# Patient Record
Sex: FEMALE | Race: WHITE | NOT HISPANIC OR LATINO | Employment: OTHER | URBAN - METROPOLITAN AREA
[De-identification: names, ages, dates, MRNs, and addresses within clinical notes are randomized per-mention and may not be internally consistent; named-entity substitution may affect disease eponyms.]

---

## 2017-04-25 ENCOUNTER — RECORDS - HEALTHEAST (OUTPATIENT)
Dept: LAB | Facility: CLINIC | Age: 62
End: 2017-04-25

## 2017-04-25 LAB
CREAT SERPL-MCNC: 0.63 MG/DL (ref 0.6–1.1)
GFR SERPL CREATININE-BSD FRML MDRD: >60 ML/MIN/1.73M2

## 2017-08-31 ENCOUNTER — PRE VISIT (OUTPATIENT)
Dept: NEUROLOGY | Facility: CLINIC | Age: 62
End: 2017-08-31

## 2017-08-31 NOTE — TELEPHONE ENCOUNTER
1.  Date/reason for appt: 11/7/17 1PM MS   2.  Referring provider:  Shaneka LIRA   3.  Call to patient (Yes / No - short description): no, pt was has recs at [facility] & [facility].   4.  Previous care at / records requested from:  Recs received from [facility] (Dr. Harden) - will forward to Clinic ( Images in PACS) Also scanned in Epic   Referral received from [facility] - will forward to clinic.   [facility] - Faxed cover shee to rechapito. recs

## 2017-08-31 NOTE — TELEPHONE ENCOUNTER
Received 1 disc from [facility] - will send to film room   Included: MR Head, MR T-Spine & MR C-Spine  10/3/14

## 2017-11-03 NOTE — TELEPHONE ENCOUNTER
Records Received From: [facility]     Date/Exam/Location  (specify location if different)   Office Notes: 8/2/17, 11/2/16, 12/4/15, 8/7/15, 4/21/14   Missing: Recs from [facility]   Recs at [facility]  Recs at [facility]

## 2017-11-03 NOTE — TELEPHONE ENCOUNTER
ACTION    What did you do? Faxed cover sheets to MATEUSZ, [facility] and [facility] to latasha recs

## 2018-01-18 NOTE — TELEPHONE ENCOUNTER
APPT INFO    Date /Time: 2/6/18 1:30PM   Reason for Appt: MS   Ref Provider/Clinic: Dr. Vera   Are there internal records? Yes/No?  IF YES, list clinic names: NO   Are there outside records? Yes/No? YES - see below   Patient Contact (Y/N) & Call Details: NO - records received   Action: Reviewed records     OUTSIDE RECORDS CHECKLIST     CLINIC NAME COMMENTS REC (x) IMG (x)   [facility]  Images in PACS:  MR head 10/3/14, 11/7/13  MR thoracic 10/3/14  MR cervical 10/3/14 x x   [facility]  - -   [facility] OFFICE NOTES: 5/2015- 11/2017  RADIOLOGY: CT head 2/24/16, 1/31/16, 1/1/16, 3/10/14, 3/4/14, 3/5/14  CT cervical 2/24/16  MR thoracic 3/12/14  ME cervical 3/12/14  MR angio neck 3/10/14, 3/6/14  MR angio head 3/10/14  MR brain 3/10/14, 3/5/14  MR angio head 3/6/14  ER/HOSP: 11/13/17, 9/27/17, 1/31/16  LABS: 2013, 2014  SLP notes: 10/30/17 x x     Records Received From: [facility]     Date/Exam/Location  (specify location if different)   Office Notes: 8/2/17, 11/2/16, 12/4/15, 8/7/15, 4/21/14

## 2018-01-19 NOTE — TELEPHONE ENCOUNTER
ACTION    What did you do? Per fax from [facility], patient hasn't been seen with them since 2013

## 2018-01-19 NOTE — TELEPHONE ENCOUNTER
Received Imaging From: [facility]    Image Type (x): Disc:___  Pacs:__x_      Exam Date/Name: MR thoracic 3/12/14  MR cervical 3/12/14  MRA head 3/10/14, 3/6/14 Comments: images are in PACS

## 2018-02-06 ENCOUNTER — PRE VISIT (OUTPATIENT)
Dept: NEUROLOGY | Facility: CLINIC | Age: 63
End: 2018-02-06

## 2018-03-15 ENCOUNTER — RECORDS - HEALTHEAST (OUTPATIENT)
Dept: LAB | Facility: CLINIC | Age: 63
End: 2018-03-15

## 2018-03-15 LAB
ALBUMIN UR-MCNC: NEGATIVE MG/DL
APPEARANCE UR: ABNORMAL
BACTERIA #/AREA URNS HPF: ABNORMAL HPF
BILIRUB UR QL STRIP: NEGATIVE
COLOR UR AUTO: ABNORMAL
GLUCOSE UR STRIP-MCNC: NEGATIVE MG/DL
HGB UR QL STRIP: NEGATIVE
KETONES UR STRIP-MCNC: NEGATIVE MG/DL
LEUKOCYTE ESTERASE UR QL STRIP: ABNORMAL
MUCOUS THREADS #/AREA URNS LPF: ABNORMAL LPF
NITRATE UR QL: NEGATIVE
PH UR STRIP: 6 [PH] (ref 4.5–8)
RBC #/AREA URNS AUTO: ABNORMAL HPF
SP GR UR STRIP: 1.01 (ref 1–1.03)
SQUAMOUS #/AREA URNS AUTO: ABNORMAL LPF
UROBILINOGEN UR STRIP-ACNC: ABNORMAL
WBC #/AREA URNS AUTO: ABNORMAL HPF

## 2018-03-16 ENCOUNTER — RECORDS - HEALTHEAST (OUTPATIENT)
Dept: LAB | Facility: CLINIC | Age: 63
End: 2018-03-16

## 2018-03-16 LAB
ALBUMIN SERPL-MCNC: 3.3 G/DL (ref 3.5–5)
ALP SERPL-CCNC: 104 U/L (ref 45–120)
ALT SERPL W P-5'-P-CCNC: 22 U/L (ref 0–45)
ANION GAP SERPL CALCULATED.3IONS-SCNC: 8 MMOL/L (ref 5–18)
AST SERPL W P-5'-P-CCNC: 16 U/L (ref 0–40)
BASOPHILS # BLD AUTO: 0.1 THOU/UL (ref 0–0.2)
BASOPHILS NFR BLD AUTO: 1 % (ref 0–2)
BILIRUB SERPL-MCNC: 0.2 MG/DL (ref 0–1)
BUN SERPL-MCNC: 14 MG/DL (ref 8–22)
C TRACH DNA SPEC QL PROBE+SIG AMP: NEGATIVE
CALCIUM SERPL-MCNC: 10.2 MG/DL (ref 8.5–10.5)
CHLORIDE BLD-SCNC: 105 MMOL/L (ref 98–107)
CO2 SERPL-SCNC: 23 MMOL/L (ref 22–31)
CREAT SERPL-MCNC: 0.68 MG/DL (ref 0.6–1.1)
EOSINOPHIL # BLD AUTO: 0.3 THOU/UL (ref 0–0.4)
EOSINOPHIL NFR BLD AUTO: 4 % (ref 0–6)
ERYTHROCYTE [DISTWIDTH] IN BLOOD BY AUTOMATED COUNT: 13.4 % (ref 11–14.5)
GFR SERPL CREATININE-BSD FRML MDRD: >60 ML/MIN/1.73M2
GLUCOSE BLD-MCNC: 109 MG/DL (ref 70–125)
HBV SURFACE AG SERPL QL IA: NEGATIVE
HCT VFR BLD AUTO: 36 % (ref 35–47)
HCV AB SERPL QL IA: NEGATIVE
HGB BLD-MCNC: 11.2 G/DL (ref 12–16)
HIV 1+2 AB+HIV1 P24 AG SERPL QL IA: NEGATIVE
LYMPHOCYTES # BLD AUTO: 3.3 THOU/UL (ref 0.8–4.4)
LYMPHOCYTES NFR BLD AUTO: 37 % (ref 20–40)
MCH RBC QN AUTO: 28.2 PG (ref 27–34)
MCHC RBC AUTO-ENTMCNC: 31.1 G/DL (ref 32–36)
MCV RBC AUTO: 91 FL (ref 80–100)
MONOCYTES # BLD AUTO: 0.8 THOU/UL (ref 0–0.9)
MONOCYTES NFR BLD AUTO: 9 % (ref 2–10)
N GONORRHOEA DNA SPEC QL NAA+PROBE: NEGATIVE
NEUTROPHILS # BLD AUTO: 4.3 THOU/UL (ref 2–7.7)
NEUTROPHILS NFR BLD AUTO: 50 % (ref 50–70)
PLATELET # BLD AUTO: 323 THOU/UL (ref 140–440)
PMV BLD AUTO: 9.9 FL (ref 8.5–12.5)
POTASSIUM BLD-SCNC: 4.1 MMOL/L (ref 3.5–5)
PROT SERPL-MCNC: 7.1 G/DL (ref 6–8)
RBC # BLD AUTO: 3.97 MILL/UL (ref 3.8–5.4)
SODIUM SERPL-SCNC: 136 MMOL/L (ref 136–145)
T PALLIDUM AB SER QL: NEGATIVE
TSH SERPL DL<=0.005 MIU/L-ACNC: 1.56 UIU/ML (ref 0.3–5)
VIT B12 SERPL-MCNC: 348 PG/ML (ref 213–816)
WBC: 8.8 THOU/UL (ref 4–11)

## 2018-03-17 LAB — BACTERIA SPEC CULT: ABNORMAL

## 2018-07-10 ENCOUNTER — RECORDS - HEALTHEAST (OUTPATIENT)
Dept: LAB | Facility: CLINIC | Age: 63
End: 2018-07-10

## 2018-07-11 LAB — HGB BLD-MCNC: 10.6 G/DL (ref 12–16)

## 2018-07-12 LAB — HBA1C MFR BLD: 5.4 % (ref 4.2–6.1)

## 2018-08-20 ENCOUNTER — OFFICE VISIT (OUTPATIENT)
Dept: OPHTHALMOLOGY | Facility: CLINIC | Age: 63
End: 2018-08-20
Payer: MEDICARE

## 2018-08-20 DIAGNOSIS — H40.003 GLAUCOMA SUSPECT OF BOTH EYES: ICD-10-CM

## 2018-08-20 DIAGNOSIS — H35.371 EPIRETINAL MEMBRANE, RIGHT EYE: ICD-10-CM

## 2018-08-20 DIAGNOSIS — H25.811 COMBINED FORMS OF AGE-RELATED CATARACT OF RIGHT EYE: ICD-10-CM

## 2018-08-20 DIAGNOSIS — H25.813 COMBINED FORM OF AGE-RELATED CATARACT, BOTH EYES: Primary | ICD-10-CM

## 2018-08-20 DIAGNOSIS — H52.4 PRESBYOPIA: ICD-10-CM

## 2018-08-20 DIAGNOSIS — G35 MULTIPLE SCLEROSIS (H): ICD-10-CM

## 2018-08-20 PROBLEM — H40.001 GLAUCOMA SUSPECT OF RIGHT EYE: Status: RESOLVED | Noted: 2018-08-20 | Resolved: 2018-08-20

## 2018-08-20 PROBLEM — H40.001 GLAUCOMA SUSPECT OF RIGHT EYE: Status: ACTIVE | Noted: 2018-08-20

## 2018-08-20 PROCEDURE — 92004 COMPRE OPH EXAM NEW PT 1/>: CPT | Performed by: STUDENT IN AN ORGANIZED HEALTH CARE EDUCATION/TRAINING PROGRAM

## 2018-08-20 PROCEDURE — 92015 DETERMINE REFRACTIVE STATE: CPT | Mod: GY | Performed by: STUDENT IN AN ORGANIZED HEALTH CARE EDUCATION/TRAINING PROGRAM

## 2018-08-20 RX ORDER — TRAMADOL HYDROCHLORIDE 50 MG/1
50 TABLET ORAL 3 TIMES DAILY PRN
Status: ON HOLD | COMMUNITY
End: 2021-03-11

## 2018-08-20 RX ORDER — IBUPROFEN 200 MG
200 TABLET ORAL 2 TIMES DAILY
COMMUNITY

## 2018-08-20 RX ORDER — CLOTRIMAZOLE 1 %
1 CREAM WITH APPLICATOR VAGINAL AT BEDTIME
COMMUNITY
End: 2021-03-10

## 2018-08-20 RX ORDER — POTASSIUM CHLORIDE 1.5 G/1.58G
20 POWDER, FOR SOLUTION ORAL DAILY
COMMUNITY

## 2018-08-20 RX ORDER — QUETIAPINE FUMARATE 50 MG/1
50 TABLET, FILM COATED ORAL 2 TIMES DAILY
COMMUNITY
End: 2018-08-20 | Stop reason: DRUGHIGH

## 2018-08-20 RX ORDER — LACTULOSE 10 G/15ML
20 SOLUTION ORAL 2 TIMES DAILY
COMMUNITY
End: 2021-03-10

## 2018-08-20 RX ORDER — BACLOFEN 20 MG/1
20 TABLET ORAL 3 TIMES DAILY
COMMUNITY

## 2018-08-20 ASSESSMENT — REFRACTION_WEARINGRX
OD_SPHERE: -4.50
OD_AXIS: 060
OD_CYLINDER: +1.25
OS_CYLINDER: +0.25
OS_SPHERE: -2.50
OS_AXIS: 076
OD_ADD: +2.50
SPECS_TYPE: BIFOCAL
OS_ADD: +2.50

## 2018-08-20 ASSESSMENT — REFRACTION_MANIFEST
OD_AXIS: 038
OD_ADD: +2.50
OS_ADD: +2.50
OS_CYLINDER: +1.25
OS_AXIS: 082
OD_CYLINDER: +1.50
OD_SPHERE: -6.75
OS_SPHERE: -2.50

## 2018-08-20 ASSESSMENT — VISUAL ACUITY
CORRECTION_TYPE: GLASSES
OD_CC: 20/200
METHOD: SNELLEN - LINEAR
OS_CC+: -1
OS_CC: 20/60
OD_PH_CC: 20/80
OS_PH_CC: 20/50

## 2018-08-20 ASSESSMENT — TONOMETRY
OS_IOP_MMHG: 13
IOP_METHOD: APPLANATION
OD_IOP_MMHG: 15

## 2018-08-20 ASSESSMENT — SLIT LAMP EXAM - LIDS
COMMENTS: NORMAL
COMMENTS: NORMAL

## 2018-08-20 ASSESSMENT — CUP TO DISC RATIO
OS_RATIO: 0.3
OD_RATIO: 0.65

## 2018-08-20 ASSESSMENT — EXTERNAL EXAM - RIGHT EYE: OD_EXAM: NORMAL

## 2018-08-20 ASSESSMENT — CONF VISUAL FIELD: OD_INFERIOR_TEMPORAL_RESTRICTION: 3

## 2018-08-20 ASSESSMENT — EXTERNAL EXAM - LEFT EYE: OS_EXAM: NORMAL

## 2018-08-20 NOTE — PROGRESS NOTES
" Current Eye Medications:  none     Subjective:  Cataract eval with decreased vision both eyes for last 2 months. No eye pain or discomfort in either eye.     No previous eye injuries or surgeries. Has multiple sclerosis and says she has had \"eye problems\" related to that. Last eye exam was a c[place] of years ago where she was told she had some cataracts.     Assessment:  Shelley Greenwood is a 63 year old female who presents with:     Combined form of age-related cataract, both eyes Visually significant right eye.  Significant anxiety and multiple sclerosis. Unable to be still during our exam, excessive head movement. Would recommend general anesthetic for eye surgery.   She will have her living facility call [facility] to discuss scheduling.     Epiretinal membrane, right eye      Glaucoma suspect of both eyes Needs baseline testing after surgery.     Multiple sclerosis (H)      Visually significant cataract that is interfering with daily activities of living. Plan for cataract extraction and intraocular lens implant right eye.  Risks, benefits, complications, and alternatives discussed with patient including possibility of limitations from coexistent eye disease and loss of vision. Target refraction and lens options discussed.  Patient understands and wishes to proceed with surgery.    Plan:  Recommend cataract surgery right eye (needs BAT for left eye)  Call [facility] to discuss dates for cataract surgery right eye.    Cintia Orosco MD  [phone]             "

## 2018-08-20 NOTE — MR AVS SNAPSHOT
After Visit Summary   8/20/2018    Shelley Greenwood    MRN: [id number]           Patient Information     Date Of Birth          1955        Visit Information        Provider Department      8/20/2018 1:45 PM Cintia Orosco MD [facility]        Today's Diagnoses     Combined form of age-related cataract, both eyes    -  1    Epiretinal membrane, right eye        Glaucoma suspect of both eyes        Multiple sclerosis (H)        Presbyopia          Care Instructions    Recommend cataract surgery right eye (needs BAT for left eye)  [facility] will call you with some dates for cataract surgery.    Cintia Orosco MD  [phone]            Follow-ups after your visit        Who to contact     If you have questions or need follow up information about today's clinic visit or your schedule please contact [facility] directly at [phone].  Normal or non-critical lab and imaging results will be communicated to you by MyChart, letter or phone within 4 business days after the clinic has received the results. If you do not hear from us within 7 days, please contact the clinic through MyChart or phone. If you have a critical or abnormal lab result, we will notify you by phone as soon as possible.  Submit refill requests through [employer] or call your pharmacy and they will forward the refill request to us. Please allow 3 business days for your refill to be completed.          Additional Information About Your Visit        Care EveryWhere ID     This is your Care EveryWhere ID. This could be used by other organizations to access your [employer] medical records  [id number]         Blood Pressure from Last 3 Encounters:   03/03/15 146/77   01/04/15 127/78   04/05/13 132/63    Weight from Last 3 Encounters:   03/02/15 81.6 kg (180 lb)   01/04/15 83 kg (183 lb)   04/05/13 95.3 kg (210 lb)              We Performed the Following     EYE EXAM (SIMPLE-NONBILLABLE)     REFRACTIVE  STATUS          Today's Medication Changes          These changes are accurate as of 8/20/18  3:05 PM.  If you have any questions, ask your nurse or doctor.               These medicines have changed or have updated prescriptions.        Dose/Directions    propranolol 20 MG tablet   Commonly known as:  INDERAL   This may have changed:  Another medication with the same name was removed. Continue taking this medication, and follow the directions you see here.   Changed by:  Cintia Orosco MD        Dose:  20 mg   Take 1 tablet (20 mg) by mouth 2 times daily   Quantity:  90 tablet   Refills:  0       SEROQUEL PO   This may have changed:  Another medication with the same name was removed. Continue taking this medication, and follow the directions you see here.   Changed by:  Cintia Orosco MD        Dose:  100 mg   Take 100 mg by mouth At Bedtime   Refills:  0               Information about OPIOIDS     PRESCRIPTION OPIOIDS: WHAT YOU NEED TO KNOW   We gave you an opioid (narcotic) pain medicine. It is important to manage your pain, but opioids are not always the best choice. You should first try all the other options your care team gave you. Take this medicine for as short a time (and as few doses) as possible.    Some activities can increase your pain, such as bandage changes or therapy sessions. It may help to take your pain medicine 30 to 60 minutes before these activities. Reduce your stress by getting enough sleep, working on hobbies you enjoy and practicing relaxation or meditation. Talk to your care team about ways to manage your pain beyond prescription opioids.    These medicines have risks:    DO NOT drive when on new or higher doses of pain medicine. These medicines can affect your alertness and reaction times, and you could be arrested for driving under the influence (DUI). If you need to use opioids long-term, talk to your care team about driving.    DO NOT operate heavy machinery    DO NOT do any  other dangerous activities while taking these medicines.    DO NOT drink any alcohol while taking these medicines.     If the opioid prescribed includes acetaminophen, DO NOT take with any other medicines that contain acetaminophen. Read all labels carefully. Look for the word  acetaminophen  or  Tylenol.  Ask your pharmacist if you have questions or are unsure.    You can get addicted to pain medicines, especially if you have a history of addiction (chemical, alcohol or substance dependence). Talk to your care team about ways to reduce this risk.    All opioids tend to cause constipation. Drink plenty of water and eat foods that have a lot of fiber, such as fruits, vegetables, prune juice, apple juice and high-fiber cereal. Take a laxative (Miralax, milk of magnesia, Colace, Senna) if you don t move your bowels at least every other day. Other side effects include upset stomach, sleepiness, dizziness, throwing up, tolerance (needing more of the medicine to have the same effect), physical dependence and slowed breathing.    Store your pills in a secure place, locked if possible. We will not replace any lost or stolen medicine. If you don t finish your medicine, please throw away (dispose) as directed by your pharmacist. The [place] Pollution Control Agency has more information about safe disposal: [email or web address]://[email or web address].[email or web address]-medications         Primary Care Provider Office Phone # Fax #    Genaro Pedroza [phone]       [address] [address]        Equal Access to Services     ARCADIO WAYNE : Hadii aad ku hadasho Soomaali, waaxda luqadaha, qaybta kaalmada adespencer bedoya. So [facility] [phone].    ATENCIÓN: Si habla español, tiene a carr disposición servicios gratuitos de asistencia lingüística. Llame al [phone].    We comply with applicable federal civil rights laws and [place] laws. We  do not discriminate on the basis of race, color, national origin, age, disability, sex, sexual orientation, or gender identity.            Thank you!     Thank you for choosing [facility]  for your care. Our goal is always to provide you with excellent care. Hearing back from our patients is one way we can continue to improve our services. Please take a few minutes to complete the written survey that you may receive in the mail after your visit with us. Thank you!             Your Updated Medication List - Protect others around you: Learn how to safely use, store and throw away your medicines at www.disposemymeds.org.          This list is accurate as of 8/20/18  3:05 PM.  Always use your most recent med list.                   Brand Name Dispense Instructions for use Diagnosis    acetaminophen 500 MG tablet    TYLENOL    160 tablet    Take 1-2 tablets (500-1,000 mg) by mouth every 6 hours as needed for mild pain    Muscle pain       alendronate 70 MG tablet    FOSAMAX    10 tablet    Take 1 tablet (70 mg) by mouth every 7 days    Osteoporosis, unspecified       AMANTADINE HCL PO      Take 50 mg by mouth 2 times daily        aspirin 325 MG tablet     30 tablet    Take 1 tablet (325 mg) by mouth daily    CAD (coronary artery disease)       * BACLOFEN PO      Take 20 mg by mouth        * baclofen 20 MG tablet    LIORESAL    120 tablet    Take 1 tablet (20 mg) by mouth 4 times daily    Muscle spasticity       cholecalciferol 2000 units Caps      Take 1 tablet by mouth daily        ciprofloxacin 500 MG tablet    CIPRO    12 tablet    Take 1 tablet (500 mg) by mouth every 12 hours    UTI (urinary tract infection)       CLONAZEPAM PO      Take 1 mg by mouth 3 times daily        clotrimazole 1 % cream    LOTRIMIN     Place 1 Applicatorful vaginally At Bedtime        divalproex sodium delayed-release 250 MG DR tablet    DEPAKOTE     Take 1,750 mg by mouth At Bedtime        docusate sodium 100 MG capsule     COLACE    60 capsule    Take 1 capsule (100 mg) by mouth 2 times daily    Constipation       EPINEPHrine 0.3 MG/0.3ML injection 2-pack    EPIPEN/ADRENACLICK/or ANY BX GENERIC EQUIV     Inject 0.3 mg into the muscle once as needed for anaphylaxis        * HYDROXYZINE HCL PO      Take 50 mg by mouth At Bedtime        * hydrOXYzine 25 MG tablet    ATARAX    120 tablet    Take 1 tablet (25 mg) by mouth 2 times daily as needed for itching or other (anxiety)        ibuprofen 600 MG tablet    ADVIL/MOTRIN     Take 600 mg by mouth        lactulose 10 GM/15ML solution    CHRONULAC     Take 20 g by mouth 2 times daily        lisinopril 10 MG tablet    PRINIVIL/ZESTRIL    30 tablet    Take 1 tablet (10 mg) by mouth daily    HTN (hypertension)       LOXITANE PO      Take 40 mg by mouth At Bedtime        MELATONIN PO      Take 6 mg by mouth At Bedtime        methyl salicylate-menthol Oint ointment     1 Tube    Apply 50 g topically every 6 hours as needed    Muscle pain       omeprazole 20 MG CR capsule    priLOSEC     Take 20 mg by mouth daily.        polyethylene glycol Packet    MIRALAX/GLYCOLAX    60 packet    Take 17 g by mouth 2 times daily    Constipation       potassium chloride 20 MEQ Packet    KLOR-CON     Take 20 mEq by mouth 2 times daily        propranolol 20 MG tablet    INDERAL    90 tablet    Take 1 tablet (20 mg) by mouth 2 times daily        SEROQUEL PO      Take 100 mg by mouth At Bedtime        topiramate 100 MG tablet    TOPAMAX    90 tablet    Take 1 tablet (100 mg) by mouth 3 times daily    Bipolar I disorder, most recent episode (or current) manic, unspecified       traMADol 50 MG tablet    ULTRAM     Take 50 mg by mouth every 6 hours as needed Not to exceed 100 mg in 24 hours        traZODone HCl 150 MG 24 hr tablet    OLEPTRO     Take 150 mg by mouth nightly as needed        * Notice:  This list has 4 medication(s) that are the same as other medications prescribed for you. Read the directions carefully,  and ask your doctor or other care provider to review them with you.

## 2018-08-20 NOTE — PATIENT INSTRUCTIONS
Recommend cataract surgery right eye (needs BAT for left eye)  [facility] will call you with some dates for cataract surgery.    Cintia Orosco MD  [phone]

## 2018-08-20 NOTE — LETTER
8/20/2018         RE: Shelley Greenwood  C/o Vani Greenwood  [address]  [place] [address]        Dear Colleague,    Thank you for referring your patient, Shelley Greenwood, to the [facility]. Please see a copy of my visit note below.     Current Eye Medications:  none     Subjective:  Cataract eval with decreased vision both eyes for last 2 months. No eye pain or discomfort in either eye.     Assessment:  Shelley Greenwood is a 63 year old female who presents with:     Combined form of age-related cataract, both eyes Visually significant      Epiretinal membrane, right eye      Glaucoma suspect of both eyes      Multiple sclerosis (H)      Visually significant cataract that is interfering with daily activities of living. Plan for cataract extraction and intraocular lens implant right eye.  Risks, benefits, complications, and alternatives discussed with patient including possibility of limitations from coexistent eye disease and loss of vision. Target refraction and lens options discussed.  Patient understands and wishes to proceed with surgery.    Plan:  Recommend cataract surgery right eye (needs BAT for left eye)  [facility] will call you with some dates for cataract surgery.    Cintia Orosco MD  [phone]               Again, thank you for allowing me to participate in the care of your patient.        Sincerely,        Cintia Orosco MD

## 2018-08-21 ENCOUNTER — HOSPITAL ENCOUNTER (OUTPATIENT)
Facility: CLINIC | Age: 63
End: 2018-08-21
Attending: STUDENT IN AN ORGANIZED HEALTH CARE EDUCATION/TRAINING PROGRAM | Admitting: STUDENT IN AN ORGANIZED HEALTH CARE EDUCATION/TRAINING PROGRAM
Payer: MEDICARE

## 2018-08-21 ENCOUNTER — TELEPHONE (OUTPATIENT)
Dept: OPHTHALMOLOGY | Facility: CLINIC | Age: 63
End: 2018-08-21

## 2018-08-21 NOTE — TELEPHONE ENCOUNTER
Type of surgery: Cataract Right Eye CPT 78518  Combined forms of age-related cataract of right eye [H25.811]   Location of surgery: [facility]  Date and time of surgery: 10/08/2018 @ 9:45am  Surgeon: Dr. Orosco  Pre-Op Appt Date: 09/24/2018  Post-Op Appt Date: 10/09/2018   Packet sent out: Yes  Pre-cert/Authorization completed:  No prior auth required.   Date: 08/21/2018

## 2018-10-05 ENCOUNTER — TELEPHONE (OUTPATIENT)
Dept: OPHTHALMOLOGY | Facility: CLINIC | Age: 63
End: 2018-10-05

## 2018-10-05 NOTE — TELEPHONE ENCOUNTER
Type of surgery: Cataract Extraction with Intraocular Lens Implant Right Eye CPT code 67450  Combined forms of age-related cataract of right eye [H25.811]  Location of surgery: [facility]  Date and time of surgery: 10/22/2018 @ 9:45am  Surgeon: Dr. Orocso  Pre-Op Appt Date: 10/10/2018  Post-Op Appt Date: 10/23/2018   Packet sent out: Yes  Pre-cert/Authorization completed:  No prior auth required.   Date: 10/05/2018

## 2018-10-10 ENCOUNTER — OFFICE VISIT (OUTPATIENT)
Dept: OPHTHALMOLOGY | Facility: CLINIC | Age: 63
End: 2018-10-10
Payer: MEDICARE

## 2018-10-10 DIAGNOSIS — H25.813 COMBINED FORM OF AGE-RELATED CATARACT, BOTH EYES: Primary | ICD-10-CM

## 2018-10-10 PROCEDURE — 92136 OPHTHALMIC BIOMETRY: CPT | Mod: TC | Performed by: STUDENT IN AN ORGANIZED HEALTH CARE EDUCATION/TRAINING PROGRAM

## 2018-10-10 PROCEDURE — 92012 INTRM OPH EXAM EST PATIENT: CPT | Performed by: STUDENT IN AN ORGANIZED HEALTH CARE EDUCATION/TRAINING PROGRAM

## 2018-10-10 RX ORDER — KETOROLAC TROMETHAMINE 5 MG/ML
1 SOLUTION OPHTHALMIC 4 TIMES DAILY
Qty: 1 BOTTLE | Refills: 0 | Status: SHIPPED | OUTPATIENT
Start: 2018-10-21 | End: 2018-11-30

## 2018-10-10 RX ORDER — MOXIFLOXACIN 5 MG/ML
1 SOLUTION/ DROPS OPHTHALMIC 4 TIMES DAILY
Qty: 1 BOTTLE | Refills: 0 | Status: SHIPPED | OUTPATIENT
Start: 2018-10-21 | End: 2018-11-30

## 2018-10-10 ASSESSMENT — REFRACTION_WEARINGRX
OD_ADD: +2.50
OD_SPHERE: -4.50
OS_AXIS: 076
OS_SPHERE: -2.50
OD_AXIS: 060
OS_ADD: +2.50
SPECS_TYPE: BIFOCAL
OD_CYLINDER: +1.25
OS_CYLINDER: +0.25

## 2018-10-10 ASSESSMENT — VISUAL ACUITY
OS_BAT_LOW: 20/80-1
OS_CC: 20/70
OS_BAT_HIGH: 20/125-1
OS_CC+: -2
OD_CC: 20/200
OS_BAT_MED: 20/100-1
METHOD: SNELLEN - LINEAR
CORRECTION_TYPE: GLASSES

## 2018-10-10 ASSESSMENT — EXTERNAL EXAM - LEFT EYE: OS_EXAM: NORMAL

## 2018-10-10 ASSESSMENT — SLIT LAMP EXAM - LIDS
COMMENTS: NORMAL
COMMENTS: NORMAL

## 2018-10-10 ASSESSMENT — EXTERNAL EXAM - RIGHT EYE: OD_EXAM: NORMAL

## 2018-10-10 NOTE — LETTER
10/10/2018         RE: Shelley Greenwood  [address]  [place] [address]        Dear Colleague,    Thank you for referring your patient, Shelley Greenwood, to the [facility]. Please see a copy of my visit note below.     Current Eye Medications: none     Subjective:  Here for preop right eye.  Scheduled for 10-22-18 at 9:45 am, patient is allergic to Prednisone per chart (shortness of breath).  Patient is claustrophobic and also cannot lay on her back. She has high anxiety and multiple sclerosis. No vision changes since last visit.     Objective:  See Ophthalmology Exam.       Assessment:  Shelley Greenwood is a 63 year old female who presents with:   Encounter Diagnosis   Name Primary?     Combined form of age-related cataract, both eyes Preop cataract right eye. Anticipate general anesthesia because of her high anxiety, inability to hold still, and phobia of laying flat. Lives in nursing home.       Plan:  PRE-OP CATARACT INSTRUCTIONS    **Prescriptions for 2 eyedrops given today. Please have them filled within the next 10 days.  *Use the following drops in the right eye 4 times on the day before surgery and once the morning of surgery:                                  Vigamox or Ofloxacin (tan cap)  Ketorolac (gray cap)    *Please bring all your eyedrops to the surgery center.  *If taking more than one drop, wait five minutes between drops.  *No solid food or drink after midnight. Please take the starred medications with a small sip of water the morning of surgery.    Cintia Orosco MD  [phone]      Again, thank you for allowing me to participate in the care of your patient.        Sincerely,        Cintia Orosco MD

## 2018-10-10 NOTE — PROGRESS NOTES
Current Eye Medications: none     Subjective:  Here for preop right eye.  Scheduled for 10-22-18 at 9:45 am, patient is allergic to Prednisone per chart (shortness of breath).  Patient is claustrophobic and also cannot lay on her back. She has high anxiety and multiple sclerosis. No vision changes since last visit.     Objective:  See Ophthalmology Exam.       Assessment:  Shelley Greenwood is a 63 year old female who presents with:   Encounter Diagnosis   Name Primary?     Combined form of age-related cataract, both eyes Preop cataract right eye. Anticipate general anesthesia because of her high anxiety, inability to hold still, and phobia of laying flat. Lives in nursing home.       Plan:  PRE-OP CATARACT INSTRUCTIONS    **Prescriptions for 2 eyedrops given today. Please have them filled within the next 10 days.  *Use the following drops in the right eye 4 times on the day before surgery and once the morning of surgery:                                  Vigamox or Ofloxacin (tan cap)  Ketorolac (gray cap)    *Please bring all your eyedrops to the surgery center.  *If taking more than one drop, wait five minutes between drops.  *No solid food or drink after midnight. Please take the starred medications with a small sip of water the morning of surgery.    Cintia Orosco MD  [phone]

## 2018-10-10 NOTE — PATIENT INSTRUCTIONS
PRE-OP CATARACT INSTRUCTIONS    **Prescriptions for 2 eyedrops given today. Please have them filled within the next 10 days.    *Use the following drops in the right eye 4 times on the day before surgery and once the morning of surgery:                                  Vigamox or Ofloxacin (tan cap)  Ketorolac (gray cap)    *Please bring all your eyedrops to the surgery center.    *If taking more than one drop, wait five minutes between drops.    *No solid food or drink after midnight. Please take the starred medications with a small sip of water the morning of surgery.    Cintia Orosco MD  [phone]

## 2018-10-10 NOTE — MR AVS SNAPSHOT
After Visit Summary   10/10/2018    Shelley Greenwood    MRN: [id number]           Patient Information     Date Of Birth          1955        Visit Information        Provider Department      10/10/2018 2:45 PM Cintia Orosco MD [facility] [place]        Today's Diagnoses     Combined form of age-related cataract, both eyes    -  1      Care Instructions    PRE-OP CATARACT INSTRUCTIONS    **Prescriptions for 2 eyedrops given today. Please have them filled within the next 10 days.    *Use the following drops in the right eye 4 times on the day before surgery and once the morning of surgery:                                  Vigamox or Ofloxacin (tan cap)  Ketorolac (gray cap)    *Please bring all your eyedrops to the surgery center.    *If taking more than one drop, wait five minutes between drops.    *No solid food or drink after midnight. Please take the starred medications with a small sip of water the morning of surgery.    Cintia Orosco MD  [phone]          Follow-ups after your visit        Follow-up notes from your care team     Return for PO1, as scheduled.      Your next 10 appointments already scheduled     Oct 22, 2018   Procedure with Cintia Orosco MD   [facility] (--)    [address]  [facility] [address]   [phone]            Oct 23, 2018 10:45 AM CDT   [facility] [place] [facility]    [address]  [place] [address]   [phone]            Oct 31, 2018  1:45 PM CDT   Return Visit with MD [facility] ([facility])    [address]  [place] [address]   [phone]            Nov 28, 2018 12:45 PM CST   [facility] [place] [facility]    [address]  [place] [address]   [phone]              Who to contact      If you have questions or need follow up information about today's clinic visit or your schedule please contact [facility] directly at [phone].  Normal or non-critical lab and imaging results will be communicated to you by MyChart, letter or phone within 4 business days after the clinic has received the results. If you do not hear from us within 7 days, please contact the clinic through MyChart or phone. If you have a critical or abnormal lab result, we will notify you by phone as soon as possible.  Submit refill requests through [employer] or call your pharmacy and they will forward the refill request to us. Please allow 3 business days for your refill to be completed.          Additional Information About Your Visit        Care EveryWhere ID     This is your Care EveryWhere ID. This could be used by other organizations to access your [place] medical records  [id number]         Blood Pressure from Last 3 Encounters:   03/03/15 146/77   01/04/15 127/78   04/05/13 132/63    Weight from Last 3 Encounters:   03/02/15 81.6 kg (180 lb)   01/04/15 83 kg (183 lb)   04/05/13 95.3 kg (210 lb)              Today, you had the following     No orders found for display       Primary Care Provider Office Phone # Fax #    Genaro Pedroza [phone]       [id number] [place] [address]        Equal Access to Services     [facility]: Hadii aad ku hadasho Soomaali, waaxda luqadaha, qaybta kaalmada adeegyada, spencer polo haytierney perez . So [facility] [phone].    ATENCIÓN: Si habla español, tiene a carr disposición servicios gratuitos de asistencia lingüística. Llame al [phone].    We comply with applicable federal civil rights laws and [place] laws. We do not discriminate on the basis of race, color, national origin, age, disability, sex, sexual orientation, or gender identity.            Thank you!     Thank you for choosing [facility]  [facility]  for your care. Our goal is always to provide you with excellent care. Hearing back from our patients is one way we can continue to improve our services. Please take a few minutes to complete the written survey that you may receive in the mail after your visit with us. Thank you!             Your Updated Medication List - Protect others around you: Learn how to safely use, store and throw away your medicines at www.disposemymeds.org.          This list is accurate as of 10/10/18  3:13 PM.  Always use your most recent med list.                   Brand Name Dispense Instructions for use Diagnosis    acetaminophen 500 MG tablet    TYLENOL    160 tablet    Take 1-2 tablets (500-1,000 mg) by mouth every 6 hours as needed for mild pain    Muscle pain       alendronate 70 MG tablet    FOSAMAX    10 tablet    Take 1 tablet (70 mg) by mouth every 7 days    Osteoporosis, unspecified       AMANTADINE HCL PO      Take 50 mg by mouth 2 times daily        aspirin 325 MG tablet     30 tablet    Take 1 tablet (325 mg) by mouth daily    CAD (coronary artery disease)       * BACLOFEN PO      Take 20 mg by mouth        * baclofen 20 MG tablet    LIORESAL    120 tablet    Take 1 tablet (20 mg) by mouth 4 times daily    Muscle spasticity       cholecalciferol 2000 units Caps      Take 1 tablet by mouth daily        ciprofloxacin 500 MG tablet    CIPRO    12 tablet    Take 1 tablet (500 mg) by mouth every 12 hours    UTI (urinary tract infection)       CLONAZEPAM PO      Take 1 mg by mouth 3 times daily        clotrimazole 1 % cream    LOTRIMIN     Place 1 Applicatorful vaginally At Bedtime        divalproex sodium delayed-release 250 MG DR tablet    DEPAKOTE     Take 1,750 mg by mouth At Bedtime        docusate sodium 100 MG capsule    COLACE    60 capsule    Take 1 capsule (100 mg) by mouth 2 times daily    Constipation       EPINEPHrine 0.3 MG/0.3ML injection 2-pack    EPIPEN/ADRENACLICK/or ANY BX GENERIC EQUIV     Inject 0.3  mg into the muscle once as needed for anaphylaxis        * HYDROXYZINE HCL PO      Take 50 mg by mouth At Bedtime        * hydrOXYzine 25 MG tablet    ATARAX    120 tablet    Take 1 tablet (25 mg) by mouth 2 times daily as needed for itching or other (anxiety)        ibuprofen 600 MG tablet    ADVIL/MOTRIN     Take 600 mg by mouth        lactulose 10 GM/15ML solution    CHRONULAC     Take 20 g by mouth 2 times daily        lisinopril 10 MG tablet    PRINIVIL/ZESTRIL    30 tablet    Take 1 tablet (10 mg) by mouth daily    HTN (hypertension)       LOXITANE PO      Take 40 mg by mouth At Bedtime        MELATONIN PO      Take 6 mg by mouth At Bedtime        methyl salicylate-menthol Oint ointment     1 Tube    Apply 50 g topically every 6 hours as needed    Muscle pain       omeprazole 20 MG CR capsule    priLOSEC     Take 20 mg by mouth daily.        polyethylene glycol Packet    MIRALAX/GLYCOLAX    60 packet    Take 17 g by mouth 2 times daily    Constipation       potassium chloride 20 MEQ Packet    KLOR-CON     Take 20 mEq by mouth 2 times daily        propranolol 20 MG tablet    INDERAL    90 tablet    Take 1 tablet (20 mg) by mouth 2 times daily        SEROQUEL PO      Take 100 mg by mouth At Bedtime        topiramate 100 MG tablet    TOPAMAX    90 tablet    Take 1 tablet (100 mg) by mouth 3 times daily    Bipolar I disorder, most recent episode (or current) manic, unspecified       traMADol 50 MG tablet    ULTRAM     Take 50 mg by mouth every 6 hours as needed Not to exceed 100 mg in 24 hours        traZODone HCl 150 MG 24 hr tablet    OLEPTRO     Take 150 mg by mouth nightly as needed        * Notice:  This list has 4 medication(s) that are the same as other medications prescribed for you. Read the directions carefully, and ask your doctor or other care provider to review them with you.

## 2018-10-12 ENCOUNTER — RECORDS - HEALTHEAST (OUTPATIENT)
Dept: LAB | Facility: CLINIC | Age: 63
End: 2018-10-12

## 2018-10-12 LAB
ANION GAP SERPL CALCULATED.3IONS-SCNC: 9 MMOL/L (ref 5–18)
BUN SERPL-MCNC: 15 MG/DL (ref 8–22)
CALCIUM SERPL-MCNC: 10.5 MG/DL (ref 8.5–10.5)
CHLORIDE BLD-SCNC: 106 MMOL/L (ref 98–107)
CO2 SERPL-SCNC: 22 MMOL/L (ref 22–31)
CREAT SERPL-MCNC: 0.65 MG/DL (ref 0.6–1.1)
GFR SERPL CREATININE-BSD FRML MDRD: >60 ML/MIN/1.73M2
GLUCOSE BLD-MCNC: 94 MG/DL (ref 70–125)
HGB BLD-MCNC: 11.1 G/DL (ref 12–16)
POTASSIUM BLD-SCNC: 4.1 MMOL/L (ref 3.5–5)
SODIUM SERPL-SCNC: 137 MMOL/L (ref 136–145)

## 2018-10-18 ENCOUNTER — TRANSFERRED RECORDS (OUTPATIENT)
Dept: HEALTH INFORMATION MANAGEMENT | Facility: CLINIC | Age: 63
End: 2018-10-18

## 2018-10-22 ENCOUNTER — SURGERY (OUTPATIENT)
Age: 63
End: 2018-10-22

## 2018-10-22 ENCOUNTER — ANESTHESIA (OUTPATIENT)
Dept: SURGERY | Facility: CLINIC | Age: 63
End: 2018-10-22
Payer: MEDICARE

## 2018-10-22 ENCOUNTER — ANESTHESIA EVENT (OUTPATIENT)
Dept: SURGERY | Facility: CLINIC | Age: 63
End: 2018-10-22
Payer: MEDICARE

## 2018-10-22 ENCOUNTER — HOSPITAL ENCOUNTER (OUTPATIENT)
Facility: CLINIC | Age: 63
Discharge: HOME | End: 2018-10-22
Attending: STUDENT IN AN ORGANIZED HEALTH CARE EDUCATION/TRAINING PROGRAM | Admitting: STUDENT IN AN ORGANIZED HEALTH CARE EDUCATION/TRAINING PROGRAM
Payer: MEDICARE

## 2018-10-22 VITALS
HEIGHT: 55 IN | TEMPERATURE: 97.8 F | DIASTOLIC BLOOD PRESSURE: 90 MMHG | SYSTOLIC BLOOD PRESSURE: 152 MMHG | RESPIRATION RATE: 11 BRPM | OXYGEN SATURATION: 96 % | BODY MASS INDEX: 55.54 KG/M2 | WEIGHT: 240 LBS

## 2018-10-22 PROCEDURE — 37000009 ZZH ANESTHESIA TECHNICAL FEE, EACH ADDTL 15 MIN: Performed by: STUDENT IN AN ORGANIZED HEALTH CARE EDUCATION/TRAINING PROGRAM

## 2018-10-22 PROCEDURE — 27210794 ZZH OR GENERAL SUPPLY STERILE: Performed by: STUDENT IN AN ORGANIZED HEALTH CARE EDUCATION/TRAINING PROGRAM

## 2018-10-22 PROCEDURE — 25000128 H RX IP 250 OP 636: Performed by: ANESTHESIOLOGY

## 2018-10-22 PROCEDURE — 71000006 ZZH RECOVERY EYE PHASE 1 LEVEL 2 FIRST HR: Performed by: STUDENT IN AN ORGANIZED HEALTH CARE EDUCATION/TRAINING PROGRAM

## 2018-10-22 PROCEDURE — 25000128 H RX IP 250 OP 636: Performed by: STUDENT IN AN ORGANIZED HEALTH CARE EDUCATION/TRAINING PROGRAM

## 2018-10-22 PROCEDURE — 25000125 ZZHC RX 250: Performed by: STUDENT IN AN ORGANIZED HEALTH CARE EDUCATION/TRAINING PROGRAM

## 2018-10-22 PROCEDURE — V2632 POST CHMBR INTRAOCULAR LENS: HCPCS | Performed by: STUDENT IN AN ORGANIZED HEALTH CARE EDUCATION/TRAINING PROGRAM

## 2018-10-22 PROCEDURE — 66984 XCAPSL CTRC RMVL W/O ECP: CPT | Mod: RT | Performed by: STUDENT IN AN ORGANIZED HEALTH CARE EDUCATION/TRAINING PROGRAM

## 2018-10-22 PROCEDURE — 37000008 ZZH ANESTHESIA TECHNICAL FEE, 1ST 30 MIN: Performed by: STUDENT IN AN ORGANIZED HEALTH CARE EDUCATION/TRAINING PROGRAM

## 2018-10-22 PROCEDURE — 71000028 ZZH EYE RECOVERY PHASE 2 EACH 15 MINS: Performed by: STUDENT IN AN ORGANIZED HEALTH CARE EDUCATION/TRAINING PROGRAM

## 2018-10-22 PROCEDURE — 25000128 H RX IP 250 OP 636: Performed by: NURSE ANESTHETIST, CERTIFIED REGISTERED

## 2018-10-22 PROCEDURE — 40000170 ZZH STATISTIC PRE-PROCEDURE ASSESSMENT II: Performed by: STUDENT IN AN ORGANIZED HEALTH CARE EDUCATION/TRAINING PROGRAM

## 2018-10-22 PROCEDURE — 25000125 ZZHC RX 250: Performed by: NURSE ANESTHETIST, CERTIFIED REGISTERED

## 2018-10-22 PROCEDURE — 36000101 ZZH EYE SURGERY LEVEL 3 1ST 30 MIN: Performed by: STUDENT IN AN ORGANIZED HEALTH CARE EDUCATION/TRAINING PROGRAM

## 2018-10-22 PROCEDURE — 36000102 ZZH EYE SURGERY LEVEL 3 EA 15 ADDTL MIN: Performed by: STUDENT IN AN ORGANIZED HEALTH CARE EDUCATION/TRAINING PROGRAM

## 2018-10-22 PROCEDURE — A9270 NON-COVERED ITEM OR SERVICE: HCPCS | Mod: GY | Performed by: ANESTHESIOLOGY

## 2018-10-22 PROCEDURE — 25000132 ZZH RX MED GY IP 250 OP 250 PS 637: Mod: GY | Performed by: ANESTHESIOLOGY

## 2018-10-22 PROCEDURE — 25000566 ZZH SEVOFLURANE, EA 15 MIN: Performed by: STUDENT IN AN ORGANIZED HEALTH CARE EDUCATION/TRAINING PROGRAM

## 2018-10-22 DEVICE — EYE IMP IOL AMO PCL TECNIS ZCB00 18.0: Type: IMPLANTABLE DEVICE | Site: EYE | Status: FUNCTIONAL

## 2018-10-22 RX ORDER — HYDROMORPHONE HYDROCHLORIDE 1 MG/ML
.3-.5 INJECTION, SOLUTION INTRAMUSCULAR; INTRAVENOUS; SUBCUTANEOUS EVERY 10 MIN PRN
Status: DISCONTINUED | OUTPATIENT
Start: 2018-10-22 | End: 2018-10-22 | Stop reason: HOSPADM

## 2018-10-22 RX ORDER — TETRACAINE HYDROCHLORIDE 5 MG/ML
SOLUTION OPHTHALMIC PRN
Status: DISCONTINUED | OUTPATIENT
Start: 2018-10-22 | End: 2018-10-22 | Stop reason: HOSPADM

## 2018-10-22 RX ORDER — ONDANSETRON 2 MG/ML
INJECTION INTRAMUSCULAR; INTRAVENOUS PRN
Status: DISCONTINUED | OUTPATIENT
Start: 2018-10-22 | End: 2018-10-22

## 2018-10-22 RX ORDER — OFLOXACIN 3 MG/ML
SOLUTION/ DROPS OPHTHALMIC PRN
Status: DISCONTINUED | OUTPATIENT
Start: 2018-10-22 | End: 2018-10-22 | Stop reason: HOSPADM

## 2018-10-22 RX ORDER — FENTANYL CITRATE 50 UG/ML
25-50 INJECTION, SOLUTION INTRAMUSCULAR; INTRAVENOUS
Status: DISCONTINUED | OUTPATIENT
Start: 2018-10-22 | End: 2018-10-22 | Stop reason: HOSPADM

## 2018-10-22 RX ORDER — CYCLOPENTOLATE HYDROCHLORIDE 10 MG/ML
1 SOLUTION/ DROPS OPHTHALMIC
Status: COMPLETED | OUTPATIENT
Start: 2018-10-22 | End: 2018-10-22

## 2018-10-22 RX ORDER — ONDANSETRON 4 MG/1
4 TABLET, ORALLY DISINTEGRATING ORAL EVERY 30 MIN PRN
Status: DISCONTINUED | OUTPATIENT
Start: 2018-10-22 | End: 2018-10-22 | Stop reason: HOSPADM

## 2018-10-22 RX ORDER — SODIUM CHLORIDE, SODIUM LACTATE, POTASSIUM CHLORIDE, CALCIUM CHLORIDE 600; 310; 30; 20 MG/100ML; MG/100ML; MG/100ML; MG/100ML
INJECTION, SOLUTION INTRAVENOUS CONTINUOUS
Status: DISCONTINUED | OUTPATIENT
Start: 2018-10-22 | End: 2018-10-22 | Stop reason: HOSPADM

## 2018-10-22 RX ORDER — LIDOCAINE HYDROCHLORIDE 10 MG/ML
INJECTION, SOLUTION EPIDURAL; INFILTRATION; INTRACAUDAL; PERINEURAL PRN
Status: DISCONTINUED | OUTPATIENT
Start: 2018-10-22 | End: 2018-10-22 | Stop reason: HOSPADM

## 2018-10-22 RX ORDER — PROPARACAINE HYDROCHLORIDE 5 MG/ML
1 SOLUTION/ DROPS OPHTHALMIC ONCE
Status: DISCONTINUED | OUTPATIENT
Start: 2018-10-22 | End: 2018-10-22 | Stop reason: HOSPADM

## 2018-10-22 RX ORDER — TROPICAMIDE 10 MG/ML
1 SOLUTION/ DROPS OPHTHALMIC
Status: COMPLETED | OUTPATIENT
Start: 2018-10-22 | End: 2018-10-22

## 2018-10-22 RX ORDER — EPHEDRINE SULFATE 50 MG/ML
INJECTION, SOLUTION INTRAMUSCULAR; INTRAVENOUS; SUBCUTANEOUS PRN
Status: DISCONTINUED | OUTPATIENT
Start: 2018-10-22 | End: 2018-10-22

## 2018-10-22 RX ORDER — NALOXONE HYDROCHLORIDE 0.4 MG/ML
.1-.4 INJECTION, SOLUTION INTRAMUSCULAR; INTRAVENOUS; SUBCUTANEOUS
Status: DISCONTINUED | OUTPATIENT
Start: 2018-10-22 | End: 2018-10-22 | Stop reason: HOSPADM

## 2018-10-22 RX ORDER — LIDOCAINE 40 MG/G
CREAM TOPICAL
Status: DISCONTINUED | OUTPATIENT
Start: 2018-10-22 | End: 2018-10-22 | Stop reason: HOSPADM

## 2018-10-22 RX ORDER — FENTANYL CITRATE 50 UG/ML
INJECTION, SOLUTION INTRAMUSCULAR; INTRAVENOUS PRN
Status: DISCONTINUED | OUTPATIENT
Start: 2018-10-22 | End: 2018-10-22

## 2018-10-22 RX ORDER — PHENYLEPHRINE HYDROCHLORIDE 25 MG/ML
1 SOLUTION/ DROPS OPHTHALMIC
Status: COMPLETED | OUTPATIENT
Start: 2018-10-22 | End: 2018-10-22

## 2018-10-22 RX ORDER — LIDOCAINE HYDROCHLORIDE 20 MG/ML
INJECTION, SOLUTION INFILTRATION; PERINEURAL PRN
Status: DISCONTINUED | OUTPATIENT
Start: 2018-10-22 | End: 2018-10-22

## 2018-10-22 RX ORDER — PROPOFOL 10 MG/ML
INJECTION, EMULSION INTRAVENOUS PRN
Status: DISCONTINUED | OUTPATIENT
Start: 2018-10-22 | End: 2018-10-22

## 2018-10-22 RX ORDER — PROPARACAINE HYDROCHLORIDE 5 MG/ML
1 SOLUTION/ DROPS OPHTHALMIC ONCE
Status: COMPLETED | OUTPATIENT
Start: 2018-10-22 | End: 2018-10-22

## 2018-10-22 RX ORDER — BALANCED SALT SOLUTION 6.4; .75; .48; .3; 3.9; 1.7 MG/ML; MG/ML; MG/ML; MG/ML; MG/ML; MG/ML
SOLUTION OPHTHALMIC PRN
Status: DISCONTINUED | OUTPATIENT
Start: 2018-10-22 | End: 2018-10-22 | Stop reason: HOSPADM

## 2018-10-22 RX ORDER — CLONAZEPAM 0.5 MG/1
0.5 TABLET ORAL ONCE
Status: COMPLETED | OUTPATIENT
Start: 2018-10-22 | End: 2018-10-22

## 2018-10-22 RX ORDER — ONDANSETRON 2 MG/ML
4 INJECTION INTRAMUSCULAR; INTRAVENOUS EVERY 30 MIN PRN
Status: DISCONTINUED | OUTPATIENT
Start: 2018-10-22 | End: 2018-10-22 | Stop reason: HOSPADM

## 2018-10-22 RX ORDER — MEPERIDINE HYDROCHLORIDE 25 MG/ML
12.5 INJECTION INTRAMUSCULAR; INTRAVENOUS; SUBCUTANEOUS
Status: DISCONTINUED | OUTPATIENT
Start: 2018-10-22 | End: 2018-10-22 | Stop reason: HOSPADM

## 2018-10-22 RX ADMIN — EPINEPHRINE 1 ML: 1 INJECTION INTRAMUSCULAR; INTRAVENOUS; SUBCUTANEOUS at 10:26

## 2018-10-22 RX ADMIN — CYCLOPENTOLATE HYDROCHLORIDE 1 DROP: 10 SOLUTION/ DROPS OPHTHALMIC at 09:04

## 2018-10-22 RX ADMIN — Medication 10 MG: at 10:26

## 2018-10-22 RX ADMIN — OFLOXACIN 1 DROP: 3 SOLUTION/ DROPS OPHTHALMIC at 10:33

## 2018-10-22 RX ADMIN — TROPICAMIDE 1 DROP: 10 SOLUTION/ DROPS OPHTHALMIC at 08:51

## 2018-10-22 RX ADMIN — MIDAZOLAM 1 MG: 1 INJECTION INTRAMUSCULAR; INTRAVENOUS at 10:05

## 2018-10-22 RX ADMIN — CYCLOPENTOLATE HYDROCHLORIDE 1 DROP: 10 SOLUTION/ DROPS OPHTHALMIC at 08:51

## 2018-10-22 RX ADMIN — TROPICAMIDE 1 DROP: 10 SOLUTION/ DROPS OPHTHALMIC at 09:04

## 2018-10-22 RX ADMIN — ONDANSETRON 4 MG: 2 INJECTION INTRAMUSCULAR; INTRAVENOUS at 10:15

## 2018-10-22 RX ADMIN — Medication 5 MG: at 10:23

## 2018-10-22 RX ADMIN — SODIUM CHONDROITIN SULFATE / SODIUM HYALURONATE 1 ML: 0.55-0.5 INJECTION INTRAOCULAR at 10:33

## 2018-10-22 RX ADMIN — CLONAZEPAM 0.5 MG: 0.5 TABLET ORAL at 11:32

## 2018-10-22 RX ADMIN — PROPARACAINE HYDROCHLORIDE 1 DROP: 5 SOLUTION/ DROPS OPHTHALMIC at 08:51

## 2018-10-22 RX ADMIN — TROPICAMIDE 1 DROP: 10 SOLUTION/ DROPS OPHTHALMIC at 09:00

## 2018-10-22 RX ADMIN — PHENYLEPHRINE HYDROCHLORIDE 1 DROP: 2.5 SOLUTION/ DROPS OPHTHALMIC at 08:51

## 2018-10-22 RX ADMIN — PHENYLEPHRINE HYDROCHLORIDE 1 DROP: 2.5 SOLUTION/ DROPS OPHTHALMIC at 09:04

## 2018-10-22 RX ADMIN — CYCLOPENTOLATE HYDROCHLORIDE 1 DROP: 10 SOLUTION/ DROPS OPHTHALMIC at 09:00

## 2018-10-22 RX ADMIN — Medication 10 MG: at 10:32

## 2018-10-22 RX ADMIN — PROPOFOL 200 MG: 10 INJECTION, EMULSION INTRAVENOUS at 10:09

## 2018-10-22 RX ADMIN — MIDAZOLAM 1 MG: 1 INJECTION INTRAMUSCULAR; INTRAVENOUS at 10:07

## 2018-10-22 RX ADMIN — TETRACAINE HYDROCHLORIDE 2 DROP: 5 SOLUTION OPHTHALMIC at 10:27

## 2018-10-22 RX ADMIN — LIDOCAINE HYDROCHLORIDE 1 ML: 10 INJECTION, SOLUTION EPIDURAL; INFILTRATION; INTRACAUDAL; PERINEURAL at 10:26

## 2018-10-22 RX ADMIN — PHENYLEPHRINE HYDROCHLORIDE 1 DROP: 2.5 SOLUTION/ DROPS OPHTHALMIC at 09:00

## 2018-10-22 RX ADMIN — BALANCED SALT SOLUTION 15 ML: 6.4; .75; .48; .3; 3.9; 1.7 SOLUTION OPHTHALMIC at 10:26

## 2018-10-22 RX ADMIN — SODIUM CHLORIDE, POTASSIUM CHLORIDE, SODIUM LACTATE AND CALCIUM CHLORIDE: 600; 310; 30; 20 INJECTION, SOLUTION INTRAVENOUS at 09:01

## 2018-10-22 RX ADMIN — LIDOCAINE HYDROCHLORIDE 100 MG: 20 INJECTION, SOLUTION INFILTRATION; PERINEURAL at 10:09

## 2018-10-22 RX ADMIN — DEXMEDETOMIDINE HYDROCHLORIDE 8 MCG: 100 INJECTION, SOLUTION INTRAVENOUS at 10:07

## 2018-10-22 RX ADMIN — FENTANYL CITRATE 25 MCG: 50 INJECTION, SOLUTION INTRAMUSCULAR; INTRAVENOUS at 10:07

## 2018-10-22 RX ADMIN — EPINEPHRINE 500 ML: 1 INJECTION, SOLUTION, CONCENTRATE INTRAVENOUS at 10:26

## 2018-10-22 NOTE — DISCHARGE INSTRUCTIONS
CATARACT SURGERY POST-OP INSTRUCTIONS  Dr. Cintia Orosco  [phone]        Pt had Clonazepam 0.5 mg today (10/22) at 1132 AM        Start using all three eye drops today, including   - Vigamox (tan top)   - Ketolorac (grey top)   - Prednisolone (white or pink top)    You should get 3 doses in today and 4 doses daily starting tomorrow.  Wait a few minutes in between putting each drop in.      Keep the eye shield taped in place unless putting drops in. We will remove it for you in the office tomorrow.      Light sensitivity may be noticed. Sunglasses may be worn for comfort.      Do not rub the operated eye.      Keep the operated eye dry. You may wash your hair, bathe or shower, but keep the operated eye closed while doing so.       No swimming, hot tub, or sauna for 2 weeks.      No make up around eye for 5 days.      No bending at the waist or lifting more than 10 pounds for one week.      May take Tylenol (per directions on bottle) for mild pain.      Call the office at [phone] and ask to speak to the on-call ophthalmologist  if any of the following should occur:  o Any sudden vision changes  o Nausea or severe headache  o Increase in pain not controlled  o Or signs of infection (pus, increasing redness or tenderness)      Same Day Surgery Discharge Instructions for  Sedation and General Anesthesia       It's not unusual to feel dizzy, light-headed or faint for up to 24 hours after surgery or while taking pain medication.  If you have these symptoms: sit for a few minutes before standing and have someone assist you when you get up to walk or use the bathroom.      You should rest and relax for the next 24 hours. We recommend you make arrangements to have an adult stay with you for at least 24 hours after your discharge.  Avoid hazardous and strenuous activity.      DO NOT DRIVE any vehicle or operate mechanical equipment for 24 hours following the end of your surgery.  Even though you may feel normal,  your reactions may be affected by the medication you have received.      Do not drink alcoholic beverages for 24 hours following surgery.       Slowly progress to your regular diet as you feel able. It's not unusual to feel nauseated and/or vomit after receiving anesthesia.  If you develop these symptoms, drink clear liquids (apple juice, ginger ale, broth, 7-up, etc. ) until you feel better.  If your nausea and vomiting persists for 24 hours, please notify your surgeon.        All narcotic pain medications, along with inactivity and anesthesia, can cause constipation. Drinking plenty of liquids and increasing fiber intake will help.      For any questions of a medical nature, call your surgeon.      Do not make important decisions for 24 hours.      If you had general anesthesia, you may have a sore throat for a couple of days related to the breathing tube used during surgery.  You may use Cepacol lozenges to help with this discomfort.  If it worsens or if you develop a fever, contact your surgeon.       If you feel your pain is not well managed with the pain medications prescribed by your surgeon, please contact your surgeon's office to let them know so they can address your concerns.

## 2018-10-22 NOTE — OR NURSING
Patient report given to Josefina ALVAREZ in [facility]. Patient much calmer now. Took an oral anti anxiety med before transfer over the [facility]. Eye glasses with patient. Transferred via cart, room air, in stable condition.

## 2018-10-22 NOTE — ANESTHESIA CARE TRANSFER NOTE
Patient: Shelley Greenwood    Procedure(s):  RIGHT EYE PHACOEMULSIFICATION CLEAR CORNEA WITH STANDARD INTRAOCULAR LENS IMPLANT  (MACTOP)    Diagnosis: CATARACT RIGHT EYE  Diagnosis Additional Information: No value filed.    Anesthesia Type:   General, LMA     Note:  Airway :Room Air  Patient transferred to:PACU  Handoff Report: Identifed the Patient, Identified the Reponsible Provider, Reviewed the pertinent medical history, Discussed the surgical course, Reviewed Intra-OP anesthesia mangement and issues during anesthesia, Set expectations for post-procedure period and Allowed opportunity for questions and acknowledgement of understanding      Vitals: (Last set prior to Anesthesia Care Transfer)    CRNA VITALS  10/22/2018 1015 - 10/22/2018 1053      10/22/2018             Pulse: 75    SpO2: 97 %    Resp Rate (set): 10                Electronically Signed By: MAK Billy CRNA  October 22, 2018  10:53 AM

## 2018-10-22 NOTE — ANESTHESIA POSTPROCEDURE EVALUATION
Patient: Shelley Greenwood    Procedure(s):  RIGHT EYE PHACOEMULSIFICATION CLEAR CORNEA WITH STANDARD INTRAOCULAR LENS IMPLANT  (MACTOP)    Diagnosis:CATARACT RIGHT EYE  Diagnosis Additional Information: No value filed.    Anesthesia Type:  General, LMA    Note:  Anesthesia Post Evaluation    Patient location during evaluation: PACU  Patient participation: Able to fully participate in evaluation  Level of consciousness: awake and alert  Pain management: satisfactory to patient  Airway patency: patent  Cardiovascular status: acceptable and hemodynamically stable  Respiratory status: acceptable and unassisted  Hydration status: acceptable  PONV: none       Comments: Patient given benzo in recovery for agitation/anxiety        Last vitals:  Vitals:    10/22/18 1054 10/22/18 1100 10/22/18 1115   BP: 114/80 126/77 152/90   Resp: 16 14 11   Temp: 36.6  C (97.8  F)     SpO2: 96% 96% 96%         Electronically Signed By: Raphael Escobedo DO  October 22, 2018  1:56 PM

## 2018-10-22 NOTE — OR NURSING
Per [employer] they have medical transport to take pt back to her nursing home.  Ride arranged for pt to return to nursing home via [employer] [facility] medical transport.     discharge pt to [facility] and [facility].  discharge instructions sent with pt and report called to Amanda RN at nursing home.  No questions or concerned.  Pt denies pain or discomfort.  Iv discharge, paperwork sent with .

## 2018-10-22 NOTE — OP NOTE
PreOp Diagnosis: Visually significant nuclear sclerotic cataract right eye  PostOp Diagnosis: Same  Surgeon: Cintia Orosco MD  Implant: Technis [id number]   Procedures:   1. Review of intraocular lens calculations, both eyes   2. Phacoemulsification and extraction of lens  right eye   3. Intraocular lens implantation right eye  Anesthesia: MAC/topical  Complications: None  EBL: <1cc    Shelley Greenwood suffers from a visually significant cataract of the right eye. This has caused problems with distance and reading vision, including glare. After discussing the risks, benefits, and alternatives, the patient wishes to proceed with cataract surgery.    The patient was identified in the pre-op area where the right eye was marked. The patient was then brought to the operating room where a time out was called, identifying the patient, the procedure, and the correct site. Tetracaine drops were applied to both eyes. The operative eye was then prepped and draped in the usual sterile ophthalmic fashion. An eyelid speculum was placed into the operative eye. Additional tetracaine drops were applied. A paracentesis was made superior with a side port blade. 1% preservative-free lidocaine and epinephrine was injected into the anterior chamber.  Viscoat was injected to deepen the anterior chamber. A 2.5mm clear corneal wound was created with a keratome blade temporally.  A continuous curvilinear capsulorrhexis was started with a bent cystitome and completed with Utrada forceps. Hydrodissection of the lens nucleus was performed with BSS on a cannula. The lens nucleus was rotated. Phacoemulsification of the lens nucleus was accomplished in a phacoemulsification stop and chop technique. Remaining cortex was removed with irrigation and aspiration. The lens capsule was noted to be intact. Provisc was used to inflate the capsular bag.  The lens was injected into the capsular bag. Remaining viscoelastic was removed with irrigation and  aspiration. The wounds were checked and found to be watertight after hydration. The eyelid speculum was removed and ofloxacin  drops were placed into the operative eye. The patient tolerated the procedure well and was in stable condition on the way to the recovery area.     Cintia Orosco MD

## 2018-10-22 NOTE — ANESTHESIA PREPROCEDURE EVALUATION
Anesthesia Evaluation     . Pt has had prior anesthetic.     No history of anesthetic complications          ROS/MED HX    ENT/Pulmonary:       Neurologic:     (+)Multiple Sclerosis     Cardiovascular:         METS/Exercise Tolerance:     Hematologic:         Musculoskeletal:         GI/Hepatic:         Renal/Genitourinary:         Endo:     (+) Obesity, .      Psychiatric:     (+) psychiatric history bipolar, anxiety and other (comment) (anxiety, psychosis/delusions per psych note, organic brain syndrome)      Infectious Disease:         Malignancy:         Other:                     Physical Exam  Normal systems: cardiovascular and pulmonary    Airway   Mallampati: II  TM distance: >3 FB  Neck ROM: full    Dental   (+) missing    Cardiovascular       Pulmonary                     Anesthesia Plan      History & Physical Review  History and physical reviewed and following examination; no interval change.    ASA Status:  3 .    NPO Status:  > 8 hours    Plan for General and LMA with Intravenous and Propofol induction. Maintenance will be Balanced.    PONV prophylaxis:  Ondansetron (or other 5HT-3)       Postoperative Care  Postoperative pain management:  IV analgesics.      Consents  Anesthetic plan, risks, benefits and alternatives discussed with:  Patient..                          .

## 2018-10-22 NOTE — IP AVS SNAPSHOT
[facility]    [address]    [place] [address]    Phone:  [phone]    Fax:  [phone]                                       After Visit Summary   10/22/2018    Shelley Greenwood    MRN: [id number]           After Visit Summary Signature Page     I have received my discharge instructions, and my questions have been answered. I have discussed any challenges I see with this plan with the nurse or doctor.    ..........................................................................................................................................  Patient/Patient Representative Signature      ..........................................................................................................................................  Patient Representative Print Name and Relationship to Patient    ..................................................               ................................................  Date                                   Time    ..........................................................................................................................................  Reviewed by Signature/Title    ...................................................              ..............................................  Date                                               Time          22EPIC Rev 08/18

## 2018-10-22 NOTE — IP AVS SNAPSHOT
MRN:[id number]                      After Visit Summary   10/22/2018    Shelley Greenwood    MRN: [id number]           Thank you!     Thank you for choosing [facility] for your care. Our goal is always to provide you with excellent care. Hearing back from our patients is one way we can continue to improve our services. Please take a few minutes to complete the written survey that you may receive in the mail after you visit with us. Thank you!        Patient Information     Date Of Birth          1955        Designated Caregiver       Most Recent Value    Caregiver    Will someone help with your care after discharge? yes      About your hospital stay     You were admitted on:  October 22, 2018 You last received care in the:  [facility]    You were discharged on:  October 22, 2018       Who to Call     For medical emergencies, please call 911.  For non-urgent questions about your medical care, please call your primary care provider or clinic, [phone]  For questions related to your surgery, please call your surgery clinic        Attending Provider     Provider Specialty    Cintia Orosco MD Ophthalmology       Primary Care Provider Office Phone # Fax #    Genaro Pedroza [phone]      Your next 10 appointments already scheduled     Oct 23, 2018 10:45 AM CDT   Return Visit with Cintia Orosco MD   [facility] ([facility])    [address] [place] [place] [address] [phone]            Oct 31, 2018  1:45 PM CDT   Return Visit with Cintia Orosco MD   [facility] [place] ([facility])    [address] [place] [address] [phone]            Nov 28, 2018 12:45 PM CST   Return Visit with Cintia Orosco MD   [facility] [place] ([facility])    [address]  [place] [address] [phone]              Further instructions from your care team        CATARACT SURGERY POST-OP INSTRUCTIONS  Dr. Cintia Orosco  [phone]        Pt had Clonazepam 0.5 mg today (10/22) at 1132 AM        Start using all three eye drops today, including   - Vigamox (tan top)   - Ketolorac (grey top)   - Prednisolone (white or pink top)    You should get 3 doses in today and 4 doses daily starting tomorrow.  Wait a few minutes in between putting each drop in.      Keep the eye shield taped in place unless putting drops in. We will remove it for you in the office tomorrow.      Light sensitivity may be noticed. Sunglasses may be worn for comfort.      Do not rub the operated eye.      Keep the operated eye dry. You may wash your hair, bathe or shower, but keep the operated eye closed while doing so.       No swimming, hot tub, or sauna for 2 weeks.      No make up around eye for 5 days.      No bending at the waist or lifting more than 10 pounds for one week.      May take Tylenol (per directions on bottle) for mild pain.      Call the office at [phone] and ask to speak to the on-call ophthalmologist  if any of the following should occur:  o Any sudden vision changes  o Nausea or severe headache  o Increase in pain not controlled  o Or signs of infection (pus, increasing redness or tenderness)      Same Day Surgery Discharge Instructions for  Sedation and General Anesthesia       It's not unusual to feel dizzy, light-headed or faint for up to 24 hours after surgery or while taking pain medication.  If you have these symptoms: sit for a few minutes before standing and have someone assist you when you get up to walk or use the bathroom.      You should rest and relax for the next 24 hours. We recommend you make arrangements to have an adult stay with you for at least 24 hours after your discharge.  Avoid hazardous and strenuous activity.      DO NOT DRIVE any vehicle or operate mechanical equipment for 24 hours following the end of your surgery.  Even though you may feel  "normal, your reactions may be affected by the medication you have received.      Do not drink alcoholic beverages for 24 hours following surgery.       Slowly progress to your regular diet as you feel able. It's not unusual to feel nauseated and/or vomit after receiving anesthesia.  If you develop these symptoms, drink clear liquids (apple juice, ginger ale, broth, 7-up, etc. ) until you feel better.  If your nausea and vomiting persists for 24 hours, please notify your surgeon.        All narcotic pain medications, along with inactivity and anesthesia, can cause constipation. Drinking plenty of liquids and increasing fiber intake will help.      For any questions of a medical nature, call your surgeon.      Do not make important decisions for 24 hours.      If you had general anesthesia, you may have a sore throat for a couple of days related to the breathing tube used during surgery.  You may use Cepacol lozenges to help with this discomfort.  If it worsens or if you develop a fever, contact your surgeon.       If you feel your pain is not well managed with the pain medications prescribed by your surgeon, please contact your surgeon's office to let them know so they can address your concerns.           Pending Results     No orders found from 10/20/2018 to 10/23/2018.            Admission Information     Date & Time Provider Department Dept. Phone    10/22/2018 Cintia Orosco MD [facility] [phone]      Your Vitals Were     Blood Pressure Temperature Respirations Height Weight Pulse Oximetry    152/90 97.8  F (36.6  C) (Temporal) 11 0.56 m (1' 10.05\") 108.9 kg (240 lb) 96%    BMI (Body Mass Index)                   347.15 kg/m2           Care EveryWhere ID     This is your Care EveryWhere ID. This could be used by other organizations to access your [facility] medical records  [id number]        Equal Access to Services     ARCADIO WAYNE AH: david Rodriguez, " pepe shwetaalexus rajeshelke spencer figueroaowen alexanderaan ah. So [facility] [phone].    ATENCIÓN: Si radha saldana, tiene a carr disposición servicios gratuitos de asistencia lingüística. Enrique al [phone].    We comply with applicable federal civil rights laws and [place] laws. We do not discriminate on the basis of race, color, national origin, age, disability, sex, sexual orientation, or gender identity.               Review of your medicines      CONTINUE these medicines which have NOT CHANGED        Dose / Directions    acetaminophen 500 MG tablet   Commonly known as:  TYLENOL   Used for:  Muscle pain        Dose:  500-1000 mg   Take 1-2 tablets (500-1,000 mg) by mouth every 6 hours as needed for mild pain   Quantity:  160 tablet   Refills:  0       alendronate 70 MG tablet   Commonly known as:  FOSAMAX   Used for:  Osteoporosis, unspecified        Dose:  70 mg   Take 1 tablet (70 mg) by mouth every 7 days   Quantity:  10 tablet   Refills:  0       AMANTADINE HCL PO        Dose:  50 mg   Take 50 mg by mouth 2 times daily   Refills:  0       aspirin 325 MG tablet   Used for:  CAD (coronary artery disease)        Dose:  325 mg   Take 1 tablet (325 mg) by mouth daily   Quantity:  30 tablet   Refills:  0       * BACLOFEN PO        Dose:  20 mg   Take 20 mg by mouth   Refills:  0       * baclofen 20 MG tablet   Commonly known as:  LIORESAL   Used for:  Muscle spasticity        Dose:  20 mg   Take 1 tablet (20 mg) by mouth 4 times daily   Quantity:  120 tablet   Refills:  0       cholecalciferol 2000 units Caps        Dose:  1 tablet   Take 1 tablet by mouth daily   Refills:  0       ciprofloxacin 500 MG tablet   Commonly known as:  CIPRO   Indication:  Urinary Tract Infection   Used for:  UTI (urinary tract infection)        Dose:  500 mg   Take 1 tablet (500 mg) by mouth every 12 hours   Quantity:  12 tablet   Refills:  0       CLONAZEPAM PO        Dose:  1 mg   Take 1 mg by mouth 3 times daily    Refills:  0       clotrimazole 1 % cream   Commonly known as:  LOTRIMIN        Dose:  1 Applicatorful   Place 1 Applicatorful vaginally At Bedtime   Refills:  0       divalproex sodium delayed-release 250 MG DR tablet   Commonly known as:  DEPAKOTE        Dose:  1750 mg   Take 1,750 mg by mouth At Bedtime   Refills:  0       docusate sodium 100 MG capsule   Commonly known as:  COLACE   Used for:  Constipation        Dose:  100 mg   Take 1 capsule (100 mg) by mouth 2 times daily   Quantity:  60 capsule   Refills:  0       EPINEPHrine 0.3 MG/0.3ML injection 2-pack   Commonly known as:  EPIPEN/ADRENACLICK/or ANY BX GENERIC EQUIV        Dose:  0.3 mg   Inject 0.3 mg into the muscle once as needed for anaphylaxis   Refills:  0       * HYDROXYZINE HCL PO        Dose:  50 mg   Take 50 mg by mouth At Bedtime   Refills:  0       * hydrOXYzine 25 MG tablet   Commonly known as:  ATARAX        Dose:  25 mg   Take 1 tablet (25 mg) by mouth 2 times daily as needed for itching or other (anxiety)   Quantity:  120 tablet   Refills:  0       ibuprofen 600 MG tablet   Commonly known as:  ADVIL/MOTRIN        Dose:  600 mg   Take 600 mg by mouth   Refills:  0       ketorolac 0.5 % ophthalmic solution   Commonly known as:  ACULAR   Used for:  Combined form of age-related cataract, both eyes        Dose:  1 drop   Place 1 drop into the right eye 4 times daily   Quantity:  1 Bottle   Refills:  0       lactulose 10 GM/15ML solution   Commonly known as:  CHRONULAC        Dose:  20 g   Take 20 g by mouth 2 times daily   Refills:  0       lisinopril 10 MG tablet   Commonly known as:  PRINIVIL/ZESTRIL   Used for:  HTN (hypertension)        Dose:  10 mg   Take 1 tablet (10 mg) by mouth daily   Quantity:  30 tablet   Refills:  0       LOXITANE PO        Dose:  40 mg   Take 40 mg by mouth At Bedtime   Refills:  0       MELATONIN PO        Dose:  6 mg   Take 6 mg by mouth At Bedtime   Refills:  0       methyl salicylate-menthol Oint ointment    Used for:  Muscle pain        Dose:  50 g   Apply 50 g topically every 6 hours as needed   Quantity:  1 Tube   Refills:  0       moxifloxacin 0.5 % ophthalmic solution   Commonly known as:  VIGAMOX   Used for:  Combined form of age-related cataract, both eyes        Dose:  1 drop   Place 1 drop into the right eye 4 times daily   Quantity:  1 Bottle   Refills:  0       omeprazole 20 MG CR capsule   Commonly known as:  priLOSEC        Dose:  20 mg   Take 20 mg by mouth daily.   Refills:  0       polyethylene glycol Packet   Commonly known as:  MIRALAX/GLYCOLAX   Used for:  Constipation        Dose:  1 packet   Take 17 g by mouth 2 times daily   Quantity:  60 packet   Refills:  0       potassium chloride 20 MEQ Packet   Commonly known as:  KLOR-CON        Dose:  20 mEq   Take 20 mEq by mouth 2 times daily   Refills:  0       propranolol 20 MG tablet   Commonly known as:  INDERAL        Dose:  20 mg   Take 1 tablet (20 mg) by mouth 2 times daily   Quantity:  90 tablet   Refills:  0       SEROQUEL PO        Dose:  100 mg   Take 100 mg by mouth At Bedtime   Refills:  0       topiramate 100 MG tablet   Commonly known as:  TOPAMAX   Used for:  Bipolar I disorder, most recent episode (or current) manic, unspecified        Dose:  100 mg   Take 1 tablet (100 mg) by mouth 3 times daily   Quantity:  90 tablet   Refills:  0       traMADol 50 MG tablet   Commonly known as:  ULTRAM        Dose:  50 mg   Take 50 mg by mouth every 6 hours as needed Not to exceed 100 mg in 24 hours   Refills:  0       traZODone HCl 150 MG 24 hr tablet   Commonly known as:  OLEPTRO        Dose:  150 mg   Take 150 mg by mouth nightly as needed   Refills:  0       * Notice:  This list has 4 medication(s) that are the same as other medications prescribed for you. Read the directions carefully, and ask your doctor or other care provider to review them with you.             Protect others around you: Learn how to safely use, store and throw away your  medicines at www.disposemymeds.org.             Medication List: This is a list of all your medications and when to take them. Check marks below indicate your daily home schedule. Keep this list as a reference.      Medications           Morning Afternoon Evening Bedtime As Needed    acetaminophen 500 MG tablet   Commonly known as:  TYLENOL   Take 1-2 tablets (500-1,000 mg) by mouth every 6 hours as needed for mild pain                                alendronate 70 MG tablet   Commonly known as:  FOSAMAX   Take 1 tablet (70 mg) by mouth every 7 days                                AMANTADINE HCL PO   Take 50 mg by mouth 2 times daily                                aspirin 325 MG tablet   Take 1 tablet (325 mg) by mouth daily                                * BACLOFEN PO   Take 20 mg by mouth                                * baclofen 20 MG tablet   Commonly known as:  LIORESAL   Take 1 tablet (20 mg) by mouth 4 times daily                                cholecalciferol 2000 units Caps   Take 1 tablet by mouth daily                                ciprofloxacin 500 MG tablet   Commonly known as:  CIPRO   Take 1 tablet (500 mg) by mouth every 12 hours                                CLONAZEPAM PO   Take 1 mg by mouth 3 times daily   Last time this was given:  0.5 mg on 10/22/2018 11:32 AM                                clotrimazole 1 % cream   Commonly known as:  LOTRIMIN   Place 1 Applicatorful vaginally At Bedtime                                divalproex sodium delayed-release 250 MG DR tablet   Commonly known as:  DEPAKOTE   Take 1,750 mg by mouth At Bedtime                                docusate sodium 100 MG capsule   Commonly known as:  COLACE   Take 1 capsule (100 mg) by mouth 2 times daily                                EPINEPHrine 0.3 MG/0.3ML injection 2-pack   Commonly known as:  EPIPEN/ADRENACLICK/or ANY BX GENERIC EQUIV   Inject 0.3 mg into the muscle once as needed for anaphylaxis                                 * HYDROXYZINE HCL PO   Take 50 mg by mouth At Bedtime                                * hydrOXYzine 25 MG tablet   Commonly known as:  ATARAX   Take 1 tablet (25 mg) by mouth 2 times daily as needed for itching or other (anxiety)                                ibuprofen 600 MG tablet   Commonly known as:  ADVIL/MOTRIN   Take 600 mg by mouth                                ketorolac 0.5 % ophthalmic solution   Commonly known as:  ACULAR   Place 1 drop into the right eye 4 times daily                                lactulose 10 GM/15ML solution   Commonly known as:  CHRONULAC   Take 20 g by mouth 2 times daily                                lisinopril 10 MG tablet   Commonly known as:  PRINIVIL/ZESTRIL   Take 1 tablet (10 mg) by mouth daily                                LOXITANE PO   Take 40 mg by mouth At Bedtime                                MELATONIN PO   Take 6 mg by mouth At Bedtime                                methyl salicylate-menthol Oint ointment   Apply 50 g topically every 6 hours as needed                                moxifloxacin 0.5 % ophthalmic solution   Commonly known as:  VIGAMOX   Place 1 drop into the right eye 4 times daily                                omeprazole 20 MG CR capsule   Commonly known as:  priLOSEC   Take 20 mg by mouth daily.                                polyethylene glycol Packet   Commonly known as:  MIRALAX/GLYCOLAX   Take 17 g by mouth 2 times daily                                potassium chloride 20 MEQ Packet   Commonly known as:  KLOR-CON   Take 20 mEq by mouth 2 times daily                                propranolol 20 MG tablet   Commonly known as:  INDERAL   Take 1 tablet (20 mg) by mouth 2 times daily                                SEROQUEL PO   Take 100 mg by mouth At Bedtime                                topiramate 100 MG tablet   Commonly known as:  TOPAMAX   Take 1 tablet (100 mg) by mouth 3 times daily                                traMADol 50 MG  tablet   Commonly known as:  ULTRAM   Take 50 mg by mouth every 6 hours as needed Not to exceed 100 mg in 24 hours                                traZODone HCl 150 MG 24 hr tablet   Commonly known as:  OLEPTRO   Take 150 mg by mouth nightly as needed                                * Notice:  This list has 4 medication(s) that are the same as other medications prescribed for you. Read the directions carefully, and ask your doctor or other care provider to review them with you.

## 2018-10-23 ENCOUNTER — OFFICE VISIT (OUTPATIENT)
Dept: OPHTHALMOLOGY | Facility: CLINIC | Age: 63
End: 2018-10-23
Payer: MEDICARE

## 2018-10-23 DIAGNOSIS — Z96.1 PSEUDOPHAKIA OF RIGHT EYE: Primary | ICD-10-CM

## 2018-10-23 PROCEDURE — 99024 POSTOP FOLLOW-UP VISIT: CPT | Performed by: STUDENT IN AN ORGANIZED HEALTH CARE EDUCATION/TRAINING PROGRAM

## 2018-10-23 ASSESSMENT — EXTERNAL EXAM - LEFT EYE: OS_EXAM: NORMAL

## 2018-10-23 ASSESSMENT — VISUAL ACUITY
METHOD: SNELLEN - LINEAR
OD_SC: 20/100-1
OD_PH_SC: 20/50

## 2018-10-23 ASSESSMENT — SLIT LAMP EXAM - LIDS
COMMENTS: NORMAL
COMMENTS: NORMAL

## 2018-10-23 ASSESSMENT — EXTERNAL EXAM - RIGHT EYE: OD_EXAM: NORMAL

## 2018-10-23 ASSESSMENT — TONOMETRY
OD_IOP_MMHG: 14
IOP_METHOD: ICARE

## 2018-10-23 NOTE — PATIENT INSTRUCTIONS
POST-OP CATARACT INSTRUCTIONS    *   Use the following drop(s) in the RIGHT EYE four times a day:        continue Vigamox or ofloxacin (tan top), and ketorolac (grey top) four times a day until the bottles run out.        (WAIT AT LEAST 5 MINUTES BETWEEN DROPS)    *   Patient is not using Prednisolone drops due to allergy.     *   If you are taking glaucoma drops, continue as usual.    *   Wear eye shield when sleeping for one week.    *   No eye rubbing or lifting more than 10 pounds for one week.    *   Keep water out of eye for two weeks.    *   OK to resume aspirin and/or other blood thinners.    *   Return as scheduled in about one week.    *   If your vision worsens, eye becomes increasingly red, or becomes painful, call [phone].     Cintia Orosco M.D.

## 2018-10-23 NOTE — PROGRESS NOTES
Current Eye Medications:  Vigamox and ketorolac right eye four times a day.     Subjective:  po1 right eye   Pt reports her vision seem blurred in this eye. Right eye is comfortable.      Objective:  See Ophthalmology Exam.       Assessment:  Shelley Greenwood is a 63 year old female who presents with:   Encounter Diagnosis   Name Primary?     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 No prednisolone due to allergy listing.        Plan:  POST-OP CATARACT INSTRUCTIONS    *   Use the following drop(s) in the RIGHT EYE four times a day:        continue Vigamox or ofloxacin (tan top), and ketorolac (grey top) four times a day until the bottles run out.        (WAIT AT LEAST 5 MINUTES BETWEEN DROPS)    *   Patient is not using Prednisolone drops due to allergy.   *   If you are taking glaucoma drops, continue as usual.  *   Wear eye shield when sleeping for one week.  *   No eye rubbing or lifting more than 10 pounds for one week.  *   Keep water out of eye for two weeks.  *   OK to resume aspirin and/or other blood thinners.  *   Return as scheduled in about one week.  *   If your vision worsens, eye becomes increasingly red, or becomes painful, call [phone].     Cintia Orosco M.D.

## 2018-10-23 NOTE — LETTER
10/23/2018      RE: Shelley Greenwood  [address]  [place] [address]    Dear Colleague,    Thank you for referring your patient, Shelley Greenwood, to the [facility].     She is doing well after cataract surgery in the right eye. Please see a copy of my visit note below.     Current Eye Medications:  Vigamox and ketorolac right eye four times a day.     Subjective:  po1 right eye   Pt reports her vision seem blurred in this eye. Right eye is comfortable.      Objective:  See Ophthalmology Exam.       Assessment:  Shelley Greenwood is a 63 year old female who presents with:   Encounter Diagnosis   Name Primary?     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 No prednisolone due to allergy listing.        Plan:  POST-OP CATARACT INSTRUCTIONS    *   Use the following drop(s) in the RIGHT EYE four times a day:        continue Vigamox or ofloxacin (tan top), and ketorolac (grey top) four times a day until the bottles run out.        (WAIT AT LEAST 5 MINUTES BETWEEN DROPS)    *   Patient is not using Prednisolone drops due to allergy.   *   If you are taking glaucoma drops, continue as usual.  *   Wear eye shield when sleeping for one week.  *   No eye rubbing or lifting more than 10 pounds for one week.  *   Keep water out of eye for two weeks.  *   OK to resume aspirin and/or other blood thinners.  *   Return as scheduled in about one week.  *   If your vision worsens, eye becomes increasingly red, or becomes painful, call [phone].     Cintia Orosco M.D.      Again, thank you for allowing me to participate in the care of your patient.        Sincerely,        Cintia Orosco MD

## 2018-10-23 NOTE — MR AVS SNAPSHOT
After Visit Summary   10/23/2018    Shelley Greenwood    MRN: [id number]           Patient Information     Date Of Birth          1955        Visit Information        Provider Department      10/23/2018 10:45 AM Cintia Orosco MD [facility]        Today's Diagnoses     Pseudophakia of right eye    -  1      Care Instructions    POST-OP CATARACT INSTRUCTIONS    *   Use the following drop(s) in the RIGHT EYE four times a day:        continue Vigamox or ofloxacin (tan top), and ketorolac (grey top) four times a day until the bottles run out.        (WAIT AT LEAST 5 MINUTES BETWEEN DROPS)    *   Patient is not using Prednisolone drops due to allergy.     *   If you are taking glaucoma drops, continue as usual.    *   Wear eye shield when sleeping for one week.    *   No eye rubbing or lifting more than 10 pounds for one week.    *   Keep water out of eye for two weeks.    *   OK to resume aspirin and/or other blood thinners.    *   Return as scheduled in about one week.    *   If your vision worsens, eye becomes increasingly red, or becomes painful, call [phone].     Cintia Orosco M.D.          Follow-ups after your visit        Follow-up notes from your care team     Return for PO2, as scheduled.      Your next 10 appointments already scheduled     Oct 31, 2018  1:45 PM CDT   [facility] ([address] [id number] [phone]            Nov 28, 2018 12:45 PM CST   Return Visit with MD [facility] ([address] [id number] [phone]              Who to contact     If you have questions or need follow up information about today's clinic visit or your schedule please contact [facility] directly at [phone].  Normal or non-critical lab and imaging results will be  communicated to you by MyChart, letter or phone within 4 business days after the clinic has received the results. If you do not hear from us within 7 days, please contact the clinic through MyChart or phone. If you have a critical or abnormal lab result, we will notify you by phone as soon as possible.  Submit refill requests through [employer] or call your pharmacy and they will forward the refill request to us. Please allow 3 business days for your refill to be completed.          Additional Information About Your Visit        Care EveryWhere ID     This is your Care EveryWhere ID. This could be used by other organizations to access your [place] medical records  [id number]         Blood Pressure from Last 3 Encounters:   10/22/18 152/90   03/03/15 146/77   01/04/15 127/78    Weight from Last 3 Encounters:   10/22/18 108.9 kg (240 lb)   03/02/15 81.6 kg (180 lb)   01/04/15 83 kg (183 lb)              Today, you had the following     No orders found for display       Primary Care Provider Office Phone # Fax #    Genaro Pedroza [phone]       [id number]  [facility] [address]        Equal Access to Services     ARCADIO WAYNE : Hadii bessie ku hadasho Soomaali, waaxda luqadaha, qaybta kaalmada adeegyada, spencer hein. So [facility] [place] [phone].    ATENCIÓN: Si habla español, tiene a carr disposición servicios gratuitos de asistencia lingüística. Enrique al [phone].    We comply with applicable federal civil rights laws and [place] laws. We do not discriminate on the basis of race, color, national origin, age, disability, sex, sexual orientation, or gender identity.            Thank you!     Thank you for choosing [facility]  for your care. Our goal is always to provide you with excellent care. Hearing back from our patients is one way we can continue to improve our services. Please take a few minutes to complete the written survey  that you may receive in the mail after your visit with us. Thank you!             Your Updated Medication List - Protect others around you: Learn how to safely use, store and throw away your medicines at www.disposemymeds.org.          This list is accurate as of 10/23/18 11:22 AM.  Always use your most recent med list.                   Brand Name Dispense Instructions for use Diagnosis    acetaminophen 500 MG tablet    TYLENOL    160 tablet    Take 1-2 tablets (500-1,000 mg) by mouth every 6 hours as needed for mild pain    Muscle pain       alendronate 70 MG tablet    FOSAMAX    10 tablet    Take 1 tablet (70 mg) by mouth every 7 days    Osteoporosis, unspecified       AMANTADINE HCL PO      Take 50 mg by mouth 2 times daily        aspirin 325 MG tablet     30 tablet    Take 1 tablet (325 mg) by mouth daily    CAD (coronary artery disease)       * BACLOFEN PO      Take 20 mg by mouth        * baclofen 20 MG tablet    LIORESAL    120 tablet    Take 1 tablet (20 mg) by mouth 4 times daily    Muscle spasticity       cholecalciferol 2000 units Caps      Take 1 tablet by mouth daily        ciprofloxacin 500 MG tablet    CIPRO    12 tablet    Take 1 tablet (500 mg) by mouth every 12 hours    UTI (urinary tract infection)       CLONAZEPAM PO      Take 1 mg by mouth 3 times daily        clotrimazole 1 % cream    LOTRIMIN     Place 1 Applicatorful vaginally At Bedtime        divalproex sodium delayed-release 250 MG DR tablet    DEPAKOTE     Take 1,750 mg by mouth At Bedtime        docusate sodium 100 MG capsule    COLACE    60 capsule    Take 1 capsule (100 mg) by mouth 2 times daily    Constipation       EPINEPHrine 0.3 MG/0.3ML injection 2-pack    EPIPEN/ADRENACLICK/or ANY BX GENERIC EQUIV     Inject 0.3 mg into the muscle once as needed for anaphylaxis        * HYDROXYZINE HCL PO      Take 50 mg by mouth At Bedtime        * hydrOXYzine 25 MG tablet    ATARAX    120 tablet    Take 1 tablet (25 mg) by mouth 2 times daily  as needed for itching or other (anxiety)        ibuprofen 600 MG tablet    ADVIL/MOTRIN     Take 600 mg by mouth        ketorolac 0.5 % ophthalmic solution    ACULAR    1 Bottle    Place 1 drop into the right eye 4 times daily    Combined form of age-related cataract, both eyes       lactulose 10 GM/15ML solution    CHRONULAC     Take 20 g by mouth 2 times daily        lisinopril 10 MG tablet    PRINIVIL/ZESTRIL    30 tablet    Take 1 tablet (10 mg) by mouth daily    HTN (hypertension)       LOXITANE PO      Take 40 mg by mouth At Bedtime        MELATONIN PO      Take 6 mg by mouth At Bedtime        methyl salicylate-menthol Oint ointment     1 Tube    Apply 50 g topically every 6 hours as needed    Muscle pain       moxifloxacin 0.5 % ophthalmic solution    VIGAMOX    1 Bottle    Place 1 drop into the right eye 4 times daily    Combined form of age-related cataract, both eyes       omeprazole 20 MG CR capsule    priLOSEC     Take 20 mg by mouth daily.        polyethylene glycol Packet    MIRALAX/GLYCOLAX    60 packet    Take 17 g by mouth 2 times daily    Constipation       potassium chloride 20 MEQ Packet    KLOR-CON     Take 20 mEq by mouth 2 times daily        propranolol 20 MG tablet    INDERAL    90 tablet    Take 1 tablet (20 mg) by mouth 2 times daily        SEROQUEL PO      Take 100 mg by mouth At Bedtime        topiramate 100 MG tablet    TOPAMAX    90 tablet    Take 1 tablet (100 mg) by mouth 3 times daily    Bipolar I disorder, most recent episode (or current) manic, unspecified       traMADol 50 MG tablet    ULTRAM     Take 50 mg by mouth every 6 hours as needed Not to exceed 100 mg in 24 hours        traZODone HCl 150 MG 24 hr tablet    OLEPTRO     Take 150 mg by mouth nightly as needed        * Notice:  This list has 4 medication(s) that are the same as other medications prescribed for you. Read the directions carefully, and ask your doctor or other care provider to review them with you.

## 2018-10-31 ENCOUNTER — OFFICE VISIT (OUTPATIENT)
Dept: OPHTHALMOLOGY | Facility: CLINIC | Age: 63
End: 2018-10-31
Payer: MEDICARE

## 2018-10-31 DIAGNOSIS — H35.371 EPIRETINAL MEMBRANE, RIGHT EYE: ICD-10-CM

## 2018-10-31 DIAGNOSIS — H40.003 GLAUCOMA SUSPECT OF BOTH EYES: ICD-10-CM

## 2018-10-31 DIAGNOSIS — Z96.1 PSEUDOPHAKIA OF RIGHT EYE: Primary | ICD-10-CM

## 2018-10-31 DIAGNOSIS — G35 MULTIPLE SCLEROSIS (H): ICD-10-CM

## 2018-10-31 PROCEDURE — 99024 POSTOP FOLLOW-UP VISIT: CPT | Performed by: STUDENT IN AN ORGANIZED HEALTH CARE EDUCATION/TRAINING PROGRAM

## 2018-10-31 ASSESSMENT — EXTERNAL EXAM - LEFT EYE: OS_EXAM: NORMAL

## 2018-10-31 ASSESSMENT — VISUAL ACUITY
METHOD: SNELLEN - LINEAR
OD_SC: 20/200
OD_PH_SC: 20/60

## 2018-10-31 ASSESSMENT — REFRACTION_MANIFEST
OD_SPHERE: -2.25
OD_AXIS: 048
OD_CYLINDER: +1.25

## 2018-10-31 ASSESSMENT — SLIT LAMP EXAM - LIDS
COMMENTS: NORMAL
COMMENTS: NORMAL

## 2018-10-31 ASSESSMENT — TONOMETRY
IOP_METHOD: APPLANATION
OD_IOP_MMHG: 11

## 2018-10-31 ASSESSMENT — EXTERNAL EXAM - RIGHT EYE: OD_EXAM: NORMAL

## 2018-10-31 NOTE — PROGRESS NOTES
Current Eye Medications:  Vigamox and ketoloac right eye four times a day.       Subjective:  Status/Post Kelman Phacoemulsification with Posterior Chamber Lens right eye:  10-22-18.  Right eye is comfortable, but her vision is blurry (she is concerned).       Objective:  See Ophthalmology Exam.       Assessment:  Shelley Greenwood is a 63 year old female who presents with:   Encounter Diagnoses   Name Primary?     Pseudophakia of right eye PO2, doing well. Wishes to have cataract surgery for the left eye. (Needs to be FSH last case due to general anesthesia. BAT is visually significant left eye).      Epiretinal membrane, right eye      Glaucoma suspect of both eyes      Multiple sclerosis (H)        Plan:  *   For the right eye:   Discontinue Vigamox or ofloxacin (tan top)   Continue ketorolac (grey top) four times a day until the bottles run out. No additional refills will likely be needed.   *   Stop wearing eye shield.  *   No eye rubbing. May resume heavy lifting.  *   If you are taking glaucoma drops, continue as usual.  *   Return one month for final exam with glasses check.  *   If your vision worsens, eye becomes increasingly red, or becomes painful, call [phone].    Cintia Orosco M.D.

## 2018-10-31 NOTE — LETTER
10/31/2018         RE: Shelley Greenwood  [address] [place] [address]        Dear Colleague,    Thank you for referring your patient, Shelley Greenwood, to the [facility]. Please see a copy of my visit note below.     Current Eye Medications:  Vigamox and ketoloac right eye four times a day.       Subjective:  Status/Post Kelman Phacoemulsification with Posterior Chamber Lens right eye:  10-22-18.  Right eye is comfortable, but her vision is blurry (she is concerned).       Objective:  See Ophthalmology Exam.       Assessment:  Shelley Greenwood is a 63 year old female who presents with:   Encounter Diagnoses   Name Primary?     Pseudophakia of right eye PO2, doing well. Wishes to have cataract surgery for the left eye. (Needs to be FSH last case due to general anesthesia. BAT is visually significant left eye).      Epiretinal membrane, right eye      Glaucoma suspect of both eyes      Multiple sclerosis (H)        Plan:  *   For the right eye:   Discontinue Vigamox or ofloxacin (tan top)   Continue ketorolac (grey top) four times a day until the bottles run out. No additional refills will likely be needed.   *   Stop wearing eye shield.  *   No eye rubbing. May resume heavy lifting.  *   If you are taking glaucoma drops, continue as usual.  *   Return one month for final exam with glasses check.  *   If your vision worsens, eye becomes increasingly red, or becomes painful, call [phone].    Cintia Orosco M.D.      Again, thank you for allowing me to participate in the care of your patient.        Sincerely,        Cintia Orosco MD

## 2018-10-31 NOTE — PATIENT INSTRUCTIONS
*   For the right eye:   Discontinue Vigamox or ofloxacin (tan top)   Continue ketorolac (grey top) four times a day until the bottles run out. No additional refills will likely be needed.     *   Stop wearing eye shield.    *   No eye rubbing. May resume heavy lifting.    *   If you are taking glaucoma drops, continue as usual.    *   Return one month for final exam with glasses check.    *   If your vision worsens, eye becomes increasingly red, or becomes painful, call [phone].    Cintia Orosco M.D.

## 2018-10-31 NOTE — MR AVS SNAPSHOT
After Visit Summary   10/31/2018    Shelley Greenwood    MRN: [id number]           Patient Information     Date Of Birth          1955        Visit Information        Provider Department      10/31/2018 1:45 PM Cintia [facility]rosc[facility] MD [facility]        Today's Diagnoses     Pseudophakia of right eye    -  1      Care Instructions    *   For the right eye:   Discontinue Vigamox or ofloxacin (tan top)   Continue ketorolac (grey top) four times a day until the bottles run out.  No additional refills will likely be needed.     *   Stop wearing eye shield.    *   No eye rubbing. May resume heavy lifting.    *   If you are taking glaucoma drops, continue as usual.    *   Return one month for final exam with glasses check.    *   If your vision worsens, eye becomes increasingly red, or becomes painful, call [phone].    Cintia Orosco M.D.          Follow-ups after your visit        Follow-up notes from your care team     Return for Final MR, as scheduled.      Your next 10 appointments already scheduled     Nov 28, 2018 12:45 PM CST   Return Visit with Cintia Orosco MD   [facility] ([facility])    [address] [place] [id number] [phone]              Who to contact     If you have questions or need follow up information about today's clinic visit or your schedule please contact [facility] directly at [phone].  Normal or non-critical lab and imaging results will be communicated to you by MyChart, letter or phone within 4 business days after the clinic has received the results. If you do not hear from us within 7 days, please contact the clinic through MyChart or phone. If you have a critical or abnormal lab result, we will notify you by phone as soon as possible.  Submit refill requests through [employer] or call your pharmacy and they will forward the refill request to us. Please allow 3 business days for your refill to  be completed.          Additional Information About Your Visit        Care EveryWhere ID     This is your Care EveryWhere ID. This could be used by other organizations to access your [facility] medical records  [id number]         Blood Pressure from Last 3 Encounters:   10/22/18 152/90   03/03/15 146/77   01/04/15 127/78    Weight from Last 3 Encounters:   10/22/18 108.9 kg (240 lb)   03/02/15 81.6 kg (180 lb)   01/04/15 83 kg (183 lb)              Today, you had the following     No orders found for display       Primary Care Provider Office Phone # Fax #    Genaro Pedroza [phone]       [id number]  [facility] [address]        Equal Access to Services     ARCADIO WAYNE : Hadii aad rico hadasho Soomaali, waaxda luqadaha, qaybta kaalmada adeegyada, waxay idiin hayaan iron khjohn perez . So [facility] [phone].    ATENCIÓN: Si habla español, tiene a carr disposición servicios gratuitos de asistencia lingüística. Llame al [phone].    We comply with applicable federal civil rights laws and [place] laws. We do not discriminate on the basis of race, color, national origin, age, disability, sex, sexual orientation, or gender identity.            Thank you!     Thank you for choosing [facility]  for your care. Our goal is always to provide you with excellent care. Hearing back from our patients is one way we can continue to improve our services. Please take a few minutes to complete the written survey that you may receive in the mail after your visit with us. Thank you!             Your Updated Medication List - Protect others around you: Learn how to safely use, store and throw away your medicines at www.disposemymeds.org.          This list is accurate as of 10/31/18  2:25 PM.  Always use your most recent med list.                   Brand Name Dispense Instructions for use Diagnosis    acetaminophen 500 MG tablet    TYLENOL    160 tablet    Take 1-2  tablets (500-1,000 mg) by mouth every 6 hours as needed for mild pain    Muscle pain       alendronate 70 MG tablet    FOSAMAX    10 tablet    Take 1 tablet (70 mg) by mouth every 7 days    Osteoporosis, unspecified       AMANTADINE HCL PO      Take 50 mg by mouth 2 times daily        aspirin 325 MG tablet     30 tablet    Take 1 tablet (325 mg) by mouth daily    CAD (coronary artery disease)       * BACLOFEN PO      Take 20 mg by mouth        * baclofen 20 MG tablet    LIORESAL    120 tablet    Take 1 tablet (20 mg) by mouth 4 times daily    Muscle spasticity       cholecalciferol 2000 units Caps      Take 1 tablet by mouth daily        ciprofloxacin 500 MG tablet    CIPRO    12 tablet    Take 1 tablet (500 mg) by mouth every 12 hours    UTI (urinary tract infection)       CLONAZEPAM PO      Take 1 mg by mouth 3 times daily        clotrimazole 1 % cream    LOTRIMIN     Place 1 Applicatorful vaginally At Bedtime        divalproex sodium delayed-release 250 MG DR tablet    DEPAKOTE     Take 1,750 mg by mouth At Bedtime        docusate sodium 100 MG capsule    COLACE    60 capsule    Take 1 capsule (100 mg) by mouth 2 times daily    Constipation       EPINEPHrine 0.3 MG/0.3ML injection 2-pack    EPIPEN/ADRENACLICK/or ANY BX GENERIC EQUIV     Inject 0.3 mg into the muscle once as needed for anaphylaxis        * HYDROXYZINE HCL PO      Take 50 mg by mouth At Bedtime        * hydrOXYzine 25 MG tablet    ATARAX    120 tablet    Take 1 tablet (25 mg) by mouth 2 times daily as needed for itching or other (anxiety)        ibuprofen 600 MG tablet    ADVIL/MOTRIN     Take 600 mg by mouth        ketorolac 0.5 % ophthalmic solution    ACULAR    1 Bottle    Place 1 drop into the right eye 4 times daily    Combined form of age-related cataract, both eyes       lactulose 10 GM/15ML solution    CHRONULAC     Take 20 g by mouth 2 times daily        lisinopril 10 MG tablet    PRINIVIL/ZESTRIL    30 tablet    Take 1 tablet (10 mg) by  mouth daily    HTN (hypertension)       LOXITANE PO      Take 40 mg by mouth At Bedtime        MELATONIN PO      Take 6 mg by mouth At Bedtime        methyl salicylate-menthol Oint ointment     1 Tube    Apply 50 g topically every 6 hours as needed    Muscle pain       moxifloxacin 0.5 % ophthalmic solution    VIGAMOX    1 Bottle    Place 1 drop into the right eye 4 times daily    Combined form of age-related cataract, both eyes       omeprazole 20 MG CR capsule    priLOSEC     Take 20 mg by mouth daily.        polyethylene glycol Packet    MIRALAX/GLYCOLAX    60 packet    Take 17 g by mouth 2 times daily    Constipation       potassium chloride 20 MEQ Packet    KLOR-CON     Take 20 mEq by mouth 2 times daily        propranolol 20 MG tablet    INDERAL    90 tablet    Take 1 tablet (20 mg) by mouth 2 times daily        SEROQUEL PO      Take 100 mg by mouth At Bedtime        topiramate 100 MG tablet    TOPAMAX    90 tablet    Take 1 tablet (100 mg) by mouth 3 times daily    Bipolar I disorder, most recent episode (or current) manic, unspecified       traMADol 50 MG tablet    ULTRAM     Take 50 mg by mouth every 6 hours as needed Not to exceed 100 mg in 24 hours        traZODone HCl 150 MG 24 hr tablet    OLEPTRO     Take 150 mg by mouth nightly as needed        * Notice:  This list has 4 medication(s) that are the same as other medications prescribed for you. Read the directions carefully, and ask your doctor or other care provider to review them with you.

## 2018-11-26 ENCOUNTER — RECORDS - HEALTHEAST (OUTPATIENT)
Dept: LAB | Facility: CLINIC | Age: 63
End: 2018-11-26

## 2018-11-26 LAB
ALBUMIN UR-MCNC: NEGATIVE MG/DL
APPEARANCE UR: CLEAR
BACTERIA #/AREA URNS HPF: ABNORMAL HPF
BILIRUB UR QL STRIP: NEGATIVE
COLOR UR AUTO: ABNORMAL
GLUCOSE UR STRIP-MCNC: NEGATIVE MG/DL
HGB UR QL STRIP: ABNORMAL
KETONES UR STRIP-MCNC: NEGATIVE MG/DL
LEUKOCYTE ESTERASE UR QL STRIP: NEGATIVE
NITRATE UR QL: POSITIVE
PH UR STRIP: 5.5 [PH] (ref 4.5–8)
RBC #/AREA URNS AUTO: ABNORMAL HPF
SP GR UR STRIP: 1.01 (ref 1–1.03)
SQUAMOUS #/AREA URNS AUTO: ABNORMAL LPF
UROBILINOGEN UR STRIP-ACNC: ABNORMAL
WBC #/AREA URNS AUTO: ABNORMAL HPF

## 2018-11-28 LAB — BACTERIA SPEC CULT: ABNORMAL

## 2018-11-30 ENCOUNTER — RECORDS - HEALTHEAST (OUTPATIENT)
Dept: LAB | Facility: CLINIC | Age: 63
End: 2018-11-30

## 2018-11-30 ENCOUNTER — OFFICE VISIT (OUTPATIENT)
Dept: OPHTHALMOLOGY | Facility: CLINIC | Age: 63
End: 2018-11-30
Payer: MEDICARE

## 2018-11-30 DIAGNOSIS — Z96.1 PSEUDOPHAKIA OF RIGHT EYE: Primary | ICD-10-CM

## 2018-11-30 DIAGNOSIS — H40.003 GLAUCOMA SUSPECT OF BOTH EYES: ICD-10-CM

## 2018-11-30 DIAGNOSIS — G35 MULTIPLE SCLEROSIS (H): ICD-10-CM

## 2018-11-30 DIAGNOSIS — H25.813 COMBINED FORM OF AGE-RELATED CATARACT, BOTH EYES: ICD-10-CM

## 2018-11-30 DIAGNOSIS — H35.371 EPIRETINAL MEMBRANE, RIGHT EYE: ICD-10-CM

## 2018-11-30 LAB
ANION GAP SERPL CALCULATED.3IONS-SCNC: 7 MMOL/L (ref 5–18)
BUN SERPL-MCNC: 13 MG/DL (ref 8–22)
CALCIUM SERPL-MCNC: 10.2 MG/DL (ref 8.5–10.5)
CHLORIDE BLD-SCNC: 104 MMOL/L (ref 98–107)
CO2 SERPL-SCNC: 26 MMOL/L (ref 22–31)
CREAT SERPL-MCNC: 0.64 MG/DL (ref 0.6–1.1)
GFR SERPL CREATININE-BSD FRML MDRD: >60 ML/MIN/1.73M2
GLUCOSE BLD-MCNC: 106 MG/DL (ref 70–125)
POTASSIUM BLD-SCNC: 3.8 MMOL/L (ref 3.5–5)
SODIUM SERPL-SCNC: 137 MMOL/L (ref 136–145)

## 2018-11-30 PROCEDURE — 99024 POSTOP FOLLOW-UP VISIT: CPT | Performed by: STUDENT IN AN ORGANIZED HEALTH CARE EDUCATION/TRAINING PROGRAM

## 2018-11-30 ASSESSMENT — VISUAL ACUITY
OD_SC: 20/125
OD_SC+: -1
METHOD: SNELLEN - LINEAR
OS_PH_SC: 20/80
OS_SC: 20/300
OD_PH_SC: 20/50-2

## 2018-11-30 ASSESSMENT — CUP TO DISC RATIO: OD_RATIO: 0.65

## 2018-11-30 ASSESSMENT — REFRACTION_MANIFEST
OD_CYLINDER: +1.25
OD_ADD: +2.75
OD_SPHERE: -2.75
OD_AXIS: 055

## 2018-11-30 ASSESSMENT — EXTERNAL EXAM - RIGHT EYE: OD_EXAM: NORMAL

## 2018-11-30 ASSESSMENT — TONOMETRY
IOP_METHOD: APPLANATION
OD_IOP_MMHG: 16

## 2018-11-30 ASSESSMENT — SLIT LAMP EXAM - LIDS
COMMENTS: NORMAL
COMMENTS: NORMAL

## 2018-11-30 ASSESSMENT — EXTERNAL EXAM - LEFT EYE: OS_EXAM: NORMAL

## 2018-11-30 NOTE — PROGRESS NOTES
Current Eye Medications: Ran out of Acular recently - no eyedrops at present.      Subjective: Here for final MR right eye. Would like to discuss the left eye today as well.      Objective:  See Ophthalmology Exam.       Assessment:  Shelley Greenwood is a 63 year old female who presents with:   Encounter Diagnoses   Name Primary?     Pseudophakia of right eye Final MR right eye, doing well.   OK to schedule [facility]. Will be general anesthetic (last case).      Multiple sclerosis (H)      Glaucoma suspect of both eyes      Epiretinal membrane, right eye        Plan:  *  Discontinue all drops, unless used prior to cataract surgery.  * May fill prescription for new glasses if not having surgery in the left eye within the next 6 months (wait on updating glasses if you are having surgery after the new year)  *  Return to clinic in 1 year for a complete eye exam or can schedule left eye surgery at [facility] if you wish.    Cintia Orosco M.D.  [phone]

## 2018-11-30 NOTE — LETTER
11/30/2018         RE: Shelley Greenwood  [address] [place] [address]        Dear Colleague,    Thank you for referring your patient, Shelley Greenwood, to the [facility].     She is doing well after cataract surgery in the right eye. Please see a copy of my visit note below.     Current Eye Medications: Ran out of Acular recently - no eyedrops at present.      Subjective: Here for final MR right eye. Would like to discuss the left eye today as well.      Objective:  See Ophthalmology Exam.       Assessment:  Shelley Greenwood is a 63 year old female who presents with:   Encounter Diagnoses   Name Primary?     Pseudophakia of right eye Final MR right eye, doing well.   OK to schedule cataract left eye at [facility]. Will be general anesthetic (last case).      Multiple sclerosis (H)      Glaucoma suspect of both eyes      Epiretinal membrane, right eye        Plan:  *  Discontinue all drops, unless used prior to cataract surgery.  * May fill prescription for new glasses if not having surgery in the left eye within the next 6 months (wait on updating glasses if you are having surgery after the new year)  *  Return to clinic in 1 year for a complete eye exam or can schedule left eye surgery at [facility] if you wish.    Cintia Orosco M.D.  [phone]      Again, thank you for allowing me to participate in the care of your patient.        Sincerely,        Cintia Orosco MD

## 2018-11-30 NOTE — PATIENT INSTRUCTIONS
*  Discontinue all drops, unless used prior to cataract surgery.    * May fill prescription for new glasses    *  Return to clinic in 1 year for a complete eye exam or can schedule left eye surgery at [facility] if you wish.    Cintia Orosco M.D.  [phone]

## 2018-11-30 NOTE — MR AVS SNAPSHOT
After Visit Summary   11/30/2018    Shelley Greenwood    MRN: [id number]           Patient Information     Date Of Birth          1955        Visit Information        Provider Department      11/30/2018 10:45 AM Cintia Orosco MD [facility]        Today's Diagnoses     Pseudophakia of right eye    -  1    Multiple sclerosis (H)        Glaucoma suspect of both eyes        Epiretinal membrane, right eye          Care Instructions    *  Discontinue all drops, unless used prior to cataract surgery.    * May fill prescription for new glasses    *  Return to clinic in 1 year for a complete eye exam or can schedule left eye surgery at [facility] if you wish.    Cintia Orosco M.D.  [phone]          Follow-ups after your visit        Follow-up notes from your care team     Return in about 1 year (around 11/30/2019) for Complete Exam or pre-op cataract for left eye sooner.      Who to contact     If you have questions or need follow up information about today's clinic visit or your schedule please contact [facility] directly at [phone].  Normal or non-critical lab and imaging results will be communicated to you by MyChart, letter or phone within 4 business days after the clinic has received the results. If you do not hear from us within 7 days, please contact the clinic through MyChart or phone. If you have a critical or abnormal lab result, we will notify you by phone as soon as possible.  Submit refill requests through [employer] or call your pharmacy and they will forward the refill request to us. Please allow 3 business days for your refill to be completed.          Additional Information About Your Visit        Care EveryWhere ID     This is your Care EveryWhere ID. This could be used by other organizations to access your [place] medical records  [id number]         Blood Pressure from Last 3 Encounters:   10/22/18 152/90   03/03/15 146/77   01/04/15 127/78     Weight from Last 3 Encounters:   10/22/18 108.9 kg (240 lb)   03/02/15 81.6 kg (180 lb)   01/04/15 83 kg (183 lb)              Today, you had the following     No orders found for display       Primary Care Provider Office Phone # Fax #    Genaro Pedroza [phone]       [address]  [place] [address]        Equal Access to Services     FAMILIA WAYNE : Hadii aad ku hadasho Soomaali, waaxda luqadaha, qaybta kaalmada adeegyada, waxay idiin hayaan adeeg kharash la'aan . So [facility] [phone].    ATENCIÓN: Si habla español, tiene a carr disposición servicios gratuitos de asistencia lingüística. [facility] [phone].    We comply with applicable federal civil rights laws and [place] laws. We do not discriminate on the basis of race, color, national origin, age, disability, sex, sexual orientation, or gender identity.            Thank you!     [facility] you [facility]  [facility] your [facility]. Our goal is always to provide you with excellent care. Hearing back from our patients is one way we can continue to improve our services. Please take a few minutes to complete the written survey that you may receive in the mail after your visit with us. Thank you!             Your Updated Medication List - Protect others around you: Learn how to safely use, store and throw away your medicines at www.disposemymeds.org.          This list is accurate as of 11/30/18 11:25 AM.  Always use your most recent med list.                   Brand Name Dispense Instructions for use Diagnosis    acetaminophen 500 MG tablet    TYLENOL    160 tablet    Take 1-2 tablets (500-1,000 mg) by mouth every 6 hours as needed for mild pain    Muscle pain       alendronate 70 MG tablet    FOSAMAX    10 tablet    Take 1 tablet (70 mg) by mouth every 7 days    Osteoporosis, unspecified       AMANTADINE HCL PO      Take 50 mg by mouth 2 times daily        aspirin 325 MG tablet    ASA    30 tablet     Take 1 tablet (325 mg) by mouth daily    CAD (coronary artery disease)       * BACLOFEN PO      Take 20 mg by mouth        * baclofen 20 MG tablet    LIORESAL    120 tablet    Take 1 tablet (20 mg) by mouth 4 times daily    Muscle spasticity       cholecalciferol 2000 units Caps      Take 1 tablet by mouth daily        ciprofloxacin 500 MG tablet    CIPRO    12 tablet    Take 1 tablet (500 mg) by mouth every 12 hours    UTI (urinary tract infection)       CLONAZEPAM PO      Take 1 mg by mouth 3 times daily        clotrimazole 1 % vaginal cream    LOTRIMIN     Place 1 Applicatorful vaginally At Bedtime        divalproex sodium delayed-release 250 MG DR tablet    DEPAKOTE     Take 1,750 mg by mouth At Bedtime        docusate sodium 100 MG capsule    COLACE    60 capsule    Take 1 capsule (100 mg) by mouth 2 times daily    Constipation       EPINEPHrine 0.3 MG/0.3ML injection 2-pack    EPIPEN/ADRENACLICK/or ANY BX GENERIC EQUIV     Inject 0.3 mg into the muscle once as needed for anaphylaxis        * HYDROXYZINE HCL PO      Take 50 mg by mouth At Bedtime        * hydrOXYzine 25 MG tablet    ATARAX    120 tablet    Take 1 tablet (25 mg) by mouth 2 times daily as needed for itching or other (anxiety)        ibuprofen 600 MG tablet    ADVIL/MOTRIN     Take 600 mg by mouth        lactulose 10 GM/15ML solution    CHRONULAC     Take 20 g by mouth 2 times daily        lisinopril 10 MG tablet    PRINIVIL/ZESTRIL    30 tablet    Take 1 tablet (10 mg) by mouth daily    HTN (hypertension)       LOXITANE PO      Take 40 mg by mouth At Bedtime        MELATONIN PO      Take 6 mg by mouth At Bedtime        methyl salicylate-menthol Oint ointment     1 Tube    Apply 50 g topically every 6 hours as needed    Muscle pain       omeprazole 20 MG DR capsule    priLOSEC     Take 20 mg by mouth daily.        polyethylene glycol packet    MIRALAX/GLYCOLAX    60 packet    Take 17 g by mouth 2 times daily    Constipation       potassium  chloride 20 MEQ packet    KLOR-CON     Take 20 mEq by mouth 2 times daily        propranolol 20 MG tablet    INDERAL    90 tablet    Take 1 tablet (20 mg) by mouth 2 times daily        SEROQUEL PO      Take 100 mg by mouth At Bedtime        topiramate 100 MG tablet    TOPAMAX    90 tablet    Take 1 tablet (100 mg) by mouth 3 times daily    Bipolar I disorder, most recent episode (or current) manic, unspecified       traMADol 50 MG tablet    ULTRAM     Take 50 mg by mouth every 6 hours as needed Not to exceed 100 mg in 24 hours        traZODone HCl 150 MG 24 hr tablet    OLEPTRO     Take 150 mg by mouth nightly as needed        * Notice:  This list has 4 medication(s) that are the same as other medications prescribed for you. Read the directions carefully, and ask your doctor or other care provider to review them with you.

## 2018-12-04 ENCOUNTER — RECORDS - HEALTHEAST (OUTPATIENT)
Dept: LAB | Facility: CLINIC | Age: 63
End: 2018-12-04

## 2018-12-04 LAB
ALBUMIN UR-MCNC: NEGATIVE MG/DL
APPEARANCE UR: CLEAR
BILIRUB UR QL STRIP: NEGATIVE
COLOR UR AUTO: NORMAL
GLUCOSE UR STRIP-MCNC: NEGATIVE MG/DL
HGB UR QL STRIP: NEGATIVE
KETONES UR STRIP-MCNC: NEGATIVE MG/DL
LEUKOCYTE ESTERASE UR QL STRIP: NEGATIVE
NITRATE UR QL: NEGATIVE
PH UR STRIP: 5.5 [PH] (ref 4.5–8)
SP GR UR STRIP: 1.01 (ref 1–1.03)
UROBILINOGEN UR STRIP-ACNC: NORMAL

## 2018-12-06 LAB — BACTERIA SPEC CULT: ABNORMAL

## 2019-01-14 ENCOUNTER — DOCUMENTATION ONLY (OUTPATIENT)
Dept: OPHTHALMOLOGY | Facility: CLINIC | Age: 64
End: 2019-01-14

## 2019-01-28 ENCOUNTER — RECORDS - HEALTHEAST (OUTPATIENT)
Dept: LAB | Facility: CLINIC | Age: 64
End: 2019-01-28

## 2019-01-28 LAB
ALBUMIN SERPL-MCNC: 3.1 G/DL (ref 3.5–5)
ALP SERPL-CCNC: 104 U/L (ref 45–120)
ALT SERPL W P-5'-P-CCNC: 20 U/L (ref 0–45)
AST SERPL W P-5'-P-CCNC: 15 U/L (ref 0–40)
BASOPHILS # BLD AUTO: 0.1 THOU/UL (ref 0–0.2)
BASOPHILS NFR BLD AUTO: 1 % (ref 0–2)
BILIRUB DIRECT SERPL-MCNC: <0.1 MG/DL
BILIRUB SERPL-MCNC: 0.2 MG/DL (ref 0–1)
EOSINOPHIL # BLD AUTO: 0.3 THOU/UL (ref 0–0.4)
EOSINOPHIL NFR BLD AUTO: 4 % (ref 0–6)
ERYTHROCYTE [DISTWIDTH] IN BLOOD BY AUTOMATED COUNT: 14 % (ref 11–14.5)
HCT VFR BLD AUTO: 35.9 % (ref 35–47)
HGB BLD-MCNC: 11 G/DL (ref 12–16)
LYMPHOCYTES # BLD AUTO: 2.7 THOU/UL (ref 0.8–4.4)
LYMPHOCYTES NFR BLD AUTO: 36 % (ref 20–40)
MCH RBC QN AUTO: 27.2 PG (ref 27–34)
MCHC RBC AUTO-ENTMCNC: 30.6 G/DL (ref 32–36)
MCV RBC AUTO: 89 FL (ref 80–100)
MONOCYTES # BLD AUTO: 0.7 THOU/UL (ref 0–0.9)
MONOCYTES NFR BLD AUTO: 10 % (ref 2–10)
NEUTROPHILS # BLD AUTO: 3.7 THOU/UL (ref 2–7.7)
NEUTROPHILS NFR BLD AUTO: 50 % (ref 50–70)
PLATELET # BLD AUTO: 320 THOU/UL (ref 140–440)
PMV BLD AUTO: 9.9 FL (ref 8.5–12.5)
PROT SERPL-MCNC: 6.8 G/DL (ref 6–8)
RBC # BLD AUTO: 4.04 MILL/UL (ref 3.8–5.4)
WBC: 7.5 THOU/UL (ref 4–11)

## 2019-02-09 ENCOUNTER — RECORDS - HEALTHEAST (OUTPATIENT)
Dept: LAB | Facility: CLINIC | Age: 64
End: 2019-02-09

## 2019-02-09 LAB
ALBUMIN UR-MCNC: ABNORMAL MG/DL
APPEARANCE UR: ABNORMAL
BACTERIA #/AREA URNS HPF: ABNORMAL HPF
BILIRUB UR QL STRIP: NEGATIVE
COLOR UR AUTO: YELLOW
GLUCOSE UR STRIP-MCNC: NEGATIVE MG/DL
HGB UR QL STRIP: NEGATIVE
KETONES UR STRIP-MCNC: NEGATIVE MG/DL
LEUKOCYTE ESTERASE UR QL STRIP: ABNORMAL
MUCOUS THREADS #/AREA URNS LPF: ABNORMAL LPF
NITRATE UR QL: POSITIVE
PH UR STRIP: 6 [PH] (ref 4.5–8)
RBC #/AREA URNS AUTO: ABNORMAL HPF
SP GR UR STRIP: 1.01 (ref 1–1.03)
SQUAMOUS #/AREA URNS AUTO: ABNORMAL LPF
UROBILINOGEN UR STRIP-ACNC: ABNORMAL
WBC #/AREA URNS AUTO: ABNORMAL HPF
WBC CLUMPS #/AREA URNS HPF: PRESENT /[HPF]

## 2019-02-11 LAB — BACTERIA SPEC CULT: ABNORMAL

## 2019-02-19 ENCOUNTER — TELEPHONE (OUTPATIENT)
Dept: OPHTHALMOLOGY | Facility: CLINIC | Age: 64
End: 2019-02-19

## 2019-02-19 NOTE — TELEPHONE ENCOUNTER
Type of surgery: Cataract Extraction with Intraocular Lens Implant Left Eye CPT code 29726  Combined form of age-related cataract, both eyes [H25.813]       Source Order Set   Location of surgery: [facility]  Date and time of surgery: 04/01/2019 @ 7:30am  Surgeon: Dr. Orosco  Pre-Op Appt Date: 03/20/2019  Post-Op Appt Date: 04/02/2019   Packet sent out: Yes  Pre-cert/Authorization completed: No prior auth required per Medicare and are.   Date: 02/19/2019    Insurance valid

## 2019-03-14 ENCOUNTER — RECORDS - HEALTHEAST (OUTPATIENT)
Dept: LAB | Facility: CLINIC | Age: 64
End: 2019-03-14

## 2019-03-14 LAB
ALBUMIN UR-MCNC: NEGATIVE MG/DL
APPEARANCE UR: CLEAR
BACTERIA #/AREA URNS HPF: ABNORMAL HPF
BILIRUB UR QL STRIP: NEGATIVE
COLOR UR AUTO: ABNORMAL
GLUCOSE UR STRIP-MCNC: NEGATIVE MG/DL
HGB UR QL STRIP: ABNORMAL
KETONES UR STRIP-MCNC: NEGATIVE MG/DL
LEUKOCYTE ESTERASE UR QL STRIP: ABNORMAL
NITRATE UR QL: NEGATIVE
PH UR STRIP: 6 [PH] (ref 4.5–8)
RBC #/AREA URNS AUTO: ABNORMAL HPF
SP GR UR STRIP: 1.01 (ref 1–1.03)
SQUAMOUS #/AREA URNS AUTO: ABNORMAL LPF
UROBILINOGEN UR STRIP-ACNC: ABNORMAL
WBC #/AREA URNS AUTO: ABNORMAL HPF

## 2019-03-15 ENCOUNTER — RECORDS - HEALTHEAST (OUTPATIENT)
Dept: LAB | Facility: CLINIC | Age: 64
End: 2019-03-15

## 2019-03-16 LAB — BACTERIA SPEC CULT: ABNORMAL

## 2019-03-18 LAB
ANION GAP SERPL CALCULATED.3IONS-SCNC: 9 MMOL/L (ref 5–18)
BASOPHILS # BLD AUTO: 0 THOU/UL (ref 0–0.2)
BASOPHILS NFR BLD AUTO: 1 % (ref 0–2)
BUN SERPL-MCNC: 17 MG/DL (ref 8–22)
CALCIUM SERPL-MCNC: 10.4 MG/DL (ref 8.5–10.5)
CHLORIDE BLD-SCNC: 103 MMOL/L (ref 98–107)
CO2 SERPL-SCNC: 26 MMOL/L (ref 22–31)
CREAT SERPL-MCNC: 0.68 MG/DL (ref 0.6–1.1)
EOSINOPHIL # BLD AUTO: 0.2 THOU/UL (ref 0–0.4)
EOSINOPHIL NFR BLD AUTO: 3 % (ref 0–6)
ERYTHROCYTE [DISTWIDTH] IN BLOOD BY AUTOMATED COUNT: 14.5 % (ref 11–14.5)
GFR SERPL CREATININE-BSD FRML MDRD: >60 ML/MIN/1.73M2
GLUCOSE BLD-MCNC: 113 MG/DL (ref 70–125)
HCT VFR BLD AUTO: 36.3 % (ref 35–47)
HGB BLD-MCNC: 11.1 G/DL (ref 12–16)
LYMPHOCYTES # BLD AUTO: 2.9 THOU/UL (ref 0.8–4.4)
LYMPHOCYTES NFR BLD AUTO: 41 % (ref 20–40)
MCH RBC QN AUTO: 27.5 PG (ref 27–34)
MCHC RBC AUTO-ENTMCNC: 30.6 G/DL (ref 32–36)
MCV RBC AUTO: 90 FL (ref 80–100)
MONOCYTES # BLD AUTO: 0.6 THOU/UL (ref 0–0.9)
MONOCYTES NFR BLD AUTO: 9 % (ref 2–10)
NEUTROPHILS # BLD AUTO: 3.3 THOU/UL (ref 2–7.7)
NEUTROPHILS NFR BLD AUTO: 47 % (ref 50–70)
PLATELET # BLD AUTO: 292 THOU/UL (ref 140–440)
PMV BLD AUTO: 9.5 FL (ref 8.5–12.5)
POTASSIUM BLD-SCNC: 4.1 MMOL/L (ref 3.5–5)
RBC # BLD AUTO: 4.03 MILL/UL (ref 3.8–5.4)
SODIUM SERPL-SCNC: 138 MMOL/L (ref 136–145)
WBC: 7.1 THOU/UL (ref 4–11)

## 2019-03-20 ENCOUNTER — DOCUMENTATION ONLY (OUTPATIENT)
Dept: OPHTHALMOLOGY | Facility: CLINIC | Age: 64
End: 2019-03-20

## 2019-03-20 ENCOUNTER — OFFICE VISIT (OUTPATIENT)
Dept: OPHTHALMOLOGY | Facility: CLINIC | Age: 64
End: 2019-03-20
Payer: MEDICARE

## 2019-03-20 ENCOUNTER — MEDICAL CORRESPONDENCE (OUTPATIENT)
Dept: OPHTHALMOLOGY | Facility: CLINIC | Age: 64
End: 2019-03-20

## 2019-03-20 DIAGNOSIS — H40.003 GLAUCOMA SUSPECT OF BOTH EYES: ICD-10-CM

## 2019-03-20 DIAGNOSIS — G35 MULTIPLE SCLEROSIS (H): ICD-10-CM

## 2019-03-20 DIAGNOSIS — H25.812 COMBINED FORM OF AGE-RELATED CATARACT, LEFT EYE: Primary | ICD-10-CM

## 2019-03-20 PROCEDURE — 92012 INTRM OPH EXAM EST PATIENT: CPT | Performed by: STUDENT IN AN ORGANIZED HEALTH CARE EDUCATION/TRAINING PROGRAM

## 2019-03-20 PROCEDURE — 92136 OPHTHALMIC BIOMETRY: CPT | Mod: 26 | Performed by: STUDENT IN AN ORGANIZED HEALTH CARE EDUCATION/TRAINING PROGRAM

## 2019-03-20 RX ORDER — KETOROLAC TROMETHAMINE 5 MG/ML
1 SOLUTION OPHTHALMIC 4 TIMES DAILY
Qty: 5 ML | Refills: 0 | Status: SHIPPED | OUTPATIENT
Start: 2019-03-31 | End: 2019-05-22

## 2019-03-20 RX ORDER — MOXIFLOXACIN 5 MG/ML
1 SOLUTION/ DROPS OPHTHALMIC 4 TIMES DAILY
Qty: 1 BOTTLE | Refills: 0 | Status: SHIPPED | OUTPATIENT
Start: 2019-03-31 | End: 2019-05-22

## 2019-03-20 ASSESSMENT — VISUAL ACUITY
CORRECTION_TYPE: GLASSES
OS_CC: 20/60
OD_CC: 20/40
METHOD: SNELLEN - LINEAR

## 2019-03-20 ASSESSMENT — SLIT LAMP EXAM - LIDS
COMMENTS: NORMAL
COMMENTS: NORMAL

## 2019-03-20 ASSESSMENT — EXTERNAL EXAM - RIGHT EYE: OD_EXAM: NORMAL

## 2019-03-20 ASSESSMENT — EXTERNAL EXAM - LEFT EYE: OS_EXAM: NORMAL

## 2019-03-20 NOTE — PROGRESS NOTES
Current Eye Medications:  None.      Subjective:  Patient here for pre-op cataract surgery, left eye, scheduled for 04-01-19.  History and Physical is done.  She is allergic to Prednisone.  She has not replaced the right lens, she will wait until after her left eye is done.       Objective:  See Ophthalmology Exam.       Assessment:  Shelley Greenwood is a 63 year old female who presents with:   Encounter Diagnoses   Name Primary?     Combined form of age-related cataract, left eye Pre-op left eye. GEA, no prednisolone due to allergy, dil 8. Target to match myopia of right eye.      Multiple sclerosis (H)      Glaucoma suspect of both eyes        Plan:  PRE-OP CATARACT INSTRUCTIONS    ** 2 eye drops from the pharmacy at least 3 days before surgery.  *Use the following drops in the left eye 4 times on the day before surgery and once the morning of surgery:                                 Vigamox or Ofloxacin (tan cap)   Ketorolac (gray cap)    *Please bring all your eyedrops to the surgery center.  *If taking more than one drop, wait five minutes between drops.  *No solid food or drink after midnight. Please take the starred medications (see attached list) with a small sip of water the morning of surgery.  *If you are diabetic, please do not take any diabetic medications the morning of surgery.    Cintia Orosco MD  [phone]

## 2019-03-20 NOTE — PATIENT INSTRUCTIONS
PRE-OP CATARACT INSTRUCTIONS    ** 2 eye drops from the pharmacy at least 3 days before surgery.    *Use the following drops in the left eye 4 times on the day before surgery and once the morning of surgery:                                   Vigamox or Ofloxacin (tan cap)   Ketorolac (gray cap)      *Please bring all your eyedrops to the surgery center.    *If taking more than one drop, wait five minutes between drops.    *No solid food or drink after midnight. Please take the starred medications (see attached list) with a small sip of water the morning of surgery.    *If you are diabetic, please do not take any diabetic medications the morning of surgery.    Cintia Orosco MD  [phone]

## 2019-03-20 NOTE — LETTER
3/20/2019         RE: Shelley Greenwood  [facility]  [place] [address]        Dear Colleague,    Thank you for referring your patient, Shelley Greenwood, to the [facility]. Please see a copy of my visit note below.     Current Eye Medications:  None.      Subjective:  Patient here for pre-op cataract surgery, left eye, scheduled for 04-01-19.  History and Physical is done.  She is allergic to Prednisone.  She has not replaced the right lens, she will wait until after her left eye is done.       Objective:  See Ophthalmology Exam.       Assessment:  Shelley Greenwood is a 63 year old female who presents with:   Encounter Diagnoses   Name Primary?     Combined form of age-related cataract, left eye Pre-op left eye. GEA, no prednisolone due to allergy, dil 8. Target to match myopia of right eye.      Multiple sclerosis (H)      Glaucoma suspect of both eyes        Plan:  PRE-OP CATARACT INSTRUCTIONS    ** 2 eye drops from the pharmacy at least 3 days before surgery.  *Use the following drops in the left eye 4 times on the day before surgery and once the morning of surgery:                                 Vigamox or Ofloxacin (tan cap)   Ketorolac (gray cap)    *Please bring all your eyedrops to the surgery center.  *If taking more than one drop, wait five minutes between drops.  *No solid food or drink after midnight. Please take the starred medications (see attached list) with a small sip of water the morning of surgery.  *If you are diabetic, please do not take any diabetic medications the morning of surgery.    Cintia Orosco MD  [phone]      Again, thank you for allowing me to participate in the care of your patient.        Sincerely,        Cintia Orosco MD

## 2019-04-01 ENCOUNTER — ANESTHESIA (OUTPATIENT)
Dept: SURGERY | Facility: CLINIC | Age: 64
End: 2019-04-01
Payer: MEDICARE

## 2019-04-01 ENCOUNTER — NURSE TRIAGE (OUTPATIENT)
Dept: NURSING | Facility: CLINIC | Age: 64
End: 2019-04-01

## 2019-04-01 ENCOUNTER — ANESTHESIA EVENT (OUTPATIENT)
Dept: SURGERY | Facility: CLINIC | Age: 64
End: 2019-04-01
Payer: MEDICARE

## 2019-04-01 ENCOUNTER — HOSPITAL ENCOUNTER (OUTPATIENT)
Facility: CLINIC | Age: 64
Discharge: HOME | End: 2019-04-01
Attending: STUDENT IN AN ORGANIZED HEALTH CARE EDUCATION/TRAINING PROGRAM | Admitting: STUDENT IN AN ORGANIZED HEALTH CARE EDUCATION/TRAINING PROGRAM
Payer: MEDICARE

## 2019-04-01 VITALS
DIASTOLIC BLOOD PRESSURE: 76 MMHG | RESPIRATION RATE: 18 BRPM | HEART RATE: 81 BPM | SYSTOLIC BLOOD PRESSURE: 116 MMHG | TEMPERATURE: 97.8 F | OXYGEN SATURATION: 98 %

## 2019-04-01 PROCEDURE — 25800030 ZZH RX IP 258 OP 636: Performed by: NURSE ANESTHETIST, CERTIFIED REGISTERED

## 2019-04-01 PROCEDURE — 25000128 H RX IP 250 OP 636: Performed by: STUDENT IN AN ORGANIZED HEALTH CARE EDUCATION/TRAINING PROGRAM

## 2019-04-01 PROCEDURE — 71000028 ZZH EYE RECOVERY PHASE 2 EACH 15 MINS: Performed by: STUDENT IN AN ORGANIZED HEALTH CARE EDUCATION/TRAINING PROGRAM

## 2019-04-01 PROCEDURE — 71000004 ZZH RECOVERY EYE PHASE 1 LEVEL 1 FIRST HR: Performed by: STUDENT IN AN ORGANIZED HEALTH CARE EDUCATION/TRAINING PROGRAM

## 2019-04-01 PROCEDURE — 36000101 ZZH EYE SURGERY LEVEL 3 1ST 30 MIN: Performed by: STUDENT IN AN ORGANIZED HEALTH CARE EDUCATION/TRAINING PROGRAM

## 2019-04-01 PROCEDURE — 36000102 ZZH EYE SURGERY LEVEL 3 EA 15 ADDTL MIN: Performed by: STUDENT IN AN ORGANIZED HEALTH CARE EDUCATION/TRAINING PROGRAM

## 2019-04-01 PROCEDURE — 27210794 ZZH OR GENERAL SUPPLY STERILE: Performed by: STUDENT IN AN ORGANIZED HEALTH CARE EDUCATION/TRAINING PROGRAM

## 2019-04-01 PROCEDURE — 25000566 ZZH SEVOFLURANE, EA 15 MIN: Performed by: STUDENT IN AN ORGANIZED HEALTH CARE EDUCATION/TRAINING PROGRAM

## 2019-04-01 PROCEDURE — 25000128 H RX IP 250 OP 636: Performed by: NURSE ANESTHETIST, CERTIFIED REGISTERED

## 2019-04-01 PROCEDURE — 66984 XCAPSL CTRC RMVL W/O ECP: CPT | Mod: LT | Performed by: STUDENT IN AN ORGANIZED HEALTH CARE EDUCATION/TRAINING PROGRAM

## 2019-04-01 PROCEDURE — 37000009 ZZH ANESTHESIA TECHNICAL FEE, EACH ADDTL 15 MIN: Performed by: STUDENT IN AN ORGANIZED HEALTH CARE EDUCATION/TRAINING PROGRAM

## 2019-04-01 PROCEDURE — 25000125 ZZHC RX 250: Performed by: STUDENT IN AN ORGANIZED HEALTH CARE EDUCATION/TRAINING PROGRAM

## 2019-04-01 PROCEDURE — 37000008 ZZH ANESTHESIA TECHNICAL FEE, 1ST 30 MIN: Performed by: STUDENT IN AN ORGANIZED HEALTH CARE EDUCATION/TRAINING PROGRAM

## 2019-04-01 PROCEDURE — 25000125 ZZHC RX 250: Performed by: NURSE ANESTHETIST, CERTIFIED REGISTERED

## 2019-04-01 PROCEDURE — 40000170 ZZH STATISTIC PRE-PROCEDURE ASSESSMENT II: Performed by: STUDENT IN AN ORGANIZED HEALTH CARE EDUCATION/TRAINING PROGRAM

## 2019-04-01 PROCEDURE — V2632 POST CHMBR INTRAOCULAR LENS: HCPCS | Performed by: STUDENT IN AN ORGANIZED HEALTH CARE EDUCATION/TRAINING PROGRAM

## 2019-04-01 PROCEDURE — 25800030 ZZH RX IP 258 OP 636: Performed by: ANESTHESIOLOGY

## 2019-04-01 DEVICE — EYE IMP IOL AMO PCL TECNIS ZCB00 17.5: Type: IMPLANTABLE DEVICE | Site: EYE | Status: FUNCTIONAL

## 2019-04-01 RX ORDER — TROPICAMIDE 10 MG/ML
1 SOLUTION/ DROPS OPHTHALMIC
Status: COMPLETED | OUTPATIENT
Start: 2019-04-01 | End: 2019-04-01

## 2019-04-01 RX ORDER — ONDANSETRON 4 MG/1
4 TABLET, ORALLY DISINTEGRATING ORAL EVERY 30 MIN PRN
Status: DISCONTINUED | OUTPATIENT
Start: 2019-04-01 | End: 2019-04-01 | Stop reason: HOSPADM

## 2019-04-01 RX ORDER — SODIUM CHLORIDE, SODIUM LACTATE, POTASSIUM CHLORIDE, CALCIUM CHLORIDE 600; 310; 30; 20 MG/100ML; MG/100ML; MG/100ML; MG/100ML
INJECTION, SOLUTION INTRAVENOUS CONTINUOUS
Status: DISCONTINUED | OUTPATIENT
Start: 2019-04-01 | End: 2019-04-01 | Stop reason: HOSPADM

## 2019-04-01 RX ORDER — FENTANYL CITRATE 50 UG/ML
25-50 INJECTION, SOLUTION INTRAMUSCULAR; INTRAVENOUS
Status: DISCONTINUED | OUTPATIENT
Start: 2019-04-01 | End: 2019-04-01 | Stop reason: HOSPADM

## 2019-04-01 RX ORDER — LIDOCAINE HYDROCHLORIDE 10 MG/ML
INJECTION, SOLUTION EPIDURAL; INFILTRATION; INTRACAUDAL; PERINEURAL PRN
Status: DISCONTINUED | OUTPATIENT
Start: 2019-04-01 | End: 2019-04-01 | Stop reason: HOSPADM

## 2019-04-01 RX ORDER — LIDOCAINE HYDROCHLORIDE 20 MG/ML
INJECTION, SOLUTION INFILTRATION; PERINEURAL PRN
Status: DISCONTINUED | OUTPATIENT
Start: 2019-04-01 | End: 2019-04-01

## 2019-04-01 RX ORDER — CYCLOPENTOLATE HYDROCHLORIDE 10 MG/ML
1 SOLUTION/ DROPS OPHTHALMIC
Status: COMPLETED | OUTPATIENT
Start: 2019-04-01 | End: 2019-04-01

## 2019-04-01 RX ORDER — ONDANSETRON 2 MG/ML
INJECTION INTRAMUSCULAR; INTRAVENOUS PRN
Status: DISCONTINUED | OUTPATIENT
Start: 2019-04-01 | End: 2019-04-01

## 2019-04-01 RX ORDER — PROPOFOL 10 MG/ML
INJECTION, EMULSION INTRAVENOUS PRN
Status: DISCONTINUED | OUTPATIENT
Start: 2019-04-01 | End: 2019-04-01

## 2019-04-01 RX ORDER — FENTANYL CITRATE 50 UG/ML
INJECTION, SOLUTION INTRAMUSCULAR; INTRAVENOUS PRN
Status: DISCONTINUED | OUTPATIENT
Start: 2019-04-01 | End: 2019-04-01

## 2019-04-01 RX ORDER — PROPARACAINE HYDROCHLORIDE 5 MG/ML
1 SOLUTION/ DROPS OPHTHALMIC ONCE
Status: DISCONTINUED | OUTPATIENT
Start: 2019-04-01 | End: 2019-04-01 | Stop reason: HOSPADM

## 2019-04-01 RX ORDER — BALANCED SALT SOLUTION 6.4; .75; .48; .3; 3.9; 1.7 MG/ML; MG/ML; MG/ML; MG/ML; MG/ML; MG/ML
SOLUTION OPHTHALMIC PRN
Status: DISCONTINUED | OUTPATIENT
Start: 2019-04-01 | End: 2019-04-01 | Stop reason: HOSPADM

## 2019-04-01 RX ORDER — ONDANSETRON 2 MG/ML
4 INJECTION INTRAMUSCULAR; INTRAVENOUS EVERY 30 MIN PRN
Status: DISCONTINUED | OUTPATIENT
Start: 2019-04-01 | End: 2019-04-01 | Stop reason: HOSPADM

## 2019-04-01 RX ORDER — PHENYLEPHRINE HYDROCHLORIDE 25 MG/ML
1 SOLUTION/ DROPS OPHTHALMIC
Status: COMPLETED | OUTPATIENT
Start: 2019-04-01 | End: 2019-04-01

## 2019-04-01 RX ORDER — PROPARACAINE HYDROCHLORIDE 5 MG/ML
1 SOLUTION/ DROPS OPHTHALMIC ONCE
Status: COMPLETED | OUTPATIENT
Start: 2019-04-01 | End: 2019-04-01

## 2019-04-01 RX ORDER — NALOXONE HYDROCHLORIDE 0.4 MG/ML
.1-.4 INJECTION, SOLUTION INTRAMUSCULAR; INTRAVENOUS; SUBCUTANEOUS
Status: DISCONTINUED | OUTPATIENT
Start: 2019-04-01 | End: 2019-04-01 | Stop reason: HOSPADM

## 2019-04-01 RX ORDER — LIDOCAINE 40 MG/G
CREAM TOPICAL
Status: DISCONTINUED | OUTPATIENT
Start: 2019-04-01 | End: 2019-04-01 | Stop reason: HOSPADM

## 2019-04-01 RX ORDER — MEPERIDINE HYDROCHLORIDE 25 MG/ML
12.5 INJECTION INTRAMUSCULAR; INTRAVENOUS; SUBCUTANEOUS
Status: DISCONTINUED | OUTPATIENT
Start: 2019-04-01 | End: 2019-04-01 | Stop reason: HOSPADM

## 2019-04-01 RX ADMIN — PHENYLEPHRINE HYDROCHLORIDE 1 DROP: 25 SOLUTION/ DROPS OPHTHALMIC at 09:41

## 2019-04-01 RX ADMIN — PROPARACAINE HYDROCHLORIDE 1 DROP: 5 SOLUTION/ DROPS OPHTHALMIC at 09:36

## 2019-04-01 RX ADMIN — LIDOCAINE HYDROCHLORIDE 80 MG: 20 INJECTION, SOLUTION INFILTRATION; PERINEURAL at 10:12

## 2019-04-01 RX ADMIN — TROPICAMIDE 1 DROP: 10 SOLUTION/ DROPS OPHTHALMIC at 09:43

## 2019-04-01 RX ADMIN — CYCLOPENTOLATE HYDROCHLORIDE 1 DROP: 10 SOLUTION/ DROPS OPHTHALMIC at 09:36

## 2019-04-01 RX ADMIN — PROPOFOL 150 MG: 10 INJECTION, EMULSION INTRAVENOUS at 10:12

## 2019-04-01 RX ADMIN — TROPICAMIDE 1 DROP: 10 SOLUTION/ DROPS OPHTHALMIC at 09:41

## 2019-04-01 RX ADMIN — CYCLOPENTOLATE HYDROCHLORIDE 1 DROP: 10 SOLUTION/ DROPS OPHTHALMIC at 09:43

## 2019-04-01 RX ADMIN — DEXMEDETOMIDINE HYDROCHLORIDE 12 MCG: 100 INJECTION, SOLUTION INTRAVENOUS at 10:34

## 2019-04-01 RX ADMIN — PHENYLEPHRINE HYDROCHLORIDE 1 DROP: 25 SOLUTION/ DROPS OPHTHALMIC at 09:36

## 2019-04-01 RX ADMIN — FENTANYL CITRATE 25 MCG: 50 INJECTION, SOLUTION INTRAMUSCULAR; INTRAVENOUS at 10:22

## 2019-04-01 RX ADMIN — DEXMEDETOMIDINE HYDROCHLORIDE 8 MCG: 100 INJECTION, SOLUTION INTRAVENOUS at 10:12

## 2019-04-01 RX ADMIN — PHENYLEPHRINE HYDROCHLORIDE 1 DROP: 25 SOLUTION/ DROPS OPHTHALMIC at 09:43

## 2019-04-01 RX ADMIN — TROPICAMIDE 1 DROP: 10 SOLUTION/ DROPS OPHTHALMIC at 09:36

## 2019-04-01 RX ADMIN — MIDAZOLAM 2 MG: 1 INJECTION INTRAMUSCULAR; INTRAVENOUS at 10:08

## 2019-04-01 RX ADMIN — FENTANYL CITRATE 25 MCG: 50 INJECTION, SOLUTION INTRAMUSCULAR; INTRAVENOUS at 10:12

## 2019-04-01 RX ADMIN — CYCLOPENTOLATE HYDROCHLORIDE 1 DROP: 10 SOLUTION/ DROPS OPHTHALMIC at 09:41

## 2019-04-01 RX ADMIN — SODIUM CHLORIDE, POTASSIUM CHLORIDE, SODIUM LACTATE AND CALCIUM CHLORIDE: 600; 310; 30; 20 INJECTION, SOLUTION INTRAVENOUS at 09:45

## 2019-04-01 RX ADMIN — ONDANSETRON 4 MG: 2 INJECTION INTRAMUSCULAR; INTRAVENOUS at 10:16

## 2019-04-01 NOTE — ANESTHESIA PREPROCEDURE EVALUATION
Anesthesia Pre-Procedure Evaluation    Patient: Shelley Greenwood   MRN: [id number] : 1955          Preoperative Diagnosis: CATARACT    Procedure(s):  LEFT EYE PHACOEMULSIFICATION CLEAR CORNEA WITH STANDARD INTRAOCULAR LENS IMPLANT(GENERAL)    Past Medical History:   Diagnosis Date     Bipolar 1 disorder (H)      Bipolar disorder (H)      Cerebral artery occlusion with cerebral infarction (H)      Cognitive dysfunction      Depressive disorder      Gastroesophageal reflux disease      Hypertension      Multiple scleroses      Multiple sclerosis (H)      Neuromuscular disorder (H)     ms 30 years     Nonsenile cataract      Obese      Past Surgical History:   Procedure Laterality Date     GALLBLADDER SURGERY       GYN SURGERY      hysterectomy     ORTHOPEDIC SURGERY      right thumb     PHACOEMULSIFICATION CLEAR CORNEA WITH STANDARD INTRAOCULAR LENS IMPLANT Right 10/22/2018    Procedure: RIGHT EYE PHACOEMULSIFICATION CLEAR CORNEA WITH STANDARD INTRAOCULAR LENS IMPLANT  ;  Surgeon: Cintia Orosco MD;  Location: [facility]       Anesthesia Evaluation     . Pt has had prior anesthetic.     No history of anesthetic complications          ROS/MED HX    ENT/Pulmonary:     (+)sleep apnea (In pass, improved with weight loss), TASHI risk factors obese, , . .    Neurologic: Comment: Cognitive dysfunction    (+)CVA Multiple Sclerosis     Cardiovascular:     (+) hypertension----. : . . . :. .       METS/Exercise Tolerance:     Hematologic:         Musculoskeletal: Comment: Chronic right shoulder pain  osteoporosis        GI/Hepatic:     (+) GERD       Renal/Genitourinary: Comment: Hx of pyelonephritis with sepsis 2018    UTI 3/19/2019 - treated with 5 day course of antibiotics - ROS Renal section negative       Endo:     (+) Obesity, .   (-) Type II DM and thyroid disease   Psychiatric: Comment: Insomnia  paranoia    (+) psychiatric history bipolar, anxiety and other (comment) (anxiety, psychosis/delusions per psych note,  "organic brain syndrome)      Infectious Disease:         Malignancy:         Other: Comment: glaucoma                         Physical Exam  Normal systems: pulmonary    Airway   Mallampati: III  TM distance: >3 FB  Neck ROM: full    Dental   (+) partials    Cardiovascular   Rhythm and rate: regular and normal      Pulmonary    breath sounds clear to auscultation            Lab Results   Component Value Date    WBC 11.5 (H) 03/02/2015    HGB 12.5 03/02/2015    HCT 37.7 03/02/2015     03/02/2015    CRP 46.4 (H) 12/25/2014     03/05/2015    POTASSIUM 3.8 03/05/2015    CHLORIDE 108 03/05/2015    CO2 25 03/05/2015    BUN 18 03/05/2015    CR 0.74 03/05/2015    GLC 92 03/05/2015    KILLIAN 9.4 03/05/2015    MAG 2.6 (H) 12/31/2014    ALBUMIN 3.6 03/02/2015    PROTTOTAL 7.3 03/02/2015    ALT 14 03/02/2015    AST 9 03/02/2015    ALKPHOS 75 03/02/2015    BILITOTAL 0.3 03/02/2015    JENNIFER 49 01/05/2015    INR 0.92 06/05/2010    TSH 1.03 03/02/2015    T4 0.83 11/08/2005       Preop Vitals  BP Readings from Last 3 Encounters:   10/22/18 152/90   03/03/15 146/77   01/04/15 127/78    Pulse Readings from Last 3 Encounters:   03/03/15 79   01/04/15 93   04/05/13 96      Resp Readings from Last 3 Encounters:   10/22/18 11   03/05/15 16   01/05/15 16    SpO2 Readings from Last 3 Encounters:   10/22/18 96%   03/02/15 99%   12/25/14 98%      Temp Readings from Last 1 Encounters:   10/22/18 36.6  C (97.8  F) (Temporal)    Ht Readings from Last 1 Encounters:   10/22/18 0.56 m (1' 10.05\")      Wt Readings from Last 1 Encounters:   10/22/18 108.9 kg (240 lb)    Estimated body mass index is 347.15 kg/m  as calculated from the following:    Height as of 10/22/18: 0.56 m (1' 10.05\").    Weight as of 10/22/18: 108.9 kg (240 lb).       Anesthesia Plan      History & Physical Review  History and physical reviewed and following examination; no interval change.    ASA Status:  3 .    NPO Status:  > 8 hours    Plan for General and LMA "   PONV prophylaxis:  Ondansetron (or other 5HT-3)  S/p cataract surgery 10/2018 with LMA    Hard time laying flat while awake      Postoperative Care  Postoperative pain management:  IV analgesics.      Consents  Anesthetic plan, risks, benefits and alternatives discussed with:  Patient..                 Gordon Garnica MD

## 2019-04-01 NOTE — ANESTHESIA CARE TRANSFER NOTE
Patient: Shelley Greenwood    Procedure(s):  LEFT EYE PHACOEMULSIFICATION CLEAR CORNEA WITH STANDARD INTRAOCULAR LENS IMPLANT    Diagnosis: CATARACT  Diagnosis Additional Information: No value filed.    Anesthesia Type:   General, LMA     Note:  Airway :Face Mask  Patient transferred to:PACU  Comments: At end of procedure, spontaneous respirations, adequate tidal volumes, LMA removed atraumatically, oropharynx suctioned, airway patent after LMA removal. Oxygen via facemask at 10 liters per minute to PACU. Oxygen tubing connected to wall O2 in PACU, SpO2, NiBP, and EKG monitors and alarms on and functioning, report on patient's clinical status given to PACU RN, RN questions answered.Handoff Report: Identifed the Patient, Identified the Reponsible Provider, Reviewed the pertinent medical history, Discussed the surgical course, Reviewed Intra-OP anesthesia mangement and issues during anesthesia, Set expectations for post-procedure period and Allowed opportunity for questions and acknowledgement of understanding      Vitals: (Last set prior to Anesthesia Care Transfer)    CRNA VITALS  4/1/2019 1019 - 4/1/2019 1059      4/1/2019             Resp Rate (set):  10                Electronically Signed By: MAK Schaefer CRNA  April 1, 2019  10:59 AM

## 2019-04-01 NOTE — TELEPHONE ENCOUNTER
Caller states she forgot she was having lasik eye surgery at 10a on 04/02/19. Caller states she ate half of a 2 inch cheese stick. Triager advised caller to inform surgical team in the am, but she will probably be ok for surgery. Triager advised caller to not eat or drink anything else. If need be only take sips of water. Caller verbalized and understands directives.    Reason for Disposition    [1] Caller requesting NON-URGENT health information AND [2] PCP's office is the best resource    Additional Information    Negative: [1] Caller is not with the adult (patient) AND [2] reporting urgent symptoms    Negative: Lab result questions    Negative: Medication questions    Negative: Caller cannot be reached by phone    Negative: Caller has already spoken to PCP or another triager    Negative: RN needs further essential information from caller in order to complete triage    Negative: Requesting regular office appointment    Protocols used: [facility]

## 2019-04-01 NOTE — OP NOTE
PreOp Diagnosis: Visually significant nuclear sclerotic cataract left eye  PostOp Diagnosis: Same  Surgeon: Cintia Orosco MD  Implant: Technis [id number]     Procedures:   1. Review of intraocular lens calculations, both eyes   2. Phacoemulsification and extraction of lens  left eye   3. Intraocular lens implantation left eye  Anesthesia: MAC/topical  Complications: None  EBL: <1cc    Shelley S Stack suffers from a visually significant cataract of the left eye. This has caused problems with distance and reading vision, including glare. After discussing the risks, benefits, and alternatives, the patient wishes to proceed with cataract surgery.    The patient was identified in the pre-op area where the left eye was marked. The patient was then brought to the operating room where a time out was called, identifying the patient, the procedure, and the correct site. Tetracaine drops were applied to both eyes. The operative eye was then prepped and draped in the usual sterile ophthalmic fashion. An eyelid speculum was placed into the operative eye. Additional tetracaine drops were applied. A paracentesis was made inferior with a side port blade. 1% preservative-free lidocaine and epinephrine was injected into the anterior chamber.  Viscoat was injected to deepen the anterior chamber. A 2.4mm clear corneal wound was created with a keratome blade temporally.  A continuous curvilinear capsulorrhexis was started with a bent cystitome and completed with Utrada forceps. Hydrodissection of the lens nucleus was performed with BSS on a cannula. The lens nucleus was rotated. Phacoemulsification of the lens nucleus was accomplished in a phacoemulsification stop and chop technique. Remaining cortex was removed with irrigation and aspiration. The lens capsule was noted to be intact. Provisc was used to inflate the capsular bag.  The lens was injected into the capsular bag. Remaining viscoelastic was removed with irrigation and  aspiration. The wounds were checked and found to be watertight after hydration. The eyelid speculum was removed and Vigamox drops were placed into the operative eye. The patient tolerated the procedure well and was in stable condition on the way to the recovery area.     Cintia Orosco MD

## 2019-04-01 NOTE — OR NURSING
Report called to Aundrea at [facility].  Patient tolerating ice chips, anxious to go home. Patient sitting in wheelchair, talkative and cooperative.

## 2019-04-01 NOTE — DISCHARGE INSTRUCTIONS
CATARACT SURGERY POST-OP INSTRUCTIONS  Dr. Cintia Orosco  [phone]        Continue using eye drops today, including   - Vigamox (tan top)   - Ketolorac (grey top)    You should get 3 doses in today and 4 doses daily starting tomorrow.  Wait a few minutes in between putting each drop in.      Keep the eye shield taped in place unless putting drops in. We will remove it for you in the office tomorrow.      Light sensitivity may be noticed. Sunglasses may be worn for comfort.      Do not rub the operated eye.      Keep the operated eye dry. You may wash your hair, bathe or shower, but keep the operated eye closed while doing so.       No swimming, hot tub, or sauna for 2 weeks.      No make up around eye for 5 days.      No bending at the waist or lifting more than 10 pounds for one week.      May take Tylenol (per directions on bottle) for mild pain.      Call the office at [phone] and ask to speak to the on-call ophthalmologist  if any of the following should occur:  o Any sudden vision changes  o Nausea or severe headache  o Increase in pain not controlled  o Or signs of infection (pus, increasing redness or tenderness)    [facility] Anesthesia [facility] Discharge  Instructions  Anesthesia (Eye Care Center)   Adult Discharge Instructions (General)    For 24 hours after surgery    1. Get plenty of rest.  Make arrangements to have a responsible adult stay with you for at least 24 hours.  2. Do not drive or use heavy equipment for 24 hours.    3. Do not drink alcohol for 24 hours.  4. Do not sign legal documents or make important decisions for 24 hours.  5. Avoid strenuous or risky activities. You may feel lightheaded.  If so, sit for a few minutes before standing.  Have someone help you get up.   6. General anesthesia patients should drink only clear liquids (apple juice, ginger ale, broth or 7-Up). Be sure to drink enough fluids.  Move to a regular diet as you feel able.  7. Any questions of  medical nature, call your physician.

## 2019-04-01 NOTE — ANESTHESIA POSTPROCEDURE EVALUATION
Patient: Shelley Greenwood    Procedure(s):  LEFT EYE PHACOEMULSIFICATION CLEAR CORNEA WITH STANDARD INTRAOCULAR LENS IMPLANT    Diagnosis:CATARACT  Diagnosis Additional Information: No value filed.    Anesthesia Type:  General, LMA    Note:  Anesthesia Post Evaluation    Patient location during evaluation: PACU  Patient participation: Able to fully participate in evaluation  Level of consciousness: awake  Pain management: adequate  Airway patency: patent  Cardiovascular status: acceptable  Respiratory status: acceptable  Hydration status: acceptable  PONV: none     Anesthetic complications: None          Last vitals:  Vitals:    04/01/19 1121 04/01/19 1129 04/01/19 1145   BP: 139/90 113/87 116/76   Pulse: 81     Resp: 18     Temp:      SpO2:  98%          Electronically Signed By: Gordon Garnica MD  April 1, 2019  5:22 PM

## 2019-04-02 ENCOUNTER — OFFICE VISIT (OUTPATIENT)
Dept: OPHTHALMOLOGY | Facility: CLINIC | Age: 64
End: 2019-04-02
Payer: MEDICARE

## 2019-04-02 DIAGNOSIS — G35 MULTIPLE SCLEROSIS (H): ICD-10-CM

## 2019-04-02 DIAGNOSIS — H40.003 GLAUCOMA SUSPECT OF BOTH EYES: ICD-10-CM

## 2019-04-02 DIAGNOSIS — Z96.1 PSEUDOPHAKIA OF BOTH EYES: Primary | ICD-10-CM

## 2019-04-02 PROCEDURE — 99024 POSTOP FOLLOW-UP VISIT: CPT | Performed by: STUDENT IN AN ORGANIZED HEALTH CARE EDUCATION/TRAINING PROGRAM

## 2019-04-02 ASSESSMENT — EXTERNAL EXAM - LEFT EYE: OS_EXAM: NORMAL

## 2019-04-02 ASSESSMENT — VISUAL ACUITY
OD_PH_SC: 20/40
OD_SC: 20/100
OS_SC+: -2
OS_SC: 20/25
METHOD: SNELLEN - LINEAR
OD_PH_SC+: -1

## 2019-04-02 ASSESSMENT — TONOMETRY
IOP_METHOD: APPLANATION
OS_IOP_MMHG: 14

## 2019-04-02 ASSESSMENT — SLIT LAMP EXAM - LIDS
COMMENTS: NORMAL
COMMENTS: NORMAL

## 2019-04-02 ASSESSMENT — EXTERNAL EXAM - RIGHT EYE: OD_EXAM: NORMAL

## 2019-04-02 NOTE — PROGRESS NOTES
Current Eye Medications:  Vigamox and Acular Four times a day left eye.     Subjective: here for PO1 left eye. No pain, seeing better than she expected, much brighter.     Objective:  See Ophthalmology Exam.      Assessment:  Shelley Greenwood is a 63 year old female who presents with:   Encounter Diagnoses   Name Primary?     Pseudophakia of both eyes PO1 left eye, doing well.      Multiple sclerosis (H)      Glaucoma suspect of both eyes        Plan:  POST-OP CATARACT INSTRUCTIONS    *   Use the following drop(s) in the LEFT EYE four times a day:        Vigamox (tan top), and Ketorolac (grey top)    *   If you are taking glaucoma drops, continue as usual.  *   Wear eye shield when sleeping for one week.  *   No eye rubbing or lifting more than 10 pounds for one week.  *   Keep water out of eye for two weeks.  *   OK to resume aspirin and/or other blood thinners.  *   Return as scheduled in about one week.  *   If your vision worsens, eye becomes increasingly red, or becomes painful, call [phone].     Cintia Orosco M.D.

## 2019-04-02 NOTE — LETTER
4/2/2019       RE: Shelley Greenwood  [address] [place] [address]      Dear Dr. Pedroza,    Thank you for referring your patient, Shelley Greenwood, to the [facility].     She is doing well after cataract surgery in the left eye yesterday. Please see a copy of my visit note below.     Current Eye Medications:  Vigamox and Acular Four times a day left eye.     Subjective: here for PO1 left eye. No pain, seeing better than she expected, much brighter.     Objective:  See Ophthalmology Exam.      Assessment:  Shelley Greenwood is a 63 year old female who presents with:   Encounter Diagnoses   Name Primary?     Pseudophakia of both eyes PO1 left eye, doing well.      Multiple sclerosis (H)      Glaucoma suspect of both eyes        Plan:  POST-OP CATARACT INSTRUCTIONS    *   Use the following drop(s) in the LEFT EYE four times a day:        Vigamox (tan top), and Ketorolac (grey top)    *   If you are taking glaucoma drops, continue as usual.  *   Wear eye shield when sleeping for one week.  *   No eye rubbing or lifting more than 10 pounds for one week.  *   Keep water out of eye for two weeks.  *   OK to resume aspirin and/or other blood thinners.  *   Return as scheduled in about one week.  *   If your vision worsens, eye becomes increasingly red, or becomes painful, call [phone].     Cintia Orosco M.D.      Again, thank you for allowing me to participate in the care of your patient.        Sincerely,        Cintia Orosco MD

## 2019-04-02 NOTE — PATIENT INSTRUCTIONS
POST-OP CATARACT INSTRUCTIONS    *   Use the following drop(s) in the LEFT EYE four times a day:        Vigamox (tan top), and Ketorolac (grey top)    *   If you are taking glaucoma drops, continue as usual.    *   Wear eye shield when sleeping for one week.    *   No eye rubbing or lifting more than 10 pounds for one week.    *   Keep water out of eye for two weeks.    *   OK to resume aspirin and/or other blood thinners.    *   Return as scheduled in about one week.    *   If your vision worsens, eye becomes increasingly red, or becomes painful, call [phone].     Cintia Orosco M.D.

## 2019-04-08 ENCOUNTER — OFFICE VISIT (OUTPATIENT)
Dept: OPHTHALMOLOGY | Facility: CLINIC | Age: 64
End: 2019-04-08
Payer: MEDICARE

## 2019-04-08 ENCOUNTER — DOCUMENTATION ONLY (OUTPATIENT)
Dept: OPHTHALMOLOGY | Facility: CLINIC | Age: 64
End: 2019-04-08

## 2019-04-08 DIAGNOSIS — G35 MULTIPLE SCLEROSIS (H): ICD-10-CM

## 2019-04-08 DIAGNOSIS — Z96.1 PSEUDOPHAKIA OF BOTH EYES: Primary | ICD-10-CM

## 2019-04-08 DIAGNOSIS — H40.003 GLAUCOMA SUSPECT OF BOTH EYES: ICD-10-CM

## 2019-04-08 PROCEDURE — 99024 POSTOP FOLLOW-UP VISIT: CPT | Performed by: STUDENT IN AN ORGANIZED HEALTH CARE EDUCATION/TRAINING PROGRAM

## 2019-04-08 ASSESSMENT — VISUAL ACUITY
OS_PH_SC: 20/30
OS_SC: 20/40
OS_SC+: -1
METHOD: SNELLEN - LINEAR

## 2019-04-08 ASSESSMENT — REFRACTION_MANIFEST
OS_CYLINDER: +0.75
OS_AXIS: 142
OS_SPHERE: -1.00

## 2019-04-08 ASSESSMENT — TONOMETRY
IOP_METHOD: ICARE
OS_IOP_MMHG: 09

## 2019-04-08 ASSESSMENT — EXTERNAL EXAM - LEFT EYE: OS_EXAM: NORMAL

## 2019-04-08 ASSESSMENT — EXTERNAL EXAM - RIGHT EYE: OD_EXAM: NORMAL

## 2019-04-08 ASSESSMENT — SLIT LAMP EXAM - LIDS
COMMENTS: NORMAL
COMMENTS: NORMAL

## 2019-04-08 NOTE — PATIENT INSTRUCTIONS
*   For the left eye, continue Vigamox or ofloxacin (tan top) and Ketorolac (grey top) four times a day until the bottles run out.    *   Stop wearing eye shield.    *   No eye rubbing. May resume heavy lifting.    *   If you are taking glaucoma drops, continue as usual.    *   Return one month for final exam with glasses check.    *   If your vision worsens, eye becomes increasingly red, or becomes painful, call [phone].    Cintia Orosco M.D.

## 2019-04-08 NOTE — PROGRESS NOTES
Current Eye Medications:  Vigamox and ketorolac left eye four times a day.       Subjective:  Status/Post Kelman Phacoemulsification with Posterior Chamber Lens left eye:  4-1-19.  Vision is improving.  Left eye is comfortable, but ketorolac stings.     Objective:  See Ophthalmology Exam.       Assessment:  Shelley Greenwood is a 63 year old female who presents with:   Encounter Diagnoses   Name Primary?     Pseudophakia of both eyes PO2 left eye, doing well.      Multiple sclerosis (H)      Glaucoma suspect of both eyes      Plan:  *   For the left eye, continue Vigamox or ofloxacin (tan top) and Ketorolac (grey top) four times a day until the bottles run out.    *   Stop wearing eye shield.  *   No eye rubbing. May resume heavy lifting.  *   If you are taking glaucoma drops, continue as usual.  *   Return one month for final exam with glasses check.  *   If your vision worsens, eye becomes increasingly red, or becomes painful, call [phone].    Cintia Orosco M.D.

## 2019-04-08 NOTE — LETTER
4/8/2019       RE: Shelley Greenwood  [address] [place] [address]      Dear Dr. Pedroza,    Thank you for referring your patient, Shelley Greenwood, to the [facility].     She is doing well after cataract surgery in the left eye last week.  Please see a copy of my visit note below.     Current Eye Medications:  Vigamox and ketorolac left eye four times a day.       Subjective:  Status/Post Kelman Phacoemulsification with Posterior Chamber Lens left eye:  4-1-19.  Vision is improving.  Left eye is comfortable, but ketorolac stings.     Objective:  See Ophthalmology Exam.       Assessment:  Shelley Greenwood is a 63 year old female who presents with:   Encounter Diagnoses   Name Primary?     Pseudophakia of both eyes PO2 left eye, doing well.      Multiple sclerosis (H)      Glaucoma suspect of both eyes      Plan:  *   For the left eye, continue Vigamox or ofloxacin (tan top) and Ketorolac (grey top) four times a day until the bottles run out.    *   Stop wearing eye shield.  *   No eye rubbing. May resume heavy lifting.  *   If you are taking glaucoma drops, continue as usual.  *   Return one month for final exam with glasses check.  *   If your vision worsens, eye becomes increasingly red, or becomes painful, call [phone].    Cintia Orosco M.D.      Again, thank you for allowing me to participate in the care of your patient.        Sincerely,        Cintia Orosco MD

## 2019-05-07 ENCOUNTER — RECORDS - HEALTHEAST (OUTPATIENT)
Dept: LAB | Facility: CLINIC | Age: 64
End: 2019-05-07

## 2019-05-07 LAB
ALBUMIN UR-MCNC: NEGATIVE MG/DL
APPEARANCE UR: CLEAR
BACTERIA #/AREA URNS HPF: ABNORMAL HPF
BILIRUB UR QL STRIP: NEGATIVE
COLOR UR AUTO: COLORLESS
GLUCOSE UR STRIP-MCNC: NEGATIVE MG/DL
HGB UR QL STRIP: NEGATIVE
KETONES UR STRIP-MCNC: NEGATIVE MG/DL
LEUKOCYTE ESTERASE UR QL STRIP: ABNORMAL
NITRATE UR QL: NEGATIVE
PH UR STRIP: 5.5 [PH] (ref 4.5–8)
RBC #/AREA URNS AUTO: ABNORMAL HPF
SP GR UR STRIP: 1 (ref 1–1.03)
SQUAMOUS #/AREA URNS AUTO: ABNORMAL LPF
UROBILINOGEN UR STRIP-ACNC: ABNORMAL
WBC #/AREA URNS AUTO: ABNORMAL HPF

## 2019-05-10 LAB — BACTERIA SPEC CULT: ABNORMAL

## 2019-05-22 ENCOUNTER — APPOINTMENT (OUTPATIENT)
Dept: OPTOMETRY | Facility: CLINIC | Age: 64
End: 2019-05-22
Payer: MEDICARE

## 2019-05-22 ENCOUNTER — OFFICE VISIT (OUTPATIENT)
Dept: OPHTHALMOLOGY | Facility: CLINIC | Age: 64
End: 2019-05-22
Payer: MEDICARE

## 2019-05-22 DIAGNOSIS — H35.371 EPIRETINAL MEMBRANE, RIGHT EYE: ICD-10-CM

## 2019-05-22 DIAGNOSIS — Z96.1 PSEUDOPHAKIA OF BOTH EYES: Primary | ICD-10-CM

## 2019-05-22 DIAGNOSIS — G35 MULTIPLE SCLEROSIS (H): ICD-10-CM

## 2019-05-22 DIAGNOSIS — H40.003 GLAUCOMA SUSPECT OF BOTH EYES: ICD-10-CM

## 2019-05-22 PROCEDURE — 92341 FIT SPECTACLES BIFOCAL: CPT | Performed by: STUDENT IN AN ORGANIZED HEALTH CARE EDUCATION/TRAINING PROGRAM

## 2019-05-22 PROCEDURE — 99024 POSTOP FOLLOW-UP VISIT: CPT | Performed by: STUDENT IN AN ORGANIZED HEALTH CARE EDUCATION/TRAINING PROGRAM

## 2019-05-22 ASSESSMENT — REFRACTION_WEARINGRX
OS_SPHERE: -2.50
OS_CYLINDER: +0.25
OD_AXIS: 060
OD_ADD: +2.50
SPECS_TYPE: BIFOCAL
OS_ADD: +2.50
OS_AXIS: 076
OD_SPHERE: -4.50
OD_CYLINDER: +1.25

## 2019-05-22 ASSESSMENT — VISUAL ACUITY
OS_CC: 20/50
CORRECTION_TYPE: GLASSES
OS_PH_CC+: -2
OS_PH_CC: 20/40
OD_CC: 20/50
OD_PH_CC: 20/40
OD_CC+: -2
METHOD: SNELLEN - LINEAR

## 2019-05-22 ASSESSMENT — REFRACTION_MANIFEST
OD_CYLINDER: +1.25
OD_ADD: +3.00
OS_SPHERE: -0.75
OS_ADD: +2.50
OD_AXIS: 059
OD_SPHERE: -3.00
OS_CYLINDER: SPHERE

## 2019-05-22 ASSESSMENT — SLIT LAMP EXAM - LIDS
COMMENTS: NORMAL
COMMENTS: NORMAL

## 2019-05-22 ASSESSMENT — EXTERNAL EXAM - RIGHT EYE: OD_EXAM: NORMAL

## 2019-05-22 ASSESSMENT — TONOMETRY
OD_IOP_MMHG: 13
OS_IOP_MMHG: 13
IOP_METHOD: ICARE

## 2019-05-22 ASSESSMENT — EXTERNAL EXAM - LEFT EYE: OS_EXAM: NORMAL

## 2019-05-22 ASSESSMENT — CUP TO DISC RATIO
OS_RATIO: 0.3
OD_RATIO: 0.65

## 2019-05-22 NOTE — PROGRESS NOTES
Current Eye Medications:  None now, finished drops.     Subjective:  Final MR, KPE/IOL left eye:  4-1-19. Vision is improved left eye. No eye pain or discomfort in either eye.      Objective:  See Ophthalmology Exam.       Assessment:  Shelley Greenwood is a 63 year old female who presents with:   Encounter Diagnoses   Name Primary?     Pseudophakia of both eyes Final MR left eye, doing well.     Multiple sclerosis (H)      Glaucoma suspect of both eyes Intraocular pressure 13/13, discs stable. Monitor.     Epiretinal membrane, right eye        Plan:  Glasses prescription given     Cintia Orosco MD  [phone]

## 2019-05-22 NOTE — LETTER
5/22/2019         RE: Shelley Greenwood  [address] [place] [address]        Dear Colleague,    Thank you for referring your patient, Shelley Greenwood, to the [facility]. Please see a copy of my visit note below.     Current Eye Medications:  None now, finished drops.     Subjective:  Final MR, KPE/IOL left eye:  4-1-19. Vision is improved left eye. No eye pain or discomfort in either eye.      Objective:  See Ophthalmology Exam.       Assessment:  Shelley Greenwood is a 63 year old female who presents with:   Encounter Diagnoses   Name Primary?     Pseudophakia of both eyes Final MR left eye, doing well.     Multiple sclerosis (H)      Glaucoma suspect of both eyes Intraocular pressure 13/13, discs stable. Monitor.     Epiretinal membrane, right eye        Plan:  Glasses prescription given     Cintia Orosco MD  [phone]        Again, thank you for allowing me to participate in the care of your patient.        Sincerely,        Cintia Orosco MD

## 2019-05-23 ENCOUNTER — RECORDS - HEALTHEAST (OUTPATIENT)
Dept: LAB | Facility: CLINIC | Age: 64
End: 2019-05-23

## 2019-05-23 LAB
ALBUMIN UR-MCNC: NEGATIVE MG/DL
APPEARANCE UR: ABNORMAL
BACTERIA #/AREA URNS HPF: ABNORMAL HPF
BILIRUB UR QL STRIP: NEGATIVE
COLOR UR AUTO: ABNORMAL
GLUCOSE UR STRIP-MCNC: NEGATIVE MG/DL
HGB UR QL STRIP: ABNORMAL
KETONES UR STRIP-MCNC: NEGATIVE MG/DL
LEUKOCYTE ESTERASE UR QL STRIP: ABNORMAL
MUCOUS THREADS #/AREA URNS LPF: ABNORMAL LPF
NITRATE UR QL: NEGATIVE
PH UR STRIP: 6 [PH] (ref 4.5–8)
RBC #/AREA URNS AUTO: ABNORMAL HPF
SP GR UR STRIP: 1.01 (ref 1–1.03)
SQUAMOUS #/AREA URNS AUTO: ABNORMAL LPF
UROBILINOGEN UR STRIP-ACNC: ABNORMAL
WBC #/AREA URNS AUTO: ABNORMAL HPF
WBC CLUMPS #/AREA URNS HPF: PRESENT /[HPF]

## 2019-05-25 LAB — BACTERIA SPEC CULT: ABNORMAL

## 2019-06-05 ENCOUNTER — RECORDS - HEALTHEAST (OUTPATIENT)
Dept: LAB | Facility: CLINIC | Age: 64
End: 2019-06-05

## 2019-06-05 LAB
ALBUMIN UR-MCNC: NEGATIVE MG/DL
APPEARANCE UR: CLEAR
BACTERIA #/AREA URNS HPF: ABNORMAL HPF
BILIRUB UR QL STRIP: NEGATIVE
COLOR UR AUTO: ABNORMAL
GLUCOSE UR STRIP-MCNC: NEGATIVE MG/DL
HGB UR QL STRIP: ABNORMAL
KETONES UR STRIP-MCNC: NEGATIVE MG/DL
LEUKOCYTE ESTERASE UR QL STRIP: ABNORMAL
MUCOUS THREADS #/AREA URNS LPF: ABNORMAL LPF
NITRATE UR QL: NEGATIVE
PH UR STRIP: 5.5 [PH] (ref 4.5–8)
RBC #/AREA URNS AUTO: ABNORMAL HPF
SP GR UR STRIP: 1.01 (ref 1–1.03)
SQUAMOUS #/AREA URNS AUTO: ABNORMAL LPF
UROBILINOGEN UR STRIP-ACNC: ABNORMAL
WBC #/AREA URNS AUTO: ABNORMAL HPF

## 2019-06-08 LAB
BACTERIA SPEC CULT: ABNORMAL
BACTERIA SPEC CULT: ABNORMAL

## 2019-06-20 ENCOUNTER — RECORDS - HEALTHEAST (OUTPATIENT)
Dept: LAB | Facility: CLINIC | Age: 64
End: 2019-06-20

## 2019-06-20 LAB
ALBUMIN SERPL-MCNC: 3.5 G/DL (ref 3.5–5)
ALP SERPL-CCNC: 105 U/L (ref 45–120)
ALT SERPL W P-5'-P-CCNC: 25 U/L (ref 0–45)
ANION GAP SERPL CALCULATED.3IONS-SCNC: 8 MMOL/L (ref 5–18)
AST SERPL W P-5'-P-CCNC: 21 U/L (ref 0–40)
BILIRUB DIRECT SERPL-MCNC: <0.1 MG/DL
BILIRUB SERPL-MCNC: 0.2 MG/DL (ref 0–1)
BUN SERPL-MCNC: 16 MG/DL (ref 8–22)
C DIFF TOX B STL QL: NEGATIVE
CALCIUM SERPL-MCNC: 10.7 MG/DL (ref 8.5–10.5)
CHLORIDE BLD-SCNC: 105 MMOL/L (ref 98–107)
CO2 SERPL-SCNC: 24 MMOL/L (ref 22–31)
CREAT SERPL-MCNC: 0.66 MG/DL (ref 0.6–1.1)
ERYTHROCYTE [DISTWIDTH] IN BLOOD BY AUTOMATED COUNT: 13.9 % (ref 11–14.5)
GFR SERPL CREATININE-BSD FRML MDRD: >60 ML/MIN/1.73M2
GLUCOSE BLD-MCNC: 96 MG/DL (ref 70–125)
HCT VFR BLD AUTO: 36.7 % (ref 35–47)
HGB BLD-MCNC: 11.5 G/DL (ref 12–16)
LIPASE SERPL-CCNC: 13 U/L (ref 0–52)
MCH RBC QN AUTO: 27.4 PG (ref 27–34)
MCHC RBC AUTO-ENTMCNC: 31.3 G/DL (ref 32–36)
MCV RBC AUTO: 88 FL (ref 80–100)
PLATELET # BLD AUTO: 304 THOU/UL (ref 140–440)
PMV BLD AUTO: 9.7 FL (ref 8.5–12.5)
POTASSIUM BLD-SCNC: 4.2 MMOL/L (ref 3.5–5)
PROT SERPL-MCNC: 7.2 G/DL (ref 6–8)
RBC # BLD AUTO: 4.19 MILL/UL (ref 3.8–5.4)
RIBOTYPE 027/NAP1/BI: NORMAL
SODIUM SERPL-SCNC: 137 MMOL/L (ref 136–145)
WBC: 8.4 THOU/UL (ref 4–11)

## 2019-07-24 ENCOUNTER — RECORDS - HEALTHEAST (OUTPATIENT)
Dept: LAB | Facility: CLINIC | Age: 64
End: 2019-07-24

## 2019-07-24 LAB
ALBUMIN UR-MCNC: ABNORMAL MG/DL
AMORPH CRY #/AREA URNS HPF: ABNORMAL /[HPF]
APPEARANCE UR: ABNORMAL
BACTERIA #/AREA URNS HPF: ABNORMAL HPF
BILIRUB UR QL STRIP: NEGATIVE
COLOR UR AUTO: YELLOW
GLUCOSE UR STRIP-MCNC: NEGATIVE MG/DL
HGB UR QL STRIP: ABNORMAL
HYALINE CASTS #/AREA URNS LPF: ABNORMAL LPF
KETONES UR STRIP-MCNC: NEGATIVE MG/DL
LEUKOCYTE ESTERASE UR QL STRIP: ABNORMAL
MUCOUS THREADS #/AREA URNS LPF: ABNORMAL LPF
NITRATE UR QL: POSITIVE
PH UR STRIP: 5.5 [PH] (ref 4.5–8)
RBC #/AREA URNS AUTO: ABNORMAL HPF
SP GR UR STRIP: 1.02 (ref 1–1.03)
SQUAMOUS #/AREA URNS AUTO: ABNORMAL LPF
UROBILINOGEN UR STRIP-ACNC: ABNORMAL
WBC #/AREA URNS AUTO: >100 HPF

## 2019-07-27 LAB — BACTERIA SPEC CULT: ABNORMAL

## 2019-09-16 ENCOUNTER — RECORDS - HEALTHEAST (OUTPATIENT)
Dept: LAB | Facility: CLINIC | Age: 64
End: 2019-09-16

## 2019-09-16 LAB
ALBUMIN UR-MCNC: NEGATIVE MG/DL
APPEARANCE UR: ABNORMAL
BACTERIA #/AREA URNS HPF: ABNORMAL HPF
BILIRUB UR QL STRIP: NEGATIVE
COLOR UR AUTO: ABNORMAL
GLUCOSE UR STRIP-MCNC: NEGATIVE MG/DL
HGB UR QL STRIP: NEGATIVE
KETONES UR STRIP-MCNC: NEGATIVE MG/DL
LEUKOCYTE ESTERASE UR QL STRIP: ABNORMAL
MUCOUS THREADS #/AREA URNS LPF: ABNORMAL LPF
NITRATE UR QL: NEGATIVE
PH UR STRIP: 5.5 [PH] (ref 4.5–8)
RBC #/AREA URNS AUTO: ABNORMAL HPF
SP GR UR STRIP: 1 (ref 1–1.03)
SQUAMOUS #/AREA URNS AUTO: ABNORMAL LPF
UROBILINOGEN UR STRIP-ACNC: ABNORMAL
WBC #/AREA URNS AUTO: ABNORMAL HPF

## 2019-09-18 LAB — BACTERIA SPEC CULT: ABNORMAL

## 2019-10-02 ENCOUNTER — RECORDS - HEALTHEAST (OUTPATIENT)
Dept: LAB | Facility: CLINIC | Age: 64
End: 2019-10-02

## 2019-10-02 LAB
ALBUMIN UR-MCNC: NEGATIVE MG/DL
APPEARANCE UR: CLEAR
BACTERIA #/AREA URNS HPF: ABNORMAL HPF
BILIRUB UR QL STRIP: NEGATIVE
COLOR UR AUTO: ABNORMAL
GLUCOSE UR STRIP-MCNC: NEGATIVE MG/DL
HGB UR QL STRIP: ABNORMAL
KETONES UR STRIP-MCNC: NEGATIVE MG/DL
LEUKOCYTE ESTERASE UR QL STRIP: ABNORMAL
MUCOUS THREADS #/AREA URNS LPF: ABNORMAL LPF
NITRATE UR QL: NEGATIVE
PH UR STRIP: 6 [PH] (ref 4.5–8)
RBC #/AREA URNS AUTO: ABNORMAL HPF
SP GR UR STRIP: 1.01 (ref 1–1.03)
SQUAMOUS #/AREA URNS AUTO: ABNORMAL LPF
UROBILINOGEN UR STRIP-ACNC: ABNORMAL
WBC #/AREA URNS AUTO: ABNORMAL HPF
WBC CLUMPS #/AREA URNS HPF: PRESENT /[HPF]

## 2019-10-04 LAB — BACTERIA SPEC CULT: ABNORMAL

## 2019-11-04 ENCOUNTER — RECORDS - HEALTHEAST (OUTPATIENT)
Dept: LAB | Facility: CLINIC | Age: 64
End: 2019-11-04

## 2019-11-04 LAB
ALBUMIN UR-MCNC: NEGATIVE MG/DL
APPEARANCE UR: CLEAR
BACTERIA #/AREA URNS HPF: ABNORMAL HPF
BILIRUB UR QL STRIP: NEGATIVE
COLOR UR AUTO: ABNORMAL
GLUCOSE UR STRIP-MCNC: NEGATIVE MG/DL
HGB UR QL STRIP: NEGATIVE
KETONES UR STRIP-MCNC: NEGATIVE MG/DL
LEUKOCYTE ESTERASE UR QL STRIP: ABNORMAL
MUCOUS THREADS #/AREA URNS LPF: ABNORMAL LPF
NITRATE UR QL: NEGATIVE
PH UR STRIP: 6 [PH] (ref 4.5–8)
RBC #/AREA URNS AUTO: ABNORMAL HPF
SP GR UR STRIP: 1.01 (ref 1–1.03)
SQUAMOUS #/AREA URNS AUTO: ABNORMAL LPF
UROBILINOGEN UR STRIP-ACNC: ABNORMAL
WBC #/AREA URNS AUTO: ABNORMAL HPF

## 2019-11-06 LAB — BACTERIA SPEC CULT: ABNORMAL

## 2019-11-14 ENCOUNTER — RECORDS - HEALTHEAST (OUTPATIENT)
Dept: LAB | Facility: CLINIC | Age: 64
End: 2019-11-14

## 2019-11-15 LAB — HBA1C MFR BLD: 5.7 % (ref 4.2–6.1)

## 2020-02-07 ENCOUNTER — RECORDS - HEALTHEAST (OUTPATIENT)
Dept: LAB | Facility: CLINIC | Age: 65
End: 2020-02-07

## 2020-02-07 LAB
BASOPHILS # BLD AUTO: 0.1 THOU/UL (ref 0–0.2)
BASOPHILS NFR BLD AUTO: 1 % (ref 0–2)
BNP SERPL-MCNC: 28 PG/ML (ref 0–106)
EOSINOPHIL # BLD AUTO: 0.2 THOU/UL (ref 0–0.4)
EOSINOPHIL NFR BLD AUTO: 3 % (ref 0–6)
ERYTHROCYTE [DISTWIDTH] IN BLOOD BY AUTOMATED COUNT: 13.9 % (ref 11–14.5)
HCT VFR BLD AUTO: 36.4 % (ref 35–47)
HGB BLD-MCNC: 10.9 G/DL (ref 12–16)
LYMPHOCYTES # BLD AUTO: 3.2 THOU/UL (ref 0.8–4.4)
LYMPHOCYTES NFR BLD AUTO: 42 % (ref 20–40)
MCH RBC QN AUTO: 27 PG (ref 27–34)
MCHC RBC AUTO-ENTMCNC: 29.9 G/DL (ref 32–36)
MCV RBC AUTO: 90 FL (ref 80–100)
MONOCYTES # BLD AUTO: 0.6 THOU/UL (ref 0–0.9)
MONOCYTES NFR BLD AUTO: 9 % (ref 2–10)
NEUTROPHILS # BLD AUTO: 3.4 THOU/UL (ref 2–7.7)
NEUTROPHILS NFR BLD AUTO: 46 % (ref 50–70)
PLATELET # BLD AUTO: 291 THOU/UL (ref 140–440)
PMV BLD AUTO: 9.7 FL (ref 8.5–12.5)
RBC # BLD AUTO: 4.03 MILL/UL (ref 3.8–5.4)
TSH SERPL DL<=0.005 MIU/L-ACNC: 0.63 UIU/ML (ref 0.3–5)
WBC: 7.5 THOU/UL (ref 4–11)

## 2020-02-15 ENCOUNTER — RECORDS - HEALTHEAST (OUTPATIENT)
Dept: LAB | Facility: CLINIC | Age: 65
End: 2020-02-15

## 2020-02-15 LAB
ALBUMIN UR-MCNC: ABNORMAL MG/DL
APPEARANCE UR: ABNORMAL
BACTERIA #/AREA URNS HPF: ABNORMAL HPF
BILIRUB UR QL STRIP: NEGATIVE
CAOX CRY #/AREA URNS HPF: PRESENT /[HPF]
COLOR UR AUTO: YELLOW
GLUCOSE UR STRIP-MCNC: NEGATIVE MG/DL
HGB UR QL STRIP: ABNORMAL
KETONES UR STRIP-MCNC: NEGATIVE MG/DL
LEUKOCYTE ESTERASE UR QL STRIP: ABNORMAL
MUCOUS THREADS #/AREA URNS LPF: ABNORMAL LPF
NITRATE UR QL: POSITIVE
PH UR STRIP: 5.5 [PH] (ref 4.5–8)
RBC #/AREA URNS AUTO: ABNORMAL HPF
SP GR UR STRIP: 1.02 (ref 1–1.03)
SQUAMOUS #/AREA URNS AUTO: ABNORMAL LPF
UROBILINOGEN UR STRIP-ACNC: ABNORMAL
WBC #/AREA URNS AUTO: ABNORMAL HPF

## 2020-02-17 LAB — BACTERIA SPEC CULT: ABNORMAL

## 2020-03-13 ENCOUNTER — RECORDS - HEALTHEAST (OUTPATIENT)
Dept: LAB | Facility: CLINIC | Age: 65
End: 2020-03-13

## 2020-03-16 LAB
ANION GAP SERPL CALCULATED.3IONS-SCNC: 8 MMOL/L (ref 5–18)
BUN SERPL-MCNC: 13 MG/DL (ref 8–22)
CALCIUM SERPL-MCNC: 10 MG/DL (ref 8.5–10.5)
CHLORIDE BLD-SCNC: 103 MMOL/L (ref 98–107)
CO2 SERPL-SCNC: 25 MMOL/L (ref 22–31)
CREAT SERPL-MCNC: 0.73 MG/DL (ref 0.6–1.1)
GFR SERPL CREATININE-BSD FRML MDRD: >60 ML/MIN/1.73M2
GLUCOSE BLD-MCNC: 95 MG/DL (ref 70–125)
POTASSIUM BLD-SCNC: 4.3 MMOL/L (ref 3.5–5)
SODIUM SERPL-SCNC: 136 MMOL/L (ref 136–145)

## 2020-03-30 ENCOUNTER — RECORDS - HEALTHEAST (OUTPATIENT)
Dept: LAB | Facility: CLINIC | Age: 65
End: 2020-03-30

## 2020-03-30 LAB
ALBUMIN UR-MCNC: ABNORMAL MG/DL
APPEARANCE UR: ABNORMAL
BACTERIA #/AREA URNS HPF: ABNORMAL HPF
BILIRUB UR QL STRIP: NEGATIVE
CAOX CRY #/AREA URNS HPF: PRESENT /[HPF]
COLOR UR AUTO: YELLOW
GLUCOSE UR STRIP-MCNC: NEGATIVE MG/DL
HGB UR QL STRIP: ABNORMAL
KETONES UR STRIP-MCNC: NEGATIVE MG/DL
LEUKOCYTE ESTERASE UR QL STRIP: ABNORMAL
MUCOUS THREADS #/AREA URNS LPF: ABNORMAL LPF
NITRATE UR QL: POSITIVE
PH UR STRIP: 5.5 [PH] (ref 4.5–8)
RBC #/AREA URNS AUTO: ABNORMAL HPF
SP GR UR STRIP: 1.02 (ref 1–1.03)
SQUAMOUS #/AREA URNS AUTO: ABNORMAL LPF
UROBILINOGEN UR STRIP-ACNC: ABNORMAL
WBC #/AREA URNS AUTO: ABNORMAL HPF
WBC CLUMPS #/AREA URNS HPF: PRESENT /[HPF]

## 2020-04-01 LAB — BACTERIA SPEC CULT: ABNORMAL

## 2020-04-21 ENCOUNTER — RECORDS - HEALTHEAST (OUTPATIENT)
Dept: LAB | Facility: CLINIC | Age: 65
End: 2020-04-21

## 2020-04-23 LAB — BACTERIA SPEC CULT: ABNORMAL

## 2020-05-23 ENCOUNTER — RECORDS - HEALTHEAST (OUTPATIENT)
Dept: LAB | Facility: CLINIC | Age: 65
End: 2020-05-23

## 2020-05-23 LAB
ALBUMIN UR-MCNC: NEGATIVE MG/DL
APPEARANCE UR: ABNORMAL
BACTERIA #/AREA URNS HPF: ABNORMAL HPF
BILIRUB UR QL STRIP: NEGATIVE
COLOR UR AUTO: ABNORMAL
GLUCOSE UR STRIP-MCNC: NEGATIVE MG/DL
HGB UR QL STRIP: ABNORMAL
KETONES UR STRIP-MCNC: NEGATIVE MG/DL
LEUKOCYTE ESTERASE UR QL STRIP: ABNORMAL
NITRATE UR QL: NEGATIVE
PH UR STRIP: 6 [PH] (ref 4.5–8)
RBC #/AREA URNS AUTO: ABNORMAL HPF
SP GR UR STRIP: 1.01 (ref 1–1.03)
SQUAMOUS #/AREA URNS AUTO: ABNORMAL LPF
UROBILINOGEN UR STRIP-ACNC: ABNORMAL
WBC #/AREA URNS AUTO: ABNORMAL HPF
WBC CLUMPS #/AREA URNS HPF: PRESENT /[HPF]

## 2020-05-26 LAB
BACTERIA SPEC CULT: ABNORMAL
BACTERIA SPEC CULT: ABNORMAL

## 2020-06-14 ENCOUNTER — RECORDS - HEALTHEAST (OUTPATIENT)
Dept: LAB | Facility: CLINIC | Age: 65
End: 2020-06-14

## 2020-06-17 LAB
BACTERIA SPEC CULT: ABNORMAL
BACTERIA SPEC CULT: ABNORMAL

## 2020-08-27 ENCOUNTER — RECORDS - HEALTHEAST (OUTPATIENT)
Dept: LAB | Facility: CLINIC | Age: 65
End: 2020-08-27

## 2020-08-27 LAB
ALBUMIN UR-MCNC: NEGATIVE MG/DL
APPEARANCE UR: ABNORMAL
BACTERIA #/AREA URNS HPF: ABNORMAL HPF
BILIRUB UR QL STRIP: NEGATIVE
COLOR UR AUTO: ABNORMAL
GLUCOSE UR STRIP-MCNC: NEGATIVE MG/DL
HGB UR QL STRIP: NEGATIVE
KETONES UR STRIP-MCNC: NEGATIVE MG/DL
LEUKOCYTE ESTERASE UR QL STRIP: ABNORMAL
NITRATE UR QL: NEGATIVE
PH UR STRIP: 5.5 [PH] (ref 4.5–8)
RBC #/AREA URNS AUTO: ABNORMAL HPF
SP GR UR STRIP: 1.01 (ref 1–1.03)
SQUAMOUS #/AREA URNS AUTO: ABNORMAL LPF
UROBILINOGEN UR STRIP-ACNC: ABNORMAL
WBC #/AREA URNS AUTO: ABNORMAL HPF
WBC CLUMPS #/AREA URNS HPF: PRESENT /[HPF]

## 2020-08-29 LAB — BACTERIA SPEC CULT: NORMAL

## 2020-10-07 ENCOUNTER — RECORDS - HEALTHEAST (OUTPATIENT)
Dept: LAB | Facility: CLINIC | Age: 65
End: 2020-10-07

## 2020-10-08 LAB
ALBUMIN SERPL-MCNC: 3.4 G/DL (ref 3.5–5)
ALP SERPL-CCNC: 101 U/L (ref 45–120)
ALT SERPL W P-5'-P-CCNC: 23 U/L (ref 0–45)
ANION GAP SERPL CALCULATED.3IONS-SCNC: 9 MMOL/L (ref 5–18)
AST SERPL W P-5'-P-CCNC: 16 U/L (ref 0–40)
BILIRUB DIRECT SERPL-MCNC: <0.1 MG/DL
BILIRUB SERPL-MCNC: 0.2 MG/DL (ref 0–1)
BUN SERPL-MCNC: 12 MG/DL (ref 8–22)
CALCIUM SERPL-MCNC: 10.1 MG/DL (ref 8.5–10.5)
CHLORIDE BLD-SCNC: 105 MMOL/L (ref 98–107)
CO2 SERPL-SCNC: 24 MMOL/L (ref 22–31)
CREAT SERPL-MCNC: 0.7 MG/DL (ref 0.6–1.1)
ERYTHROCYTE [DISTWIDTH] IN BLOOD BY AUTOMATED COUNT: 14.5 % (ref 11–14.5)
GFR SERPL CREATININE-BSD FRML MDRD: >60 ML/MIN/1.73M2
GLUCOSE BLD-MCNC: 131 MG/DL (ref 70–125)
HBA1C MFR BLD: 5.6 %
HCT VFR BLD AUTO: 33 % (ref 35–47)
HGB BLD-MCNC: 10.5 G/DL (ref 12–16)
MCH RBC QN AUTO: 28.2 PG (ref 27–34)
MCHC RBC AUTO-ENTMCNC: 31.8 G/DL (ref 32–36)
MCV RBC AUTO: 89 FL (ref 80–100)
PLATELET # BLD AUTO: 293 THOU/UL (ref 140–440)
PMV BLD AUTO: 9.7 FL (ref 8.5–12.5)
POTASSIUM BLD-SCNC: 3.8 MMOL/L (ref 3.5–5)
PROT SERPL-MCNC: 7 G/DL (ref 6–8)
RBC # BLD AUTO: 3.72 MILL/UL (ref 3.8–5.4)
SODIUM SERPL-SCNC: 138 MMOL/L (ref 136–145)
TSH SERPL DL<=0.005 MIU/L-ACNC: 1.79 UIU/ML (ref 0.3–5)
WBC: 8.1 THOU/UL (ref 4–11)

## 2020-11-23 ENCOUNTER — RECORDS - HEALTHEAST (OUTPATIENT)
Dept: LAB | Facility: CLINIC | Age: 65
End: 2020-11-23

## 2020-11-23 LAB
ALBUMIN UR-MCNC: NEGATIVE MG/DL
APPEARANCE UR: ABNORMAL
BACTERIA #/AREA URNS HPF: ABNORMAL HPF
BILIRUB UR QL STRIP: NEGATIVE
COLOR UR AUTO: ABNORMAL
GLUCOSE UR STRIP-MCNC: NEGATIVE MG/DL
HGB UR QL STRIP: ABNORMAL
KETONES UR STRIP-MCNC: NEGATIVE MG/DL
LEUKOCYTE ESTERASE UR QL STRIP: ABNORMAL
MUCOUS THREADS #/AREA URNS LPF: ABNORMAL LPF
NITRATE UR QL: NEGATIVE
PH UR STRIP: 6 [PH] (ref 4.5–8)
RBC #/AREA URNS AUTO: ABNORMAL HPF
SP GR UR STRIP: 1.01 (ref 1–1.03)
SQUAMOUS #/AREA URNS AUTO: ABNORMAL LPF
UROBILINOGEN UR STRIP-ACNC: ABNORMAL
WBC #/AREA URNS AUTO: ABNORMAL HPF

## 2020-11-24 LAB
ANION GAP SERPL CALCULATED.3IONS-SCNC: 9 MMOL/L (ref 5–18)
BUN SERPL-MCNC: 15 MG/DL (ref 8–22)
CALCIUM SERPL-MCNC: 9.8 MG/DL (ref 8.5–10.5)
CHLORIDE BLD-SCNC: 103 MMOL/L (ref 98–107)
CO2 SERPL-SCNC: 23 MMOL/L (ref 22–31)
CREAT SERPL-MCNC: 0.71 MG/DL (ref 0.6–1.1)
ERYTHROCYTE [DISTWIDTH] IN BLOOD BY AUTOMATED COUNT: 14.8 % (ref 11–14.5)
GFR SERPL CREATININE-BSD FRML MDRD: >60 ML/MIN/1.73M2
GLUCOSE BLD-MCNC: 103 MG/DL (ref 70–125)
HCT VFR BLD AUTO: 36.7 % (ref 35–47)
HGB BLD-MCNC: 11.1 G/DL (ref 12–16)
MCH RBC QN AUTO: 27.7 PG (ref 27–34)
MCHC RBC AUTO-ENTMCNC: 30.2 G/DL (ref 32–36)
MCV RBC AUTO: 92 FL (ref 80–100)
PLATELET # BLD AUTO: 226 THOU/UL (ref 140–440)
PMV BLD AUTO: 9.8 FL (ref 8.5–12.5)
POTASSIUM BLD-SCNC: 4.3 MMOL/L (ref 3.5–5)
RBC # BLD AUTO: 4.01 MILL/UL (ref 3.8–5.4)
SODIUM SERPL-SCNC: 135 MMOL/L (ref 136–145)
WBC: 4.5 THOU/UL (ref 4–11)

## 2020-11-26 LAB
BACTERIA SPEC CULT: ABNORMAL
BACTERIA SPEC CULT: ABNORMAL

## 2021-03-06 ENCOUNTER — RECORDS - HEALTHEAST (OUTPATIENT)
Dept: LAB | Facility: CLINIC | Age: 66
End: 2021-03-06

## 2021-03-06 LAB
ALBUMIN UR-MCNC: ABNORMAL MG/DL
AMORPH CRY #/AREA URNS HPF: ABNORMAL /[HPF]
APPEARANCE UR: ABNORMAL
BACTERIA #/AREA URNS HPF: ABNORMAL HPF
BILIRUB UR QL STRIP: NEGATIVE
COLOR UR AUTO: ABNORMAL
GLUCOSE UR STRIP-MCNC: NEGATIVE MG/DL
HGB UR QL STRIP: ABNORMAL
KETONES UR STRIP-MCNC: NEGATIVE MG/DL
LEUKOCYTE ESTERASE UR QL STRIP: ABNORMAL
MUCOUS THREADS #/AREA URNS LPF: ABNORMAL LPF
NITRATE UR QL: POSITIVE
PH UR STRIP: 6 [PH] (ref 4.5–8)
RBC #/AREA URNS AUTO: ABNORMAL HPF
SP GR UR STRIP: 1.02 (ref 1–1.03)
SQUAMOUS #/AREA URNS AUTO: ABNORMAL LPF
UROBILINOGEN UR STRIP-ACNC: ABNORMAL
WBC #/AREA URNS AUTO: >100 HPF
WBC CLUMPS #/AREA URNS HPF: PRESENT /[HPF]

## 2021-03-09 LAB — BACTERIA SPEC CULT: ABNORMAL

## 2021-03-10 ENCOUNTER — APPOINTMENT (OUTPATIENT)
Dept: CT IMAGING | Facility: CLINIC | Age: 66
DRG: 178 | End: 2021-03-10
Attending: EMERGENCY MEDICINE
Payer: MEDICARE

## 2021-03-10 ENCOUNTER — HOSPITAL ENCOUNTER (INPATIENT)
Facility: CLINIC | Age: 66
LOS: 5 days | Discharge: NURSING, REHAB OR LONG-TERM CARE | DRG: 178 | End: 2021-03-15
Attending: EMERGENCY MEDICINE | Admitting: INTERNAL MEDICINE
Payer: MEDICARE

## 2021-03-10 ENCOUNTER — APPOINTMENT (OUTPATIENT)
Dept: GENERAL RADIOLOGY | Facility: CLINIC | Age: 66
DRG: 178 | End: 2021-03-10
Attending: EMERGENCY MEDICINE
Payer: MEDICARE

## 2021-03-10 DIAGNOSIS — J18.8 OTHER PNEUMONIA, UNSPECIFIED ORGANISM: ICD-10-CM

## 2021-03-10 DIAGNOSIS — N39.0 URINARY TRACT INFECTION WITHOUT HEMATURIA, SITE UNSPECIFIED: ICD-10-CM

## 2021-03-10 DIAGNOSIS — N39.0 UTI (URINARY TRACT INFECTION) WITH PYURIA: ICD-10-CM

## 2021-03-10 DIAGNOSIS — Z11.52 ENCOUNTER FOR SCREENING LABORATORY TESTING FOR SEVERE ACUTE RESPIRATORY SYNDROME CORONAVIRUS 2 (SARS-COV-2): ICD-10-CM

## 2021-03-10 DIAGNOSIS — J18.9 PNEUMONIA OF LEFT LOWER LOBE DUE TO INFECTIOUS ORGANISM: ICD-10-CM

## 2021-03-10 DIAGNOSIS — J96.01 ACUTE RESPIRATORY FAILURE WITH HYPOXIA (H): ICD-10-CM

## 2021-03-10 LAB
ALBUMIN SERPL-MCNC: 3.3 G/DL (ref 3.4–5)
ALBUMIN UR-MCNC: 50 MG/DL
ALP SERPL-CCNC: 104 U/L (ref 40–150)
ALT SERPL W P-5'-P-CCNC: 32 U/L (ref 0–50)
ANION GAP SERPL CALCULATED.3IONS-SCNC: 6 MMOL/L (ref 3–14)
APPEARANCE UR: ABNORMAL
AST SERPL W P-5'-P-CCNC: 16 U/L (ref 0–45)
BACTERIA #/AREA URNS HPF: ABNORMAL /HPF
BASOPHILS # BLD AUTO: 0 10E9/L (ref 0–0.2)
BASOPHILS NFR BLD AUTO: 0.2 %
BILIRUB SERPL-MCNC: 0.6 MG/DL (ref 0.2–1.3)
BILIRUB UR QL STRIP: NEGATIVE
BUN SERPL-MCNC: 16 MG/DL (ref 7–30)
CALCIUM SERPL-MCNC: 9.9 MG/DL (ref 8.5–10.1)
CHLORIDE SERPL-SCNC: 105 MMOL/L (ref 94–109)
CO2 BLDCOV-SCNC: 27 MMOL/L (ref 21–28)
CO2 SERPL-SCNC: 27 MMOL/L (ref 20–32)
COLOR UR AUTO: YELLOW
CREAT SERPL-MCNC: 0.71 MG/DL (ref 0.52–1.04)
DIFFERENTIAL METHOD BLD: ABNORMAL
EOSINOPHIL # BLD AUTO: 0 10E9/L (ref 0–0.7)
EOSINOPHIL NFR BLD AUTO: 0.1 %
ERYTHROCYTE [DISTWIDTH] IN BLOOD BY AUTOMATED COUNT: 14.5 % (ref 10–15)
FLUAV RNA RESP QL NAA+PROBE: NEGATIVE
FLUBV RNA RESP QL NAA+PROBE: NEGATIVE
GFR SERPL CREATININE-BSD FRML MDRD: 88 ML/MIN/{1.73_M2}
GLUCOSE BLDC GLUCOMTR-MCNC: 120 MG/DL (ref 70–99)
GLUCOSE SERPL-MCNC: 132 MG/DL (ref 70–99)
GLUCOSE UR STRIP-MCNC: NEGATIVE MG/DL
HCT VFR BLD AUTO: 37.1 % (ref 35–47)
HGB BLD-MCNC: 11.6 G/DL (ref 11.7–15.7)
HGB UR QL STRIP: ABNORMAL
IMM GRANULOCYTES # BLD: 0.1 10E9/L (ref 0–0.4)
IMM GRANULOCYTES NFR BLD: 0.7 %
KETONES UR STRIP-MCNC: NEGATIVE MG/DL
LABORATORY COMMENT REPORT: NORMAL
LACTATE BLD-SCNC: 1.5 MMOL/L (ref 0.7–2.1)
LEUKOCYTE ESTERASE UR QL STRIP: ABNORMAL
LYMPHOCYTES # BLD AUTO: 1.5 10E9/L (ref 0.8–5.3)
LYMPHOCYTES NFR BLD AUTO: 11.9 %
MAGNESIUM SERPL-MCNC: 2.5 MG/DL (ref 1.6–2.3)
MCH RBC QN AUTO: 28.3 PG (ref 26.5–33)
MCHC RBC AUTO-ENTMCNC: 31.3 G/DL (ref 31.5–36.5)
MCV RBC AUTO: 91 FL (ref 78–100)
MONOCYTES # BLD AUTO: 1.2 10E9/L (ref 0–1.3)
MONOCYTES NFR BLD AUTO: 9.9 %
MUCOUS THREADS #/AREA URNS LPF: PRESENT /LPF
NEUTROPHILS # BLD AUTO: 9.5 10E9/L (ref 1.6–8.3)
NEUTROPHILS NFR BLD AUTO: 77.2 %
NITRATE UR QL: NEGATIVE
NRBC # BLD AUTO: 0 10*3/UL
NRBC BLD AUTO-RTO: 0 /100
PCO2 BLDV: 47 MM HG (ref 40–50)
PH BLDV: 7.36 PH (ref 7.32–7.43)
PH UR STRIP: 6 PH (ref 5–7)
PLATELET # BLD AUTO: 285 10E9/L (ref 150–450)
PO2 BLDV: 50 MM HG (ref 25–47)
POTASSIUM SERPL-SCNC: 4 MMOL/L (ref 3.4–5.3)
PROCALCITONIN SERPL-MCNC: 0.12 NG/ML
PROT SERPL-MCNC: 7.7 G/DL (ref 6.8–8.8)
RBC # BLD AUTO: 4.1 10E12/L (ref 3.8–5.2)
RBC #/AREA URNS AUTO: 7 /HPF (ref 0–2)
RSV RNA SPEC QL NAA+PROBE: NEGATIVE
SAO2 % BLDV FROM PO2: 83 %
SARS-COV-2 RNA RESP QL NAA+PROBE: NEGATIVE
SODIUM SERPL-SCNC: 138 MMOL/L (ref 133–144)
SOURCE: ABNORMAL
SP GR UR STRIP: 1.02 (ref 1–1.03)
SPECIMEN SOURCE: NORMAL
SQUAMOUS #/AREA URNS AUTO: 0 /HPF (ref 0–1)
UROBILINOGEN UR STRIP-MCNC: 3 MG/DL (ref 0–2)
WBC # BLD AUTO: 12.3 10E9/L (ref 4–11)
WBC #/AREA URNS AUTO: >182 /HPF (ref 0–5)
WBC CLUMPS #/AREA URNS HPF: PRESENT /HPF
YEAST #/AREA URNS HPF: ABNORMAL /HPF

## 2021-03-10 PROCEDURE — 72131 CT LUMBAR SPINE W/O DYE: CPT | Mod: 26 | Performed by: RADIOLOGY

## 2021-03-10 PROCEDURE — 96365 THER/PROPH/DIAG IV INF INIT: CPT | Performed by: EMERGENCY MEDICINE

## 2021-03-10 PROCEDURE — 85025 COMPLETE CBC W/AUTO DIFF WBC: CPT | Performed by: EMERGENCY MEDICINE

## 2021-03-10 PROCEDURE — 72131 CT LUMBAR SPINE W/O DYE: CPT

## 2021-03-10 PROCEDURE — 87040 BLOOD CULTURE FOR BACTERIA: CPT | Performed by: EMERGENCY MEDICINE

## 2021-03-10 PROCEDURE — 87186 SC STD MICRODIL/AGAR DIL: CPT | Performed by: EMERGENCY MEDICINE

## 2021-03-10 PROCEDURE — 250N000013 HC RX MED GY IP 250 OP 250 PS 637: Performed by: PATHOLOGY

## 2021-03-10 PROCEDURE — 83605 ASSAY OF LACTIC ACID: CPT

## 2021-03-10 PROCEDURE — 99223 1ST HOSP IP/OBS HIGH 75: CPT | Mod: AI | Performed by: INTERNAL MEDICINE

## 2021-03-10 PROCEDURE — 82803 BLOOD GASES ANY COMBINATION: CPT

## 2021-03-10 PROCEDURE — 87086 URINE CULTURE/COLONY COUNT: CPT | Performed by: EMERGENCY MEDICINE

## 2021-03-10 PROCEDURE — 250N000011 HC RX IP 250 OP 636: Performed by: EMERGENCY MEDICINE

## 2021-03-10 PROCEDURE — 72125 CT NECK SPINE W/O DYE: CPT | Mod: 26 | Performed by: RADIOLOGY

## 2021-03-10 PROCEDURE — 72125 CT NECK SPINE W/O DYE: CPT

## 2021-03-10 PROCEDURE — 250N000011 HC RX IP 250 OP 636: Performed by: PATHOLOGY

## 2021-03-10 PROCEDURE — 96367 TX/PROPH/DG ADDL SEQ IV INF: CPT | Performed by: EMERGENCY MEDICINE

## 2021-03-10 PROCEDURE — 99285 EMERGENCY DEPT VISIT HI MDM: CPT | Performed by: EMERGENCY MEDICINE

## 2021-03-10 PROCEDURE — 87636 SARSCOV2 & INF A&B AMP PRB: CPT | Performed by: EMERGENCY MEDICINE

## 2021-03-10 PROCEDURE — 999N001017 HC STATISTIC GLUCOSE BY METER IP

## 2021-03-10 PROCEDURE — 70450 CT HEAD/BRAIN W/O DYE: CPT

## 2021-03-10 PROCEDURE — C9803 HOPD COVID-19 SPEC COLLECT: HCPCS | Performed by: EMERGENCY MEDICINE

## 2021-03-10 PROCEDURE — 83735 ASSAY OF MAGNESIUM: CPT | Performed by: EMERGENCY MEDICINE

## 2021-03-10 PROCEDURE — 120N000002 HC R&B MED SURG/OB UMMC

## 2021-03-10 PROCEDURE — 80053 COMPREHEN METABOLIC PANEL: CPT | Performed by: EMERGENCY MEDICINE

## 2021-03-10 PROCEDURE — 36415 COLL VENOUS BLD VENIPUNCTURE: CPT | Performed by: EMERGENCY MEDICINE

## 2021-03-10 PROCEDURE — 72128 CT CHEST SPINE W/O DYE: CPT

## 2021-03-10 PROCEDURE — 71045 X-RAY EXAM CHEST 1 VIEW: CPT

## 2021-03-10 PROCEDURE — 71045 X-RAY EXAM CHEST 1 VIEW: CPT | Mod: 26 | Performed by: RADIOLOGY

## 2021-03-10 PROCEDURE — 84145 PROCALCITONIN (PCT): CPT | Performed by: EMERGENCY MEDICINE

## 2021-03-10 PROCEDURE — 250N000013 HC RX MED GY IP 250 OP 250 PS 637: Performed by: EMERGENCY MEDICINE

## 2021-03-10 PROCEDURE — 70450 CT HEAD/BRAIN W/O DYE: CPT | Mod: 26 | Performed by: RADIOLOGY

## 2021-03-10 PROCEDURE — 81001 URINALYSIS AUTO W/SCOPE: CPT | Performed by: EMERGENCY MEDICINE

## 2021-03-10 PROCEDURE — 96372 THER/PROPH/DIAG INJ SC/IM: CPT | Performed by: EMERGENCY MEDICINE

## 2021-03-10 PROCEDURE — 99285 EMERGENCY DEPT VISIT HI MDM: CPT | Mod: 25 | Performed by: EMERGENCY MEDICINE

## 2021-03-10 PROCEDURE — 72128 CT CHEST SPINE W/O DYE: CPT | Mod: 26 | Performed by: RADIOLOGY

## 2021-03-10 PROCEDURE — 87088 URINE BACTERIA CULTURE: CPT | Performed by: EMERGENCY MEDICINE

## 2021-03-10 RX ORDER — ALENDRONATE SODIUM 70 MG/1
70 TABLET ORAL
Status: CANCELLED | OUTPATIENT
Start: 2021-03-10

## 2021-03-10 RX ORDER — TRAZODONE HYDROCHLORIDE 50 MG/1
150 TABLET, FILM COATED ORAL AT BEDTIME
Status: DISCONTINUED | OUTPATIENT
Start: 2021-03-10 | End: 2021-03-15 | Stop reason: HOSPADM

## 2021-03-10 RX ORDER — LIDOCAINE 40 MG/G
CREAM TOPICAL
Status: DISCONTINUED | OUTPATIENT
Start: 2021-03-10 | End: 2021-03-15 | Stop reason: HOSPADM

## 2021-03-10 RX ORDER — ACETAMINOPHEN 500 MG
500-1000 TABLET ORAL EVERY 6 HOURS PRN
Status: DISCONTINUED | OUTPATIENT
Start: 2021-03-10 | End: 2021-03-10

## 2021-03-10 RX ORDER — ACETAMINOPHEN 325 MG/1
650 TABLET ORAL EVERY 4 HOURS PRN
Status: DISCONTINUED | OUTPATIENT
Start: 2021-03-10 | End: 2021-03-15 | Stop reason: HOSPADM

## 2021-03-10 RX ORDER — POTASSIUM CHLORIDE 1.5 G/1.58G
20 POWDER, FOR SOLUTION ORAL DAILY
Status: DISCONTINUED | OUTPATIENT
Start: 2021-03-11 | End: 2021-03-14 | Stop reason: ALTCHOICE

## 2021-03-10 RX ORDER — SENNOSIDES 8.6 MG
1 TABLET ORAL DAILY
COMMUNITY

## 2021-03-10 RX ORDER — DOCUSATE SODIUM 100 MG/1
100 CAPSULE, LIQUID FILLED ORAL 2 TIMES DAILY
Status: CANCELLED | OUTPATIENT
Start: 2021-03-10

## 2021-03-10 RX ORDER — ESTRADIOL 0.1 MG/G
1 CREAM VAGINAL
COMMUNITY

## 2021-03-10 RX ORDER — PROPRANOLOL HYDROCHLORIDE 20 MG/1
20 TABLET ORAL 2 TIMES DAILY
Status: CANCELLED | OUTPATIENT
Start: 2021-03-10

## 2021-03-10 RX ORDER — LEVOFLOXACIN 5 MG/ML
750 INJECTION, SOLUTION INTRAVENOUS ONCE
Status: COMPLETED | OUTPATIENT
Start: 2021-03-10 | End: 2021-03-10

## 2021-03-10 RX ORDER — LIDOCAINE 40 MG/G
CREAM TOPICAL
Status: CANCELLED | OUTPATIENT
Start: 2021-03-10

## 2021-03-10 RX ORDER — LISINOPRIL 10 MG/1
10 TABLET ORAL DAILY
Status: CANCELLED | OUTPATIENT
Start: 2021-03-10

## 2021-03-10 RX ORDER — HYDROXYZINE HYDROCHLORIDE 25 MG/1
25 TABLET, FILM COATED ORAL 2 TIMES DAILY PRN
Status: DISCONTINUED | OUTPATIENT
Start: 2021-03-10 | End: 2021-03-15 | Stop reason: HOSPADM

## 2021-03-10 RX ORDER — BACLOFEN 20 MG/1
20 TABLET ORAL 4 TIMES DAILY
Status: CANCELLED | OUTPATIENT
Start: 2021-03-10

## 2021-03-10 RX ORDER — TRAMADOL HYDROCHLORIDE 50 MG/1
50 TABLET ORAL 3 TIMES DAILY PRN
Status: DISCONTINUED | OUTPATIENT
Start: 2021-03-10 | End: 2021-03-15 | Stop reason: HOSPADM

## 2021-03-10 RX ORDER — RISPERIDONE 0.25 MG/1
0.25 TABLET ORAL
COMMUNITY
Start: 2020-04-28 | End: 2021-03-10

## 2021-03-10 RX ORDER — ASPIRIN 325 MG
325 TABLET ORAL DAILY
Status: CANCELLED | OUTPATIENT
Start: 2021-03-10

## 2021-03-10 RX ORDER — RISPERIDONE 0.25 MG/1
0.25 TABLET ORAL DAILY
Status: DISCONTINUED | OUTPATIENT
Start: 2021-03-11 | End: 2021-03-15 | Stop reason: HOSPADM

## 2021-03-10 RX ORDER — CEFTRIAXONE 2 G/1
2 INJECTION, POWDER, FOR SOLUTION INTRAMUSCULAR; INTRAVENOUS EVERY 24 HOURS
Status: DISCONTINUED | OUTPATIENT
Start: 2021-03-10 | End: 2021-03-13

## 2021-03-10 RX ORDER — QUETIAPINE FUMARATE 50 MG/1
50 TABLET, FILM COATED ORAL 2 TIMES DAILY
Status: DISCONTINUED | OUTPATIENT
Start: 2021-03-10 | End: 2021-03-15 | Stop reason: HOSPADM

## 2021-03-10 RX ORDER — BACLOFEN 20 MG/1
20 TABLET ORAL ONCE
Status: COMPLETED | OUTPATIENT
Start: 2021-03-10 | End: 2021-03-10

## 2021-03-10 RX ORDER — CLONAZEPAM 0.5 MG/1
0.5 TABLET ORAL 3 TIMES DAILY
Status: DISCONTINUED | OUTPATIENT
Start: 2021-03-10 | End: 2021-03-15 | Stop reason: HOSPADM

## 2021-03-10 RX ORDER — LEVOFLOXACIN 5 MG/ML
750 INJECTION, SOLUTION INTRAVENOUS EVERY 24 HOURS
Status: DISCONTINUED | OUTPATIENT
Start: 2021-03-11 | End: 2021-03-10

## 2021-03-10 RX ORDER — CLONAZEPAM 0.5 MG/1
1 TABLET ORAL 3 TIMES DAILY
Status: CANCELLED | OUTPATIENT
Start: 2021-03-10

## 2021-03-10 RX ORDER — VITAMIN B COMPLEX
50 TABLET ORAL DAILY
Status: DISCONTINUED | OUTPATIENT
Start: 2021-03-11 | End: 2021-03-15 | Stop reason: HOSPADM

## 2021-03-10 RX ORDER — CLONAZEPAM 0.5 MG/1
1 TABLET ORAL ONCE
Status: COMPLETED | OUTPATIENT
Start: 2021-03-10 | End: 2021-03-10

## 2021-03-10 RX ORDER — SENNOSIDES 8.6 MG
1 TABLET ORAL DAILY
Status: DISCONTINUED | OUTPATIENT
Start: 2021-03-11 | End: 2021-03-15 | Stop reason: HOSPADM

## 2021-03-10 RX ORDER — AMOXICILLIN 250 MG
2 CAPSULE ORAL 2 TIMES DAILY PRN
Status: CANCELLED | OUTPATIENT
Start: 2021-03-10

## 2021-03-10 RX ORDER — TRAMADOL HYDROCHLORIDE 50 MG/1
50 TABLET ORAL 2 TIMES DAILY PRN
Status: CANCELLED | OUTPATIENT
Start: 2021-03-10

## 2021-03-10 RX ORDER — IBUPROFEN 200 MG
200 TABLET ORAL 2 TIMES DAILY
Status: DISCONTINUED | OUTPATIENT
Start: 2021-03-10 | End: 2021-03-15 | Stop reason: HOSPADM

## 2021-03-10 RX ORDER — AMOXICILLIN 250 MG
1 CAPSULE ORAL 2 TIMES DAILY PRN
Status: CANCELLED | OUTPATIENT
Start: 2021-03-10

## 2021-03-10 RX ORDER — RISPERIDONE 0.25 MG/1
0.25 TABLET ORAL DAILY
Status: CANCELLED | OUTPATIENT
Start: 2021-03-10

## 2021-03-10 RX ORDER — ACETAMINOPHEN 325 MG/1
650 TABLET ORAL EVERY 4 HOURS PRN
Status: CANCELLED | OUTPATIENT
Start: 2021-03-10

## 2021-03-10 RX ORDER — CLONAZEPAM 0.5 MG/1
1 TABLET ORAL 2 TIMES DAILY
Status: DISCONTINUED | OUTPATIENT
Start: 2021-03-10 | End: 2021-03-10

## 2021-03-10 RX ORDER — TOPIRAMATE 25 MG/1
100 TABLET, FILM COATED ORAL 3 TIMES DAILY
Status: CANCELLED | OUTPATIENT
Start: 2021-03-10

## 2021-03-10 RX ORDER — OLANZAPINE 5 MG/1
5 TABLET, ORALLY DISINTEGRATING ORAL ONCE
Status: COMPLETED | OUTPATIENT
Start: 2021-03-10 | End: 2021-03-10

## 2021-03-10 RX ORDER — QUETIAPINE FUMARATE 100 MG/1
100 TABLET, FILM COATED ORAL AT BEDTIME
Status: CANCELLED | OUTPATIENT
Start: 2021-03-10

## 2021-03-10 RX ORDER — TRAZODONE HYDROCHLORIDE 150 MG/1
150 TABLET ORAL AT BEDTIME
COMMUNITY

## 2021-03-10 RX ORDER — HYDROXYZINE HYDROCHLORIDE 25 MG/1
25 TABLET, FILM COATED ORAL 2 TIMES DAILY PRN
Status: CANCELLED | OUTPATIENT
Start: 2021-03-10

## 2021-03-10 RX ORDER — BACLOFEN 10 MG/1
20 TABLET ORAL 3 TIMES DAILY
Status: DISCONTINUED | OUTPATIENT
Start: 2021-03-10 | End: 2021-03-15 | Stop reason: HOSPADM

## 2021-03-10 RX ORDER — PHENAZOPYRIDINE HYDROCHLORIDE 100 MG/1
100 TABLET, FILM COATED ORAL
Status: DISCONTINUED | OUTPATIENT
Start: 2021-03-10 | End: 2021-03-15 | Stop reason: HOSPADM

## 2021-03-10 RX ORDER — ONDANSETRON 4 MG/1
4 TABLET, ORALLY DISINTEGRATING ORAL EVERY 6 HOURS PRN
Status: DISCONTINUED | OUTPATIENT
Start: 2021-03-10 | End: 2021-03-15 | Stop reason: HOSPADM

## 2021-03-10 RX ORDER — OLANZAPINE 5 MG/1
5 TABLET, ORALLY DISINTEGRATING ORAL AT BEDTIME
Status: DISCONTINUED | OUTPATIENT
Start: 2021-03-10 | End: 2021-03-10

## 2021-03-10 RX ORDER — ONDANSETRON 2 MG/ML
4 INJECTION INTRAMUSCULAR; INTRAVENOUS EVERY 6 HOURS PRN
Status: DISCONTINUED | OUTPATIENT
Start: 2021-03-10 | End: 2021-03-15 | Stop reason: HOSPADM

## 2021-03-10 RX ADMIN — QUETIAPINE 50 MG: 50 TABLET ORAL at 21:16

## 2021-03-10 RX ADMIN — LEVOFLOXACIN 750 MG: 5 INJECTION, SOLUTION INTRAVENOUS at 14:36

## 2021-03-10 RX ADMIN — OLANZAPINE 5 MG: 5 TABLET, ORALLY DISINTEGRATING ORAL at 12:36

## 2021-03-10 RX ADMIN — ENOXAPARIN SODIUM 40 MG: 40 INJECTION SUBCUTANEOUS at 21:16

## 2021-03-10 RX ADMIN — TRAZODONE HYDROCHLORIDE 150 MG: 50 TABLET ORAL at 21:17

## 2021-03-10 RX ADMIN — OMEPRAZOLE 20 MG: 20 CAPSULE, DELAYED RELEASE ORAL at 21:17

## 2021-03-10 RX ADMIN — BACLOFEN 20 MG: 20 TABLET ORAL at 18:33

## 2021-03-10 RX ADMIN — IBUPROFEN 200 MG: 200 TABLET, FILM COATED ORAL at 21:17

## 2021-03-10 RX ADMIN — CEFTRIAXONE SODIUM 2 G: 2 INJECTION, POWDER, FOR SOLUTION INTRAMUSCULAR; INTRAVENOUS at 21:17

## 2021-03-10 RX ADMIN — PHENAZOPYRIDINE HYDROCHLORIDE 100 MG: 100 TABLET, FILM COATED ORAL at 18:33

## 2021-03-10 RX ADMIN — Medication 1 MG: at 19:51

## 2021-03-10 RX ADMIN — OLANZAPINE 5 MG: 5 TABLET, ORALLY DISINTEGRATING ORAL at 16:47

## 2021-03-10 RX ADMIN — CLONAZEPAM 1 MG: 0.5 TABLET ORAL at 17:51

## 2021-03-10 RX ADMIN — BACLOFEN 20 MG: 20 TABLET ORAL at 21:17

## 2021-03-10 RX ADMIN — CLONAZEPAM 0.5 MG: 0.5 TABLET ORAL at 21:16

## 2021-03-10 RX ADMIN — ACETAMINOPHEN 1000 MG: 500 TABLET, FILM COATED ORAL at 19:51

## 2021-03-10 ASSESSMENT — MIFFLIN-ST. JEOR: SCORE: 1747.45

## 2021-03-10 ASSESSMENT — ENCOUNTER SYMPTOMS
FREQUENCY: 1
FEVER: 0
DIFFICULTY URINATING: 0
ABDOMINAL PAIN: 0
CONFUSION: 1
NECK STIFFNESS: 0
SHORTNESS OF BREATH: 0
COLOR CHANGE: 0
EYE REDNESS: 0
HEADACHES: 0
DYSURIA: 1
ARTHRALGIAS: 0

## 2021-03-10 NOTE — H&P
[facility]    History and Physical - [facility] Service        Date of Admission:  3/10/2021    Assessment & Plan   Shelley Greenwood is a 65 year old female with history of multiple sclerosis, anxiety, bipolar I, CVA, HTN, GERD, who is brought in from her facility, [facility], for altered mental status s/p fall while transferring from bed to chair, found on workup in ED to have UTI and concern for PNA.     #Acute Encephalopathy  #Multiple Sclerosis   #Bipolar Type I with paranoia  #Fall  Patient has a long history of MS, bipolar I, cognitive decline, and h/o several hospital admission with Psychiatry for paranoia / bipolar with psychotic features. Of note, patient had a similar admission here in 2015 for encephalopathy at which time she was also found to have a UTI.  ED workup notable for leukocytosis (12.3), anemia (hgb 11.6), lactate WNL, electrolytes WNL, UA c/w UTI, and CXR with LLL infiltrate. CT head and C/T spine without acute finding. In the absence of fever, neck rigidity, hemodynamic instability, meningitis seems less likely. Unclear cause of encephalopathy at this time however could be infectious (UTI, LLL PNA) vs exacerbation of an underlying psychiatric condition given her history of bipolar I w/psychotic features.  - Treat infection as below  - Continue PTA clonazepam, quetiapine, risperidone  - Olanzapine PRN agitation     #UTI  UA c/w UTI. Patient with R CVA tenderness, unclear whether new or chronic pain. Was started on levofloxacin in the ED. Per nursing home notes, pt has hx of frequent UTI and is treated with oral abx. Reviewed UCxs on file, prior multidrug resistant UTIs (Enterobacter cloacae 3/10/15 and E coli 3/7/17).   -check procal  -F/u UCx and susceptibilities  -Switch from levofloxacin to ceftriaxone 2g qd (E Coli UTI in 2017 was resistant to levofloxacin)    #Community Acquired Pneumonia versus aspiration pneumonia  CXR in the ED  "suggests LLL pneumonia. Mild leukocytosis, Lactate WNL. Unclear if pneumonia stemming from an aspiration event versus CAP. Levofloxacin initiated in ED. No supplemental O2 requirement currently.   -Will monitor closely and adjust abx as needed  -Consider sputum cx    #Concern for seizure  #Fever  Based on report by EMS/nursing home. Patient apparently with \"posturing\" and \"nystagmus\" following fall. Possible a febrile seizure (temp 103.2 in ED) or triggered by infection. Pt denies hx of seizure. Will monitor.   - Monitor for seizure activity  - Tylenol PRN seizure  - IV Ativan PRN seizure    Chronic medical issues:  Multiple sclerosis: Stable; patient reports new sx or functional decline. Follows @. Uses electronic wheelchair  Neurogenic bladder: due to MS. Bladder scan qshift.   Chronic pain: continue PTA tylenol, ibuprofen, tramadol.   GI: continue omeprazole 20mg every day  Bipolar I with delusions and paranoia; Anxiety: continue PTA risperidone, clonazepam, seroquel, and trazadone.   Constipation: continue daily Senna (nursing home notes reviewed)       Diet: Regular Diet Adult  Fluids: none  DVT Prophylaxis: Enoxaparin 40mg qd  Carpio Catheter: not present  Code Status:   full code  Family: has POA (Vani Stack - Sister)  COVID: negative 3/10/21; covid recovered, does have hx of prior positive ~11/2020     Disposition Plan   Expected discharge: 2 - 3 days, recommended to prior living arrangement once UTI/PNA treated, symproms improve and fever resolves  Entered: Raphael Duff 03/10/2021, 5:23 PM     The patient's care was discussed with the Attending Physician, Dr. Poly Hampton.    Note written with assistance of Raphael Duff MS3, changes made where needed.      Giuliana Farah MD MPH  Internal Medicine Resident  [facility] [address] [place] [address]  Please see sign in/sign out for up to date coverage " "information  ______________________________________________________________________    Chief Complaint   Altered mental status    History is obtained from the patient, electronic health record, emergency department staff and paper chart though history per patient limited secondary to altered mental status.    History of Present Illness   Shelley Greenwood is a 65 year old female who has a history of multiple sclerosis, anxiety, bipolar I, CVA, HTN, GERD and is admitted for altered mental status. This morning at 0830, the patient was found down in her room at the [facility] where she lives. Per ER staff who spoke with EMS, patient had some \"abnormal posturing\" and nystagmus on their arrival. Per patient, she was transferring from her bed to her mobility chair when she missed her chair and fell to the ground. She did not hit her head. Patient denied any symptoms preceding the fall such as lightheadedness, dizziness, headache, palpitations. She states she just felt weak and fell before making it into her chair. After the fall, she denies any headache, nausea, vomiting, vision changes or loss of consciousness.     For the last 3 days, patient does endorse decreased appetite, dysuria, urinary frequency and urgency. She states her urine feels \"hot.\" Accompanying symptoms include a non productive dry cough. No fevers, chills, or hemoptysis.     Review of Systems    The 10 point Review of Systems is negative other than noted in the HPI or here.    Past Medical History    I have reviewed this patient's medical history and updated it with pertinent information if needed.   Past Medical History:   Diagnosis Date     Bipolar 1 disorder (H)      Bipolar disorder (H)      Cerebral artery occlusion with cerebral infarction (H)      Cognitive dysfunction      Depressive disorder      Gastroesophageal reflux disease      Glaucoma suspect of both eyes      Glaucoma suspect of right eye      Hypertension      Multiple " scleroses      Multiple sclerosis (H)      Neuromuscular disorder (H)     ms 30 years     Nonsenile cataract      Obese       Past Surgical History   I have reviewed this patient's surgical history and updated it with pertinent information if needed.  Past Surgical History:   Procedure Laterality Date     CATARACT IOL, RT/LT       GALLBLADDER SURGERY       GYN SURGERY      hysterectomy     ORTHOPEDIC SURGERY      right thumb     PHACOEMULSIFICATION CLEAR CORNEA WITH STANDARD INTRAOCULAR LENS IMPLANT Right 10/22/2018    Procedure: RIGHT EYE PHACOEMULSIFICATION CLEAR CORNEA WITH STANDARD INTRAOCULAR LENS IMPLANT  ;  Surgeon: Cintia Orosco MD;  Location: [facility]     PHACOEMULSIFICATION CLEAR CORNEA WITH STANDARD INTRAOCULAR LENS IMPLANT Left 4/1/2019    Procedure: LEFT EYE PHACOEMULSIFICATION CLEAR CORNEA WITH STANDARD INTRAOCULAR LENS IMPLANT;  Surgeon: Cintia Orosco MD;  Location: [facility]        Social History   I have reviewed this patient's social history and updated it with pertinent information if needed. Shelley Greenwood lives at [facility]. Living family includes her sister, Vani Greenwood who lives in [place]. She  reports that she has quit smoking. She has never used smokeless tobacco. She reports that she does not drink alcohol or use drugs.    Family History   I have reviewed this patient's family history and updated it with pertinent information if needed.  Family History   Problem Relation Age of Onset     Glaucoma Mother      Macular Degeneration Father        Prior to Admission Medications   Prior to Admission Medications   Prescriptions Last Dose Informant Patient Reported? Taking?   AMANTADINE HCL PO  Care Giver Yes No   Sig: Take 50 mg by mouth 2 times daily   BACLOFEN PO   Yes No   Sig: Take 20 mg by mouth   CLONAZEPAM PO  Care Giver Yes No   Sig: Take 1 mg by mouth 3 times daily   EPINEPHrine (EPIPEN) 0.3 MG/0.3ML injection  Care Giver Yes No   Sig: Inject 0.3 mg into the muscle  once as needed for anaphylaxis   HYDROXYZINE HCL PO  Care Giver Yes No   Sig: Take 50 mg by mouth At Bedtime   Loxapine Succinate (LOXITANE PO)  Care Giver Yes No   Sig: Take 40 mg by mouth At Bedtime   MELATONIN PO  Care Giver Yes No   Sig: Take 6 mg by mouth At Bedtime   QUEtiapine Fumarate (SEROQUEL PO)  Care Giver Yes No   Sig: Take 100 mg by mouth At Bedtime   acetaminophen (TYLENOL) 500 MG tablet  Care Giver No No   Sig: Take 1-2 tablets (500-1,000 mg) by mouth every 6 hours as needed for mild pain   alendronate (FOSAMAX) 70 MG tablet  Care Giver No No   Sig: Take 1 tablet (70 mg) by mouth every 7 days   aspirin 325 MG tablet  Care Giver No No   Sig: Take 1 tablet (325 mg) by mouth daily   baclofen (LIORESAL) 20 MG tablet  Care Giver No No   Sig: Take 1 tablet (20 mg) by mouth 4 times daily   cholecalciferol 2000 units CAPS   Yes No   Sig: Take 1 tablet by mouth daily   ciprofloxacin (CIPRO) 500 MG tablet   No No   Sig: Take 1 tablet (500 mg) by mouth every 12 hours   clotrimazole (LOTRIMIN) 1 % cream   Yes No   Sig: Place 1 Applicatorful vaginally At Bedtime   divalproex (DEPAKOTE) 250 MG EC tablet  Care Giver Yes No   Sig: Take 1,750 mg by mouth At Bedtime   docusate sodium (COLACE) 100 MG capsule  Care Giver No No   Sig: Take 1 capsule (100 mg) by mouth 2 times daily   estradiol (ESTRACE) 0.1 MG/GM vaginal cream   Yes No   Sig: Place 1 g vaginally   hydrOXYzine (ATARAX) 25 MG tablet   Yes No   Sig: Take 1 tablet (25 mg) by mouth 2 times daily as needed for itching or other (anxiety)   ibuprofen (ADVIL/MOTRIN) 600 MG tablet   Yes No   Sig: Take 600 mg by mouth   lactulose (CHRONULAC) 10 GM/15ML solution   Yes No   Sig: Take 20 g by mouth 2 times daily   lisinopril (PRINIVIL,ZESTRIL) 10 MG tablet  Care Giver No No   Sig: Take 1 tablet (10 mg) by mouth daily   methyl salicylate-menthol (SATISH-GARCIA) OINT ointment  Care Giver No No   Sig: Apply 50 g topically every 6 hours as needed   omeprazole (PRILOSEC) 20 MG  capsule  Care Giver Yes No   Sig: Take 20 mg by mouth daily.   polyethylene glycol (MIRALAX/GLYCOLAX) packet  Care Giver No No   Sig: Take 17 g by mouth 2 times daily   potassium chloride (KLOR-CON) 20 MEQ Packet   Yes No   Sig: Take 20 mEq by mouth 2 times daily   propranolol (INDERAL) 20 MG tablet   Yes No   Sig: Take 1 tablet (20 mg) by mouth 2 times daily   risperiDONE (RISPERDAL) 0.25 MG tablet   Yes Yes   Sig: Take 0.25 mg by mouth   topiramate (TOPAMAX) 100 MG tablet  Care Giver No No   Sig: Take 1 tablet (100 mg) by mouth 3 times daily   traMADol (ULTRAM) 50 MG tablet   Yes No   Sig: Take 50 mg by mouth every 6 hours as needed Not to exceed 100 mg in 24 hours   traZODone HCl (OLEPTRO) 150 MG 24 hr tablet   Yes No   Sig: Take 150 mg by mouth nightly as needed      Facility-Administered Medications: None     Allergies   Allergies   Allergen Reactions     Amoxicillin Shortness Of Breath     Bee Venom Anaphylaxis     Bees Shortness Of Breath     Cefadroxil Shortness Of Breath, Other (See Comments) and Unknown     Tolerated ceftriaxone on 9/24/2018, tolerated cefuroxime on 9/25/2018       Contrast Dye Shortness Of Breath     Diagnostic X-Ray Materials Difficulty breathing     No Clinical Screening - See Comments Hives     PERFUMES, SOAPS   bees     Penicillins Shortness Of Breath and Other (See Comments)     Other reaction(s): Angioedema  throat swelling  throat swelling  throat swelling  throat swelling       Prednisone Shortness Of Breath     Cheese Nausea and Vomiting     PERFUMES, SOAPS   bees     Chlorpromazine Unknown     falls     Codeine Unknown     Codeine Sulfate GI Disturbance     Food      PN: MILK, DAIRY     Lactase-Lactobacillus GI Disturbance     MILK, DAIRY     Simvastatin Other (See Comments)     PN: affects her urination     Sulfa Drugs GI Disturbance and Unknown     Other reaction(s): *Unknown - Pt Doesn't Remember     Valproic Acid Other (See Comments)     Increased ammonia levels     Latex  Rash     bandaids-rash  bandaids-rash         Physical Exam   Vital Signs: Temp: 98.2  F (36.8  C) Temp src: Oral BP: 118/57 Pulse: 89   Resp: 18 SpO2: 99 % O2 Device: Oxymask Oxygen Delivery: 3 LPM  Weight: 259 lbs 0 oz    General Appearance: disheveled, ill and uncomfortable appearing, writhing in bed, making purposeful movements  Eyes: EOMI  HEENT: atraumatic, normocephalic, moist mucous membranes  Respiratory: exam limited - patient not cooperative  Cardiovascular: exam limited - patient not cooperative  GI: exam limited - patient not cooperative  Lymph/Hematologic: deferred  Genitourinary: normal female external genitalia without obvious redness or discharge  Skin: no apparent lesions, rashes, erythema, or areas of skin break down  Musculoskeletal: Back Exam: No midline warmth, tenderness or deformity. There is R CVA tenderness to palpation (chronic, unchanged).  Neurologic: awake, alert, oriented to person and place. Moving all 4 extremities. 5/5 strength bilateral upper and lower extremities. Able to move herself up and down the bed and lift herself up to a seated position  Psychiatric: frequently alternating between agitated and dysphoric mood, intermittently having linear thoughts and answering questions appropriately, per nursing home documentation this is her baseline.    Data   Data reviewed today: I reviewed all medications, new labs and imaging results over the last 24 hours.     EKG ordered, pending.     Recent Labs   Lab 03/10/21  1135   WBC 12.3*   HGB 11.6*   MCV 91         POTASSIUM 4.0   CHLORIDE 105   CO2 27   BUN 16   CR 0.71   ANIONGAP 6   KILLIAN 9.9   *   ALBUMIN 3.3*   PROTTOTAL 7.7   BILITOTAL 0.6   ALKPHOS 104   ALT 32   AST 16     Most Recent 3 BMP's:  Recent Labs   Lab Test 03/10/21  1135 03/05/15  0741 03/04/15  0810    139 142   POTASSIUM 4.0 3.8 3.6   CHLORIDE 105 108 111*   CO2 27 25 23   BUN 16 18 15   CR 0.71 0.74 0.70   ANIONGAP 6 6 8   KILLIAN 9.9 9.4 9.2   GLC  132* 92 87     Most Recent Urinalysis:  Recent Labs   Lab Test 03/10/21  1234   COLOR Yellow   APPEARANCE Slightly Cloudy   URINEGLC Negative   URINEBILI Negative   URINEKETONE Negative   SG 1.020   UBLD Moderate*   URINEPH 6.0   PROTEIN 50*   NITRITE Negative   LEUKEST Large*   RBCU 7*   WBCU >182*     Recent Results (from the past 24 hour(s))   XR Chest Port 1 View    Narrative    Portable chest    INDICATION: Hypoxic, confusion    COMPARISON: None    FINDINGS: Heart size is possibly mildly enlarged although the left  heart border is indistinct comment obscured by patchy opacities in the  left lower lung. Right shoulder prosthesis noted.      Impression    IMPRESSION: Left lower lung patchy opacifications, which may represent  infection more focal edema/atelectasis. Right shoulder prosthesis.    EVER MACK MD   CT Lumbar Spine w/o Contrast    Narrative    CT LUMBAR SPINE W/O CONTRAST 3/10/2021 2:35 PM    History: Low back pain, trauma.  ICD-10:    Comparison: Same day CT thoracic spine.    Technique: Using multidetector thin collimation helical acquisition  technique, axial, coronal and sagittal CT images through the lumbar  spine were obtained without intravenous contrast.     Findings: There are 5 lumbar type vertebrae. Hypoplastic ribs at T12.  Degenerative grade 1 anterolisthesis of L4 on L5. Mild loss of disc  height at L4-5. Additional multilevel degenerative changes with disc  bulges, endplate osteophytic spurring, and facet hypertrophy. At least  moderate spinal canal stenosis and moderate bilateral neural foraminal  stenosis at L4-5.    The visualized adjacent paraspinous tissues are grossly within normal  limits.    Partially evaluated nonobstructive inferior left renal stone.      Impression    Impression:  1. No acute fracture or traumatic subluxation.  2. Multilevel lumbar spondylosis, most pronounced at L4-5 where there  is at least moderate spinal canal stenosis and moderate  bilateral  neural foraminal stenosis.    MANUEL HERMAN MD   CT Head w/o Contrast    Narrative    CT HEAD W/O CONTRAST 3/10/2021 2:35 PM    History: Mental status change, unknown cause     Additional information from the electronic medical record: Multiple  sclerosis.  ICD-10:    Comparison: Brain MRI 11/8/2005.    Technique: Using multidetector thin collimation helical acquisition  technique, axial, coronal and sagittal CT images from the skull base  to the vertex were obtained without intravenous contrast.    Findings: There is no intracranial hemorrhage, mass effect, or midline  shift. Gray/white matter differentiation in both cerebral hemispheres  is preserved. Moderate generalized parenchymal volume loss and  ventriculomegaly, slightly progressed since 2005. Several foci of  white matter hypoattenuation in the supraventricular white matter of  both cerebral hemispheres, also unchanged. The basal cisterns are  clear.    The bony calvaria and the bones of the skull base are normal. Minimal  paranasal sinus mucosal thickening. Mastoid air cells are clear.     Right shoulder arthroplasty partially evaluated on the topograms.      Impression    Impression:  1. No acute intracranial pathology.  2. Progressive volume loss since 2005. Persistent ventriculomegaly.  3. Nonspecific white matter changes consistent with history of  multiple sclerosis.    MANUEL HERMAN MD   CT Cervical Spine w/o Contrast    Narrative    CT CERVICAL SPINE W/O CONTRAST 3/10/2021 2:36 PM    Provided History: Neck trauma (Age >= 65y)    Comparison: CT cervical spine from an outside institution 2/24/2016.    Technique: Using multidetector thin collimation helical acquisition  technique, axial, coronal and sagittal CT images through the cervical  spine were obtained without intravenous contrast.     Findings:  Beam hardening artifact related to body habitus and patient position  degrades image quality. The cervical vertebrae are normally  aligned.  Straightening of cervical lordosis. No acute fracture or subluxation.  No prevertebral edema.    Multilevel degenerative changes with loss of intervertebral disc  height, endplate osteophytic spurring, uncinate hypertrophy, and facet  hypertrophy. Multilevel mild to moderate spinal canal stenosis.  Moderate neural foraminal stenosis on the left at C3-4.    No abnormality of the paraspinous soft tissues.    Right shoulder arthroplasty partially evaluated on the topograms.      Impression    Impression:   1. No acute fracture or traumatic subluxation.  2. Multilevel cervical spondylosis.    MANUEL HERMAN MD   CT Thoracic Spine w/o Contrast    Narrative    CT THORACIC SPINE W/O CONTRAST 3/10/2021 2:36 PM    Provided History: Back trauma, no prior imaging (Age >= 16y)   ICD-10:    Comparison: Same day CT cervical and lumbar spine.     Technique: Using multidetector thin collimation helical acquisition  technique, axial, sagittal and coronal 3 mm thickness CT  reconstructions were obtained through the thoracic spine without  intravenous contrast.  Images were viewed in bone and soft tissue  windows.    Findings:  Thoracic spine alignment is within normal limits. There is no evidence  of fracture or significant prevertebral soft tissue swelling.  Multilevel mild loss of intervertebral disc height. Few scattered  Schmorl's node deformities. Additional multilevel degenerative changes  with endplate osteophytic spurring including bridging syndesmophytes.  Bilateral facet hypertrophy. No high-grade spinal canal or neural  foraminal stenosis.     Nonobstructive inferior left renal stone measuring up to 5 mm.      Impression    Impression:  1. No acute fracture or traumatic subluxation.  2. Multilevel thoracic spondylosis.    MANUEL HERMAN MD

## 2021-03-10 NOTE — ED TRIAGE NOTES
Pt arrived via EMS from her nursing home. Staffed called EMS this morning after they found her down. Staff helped her up around 0835 and thought she fell around 0825, EMS called around 0915. Staff noted posturing and nystagmus and confusion, but no history of seizure. Pt is diaphoretic. Nursing home has tested urine which has shown significant bacteria but not started on antibiotics. Sister is POA.

## 2021-03-10 NOTE — PHARMACY-ADMISSION MEDICATION HISTORY
3/11/21 Addendum; Initially (3/10/21) only received odd pages of facilities MAR, addendum reflects complete MAR and updated from 3/10 note      Admission Medication History Completed by Pharmacy    See [facility] Admission Navigator for allergy information, preferred outpatient pharmacy, prior to admission medications and immunization status.     Medication History Sources:    MAR from [facility] ([phone])    Changes made to PTA medication list (reason):    Added:   o Propranolol  o Tramadol Q4H scheduled (in addition to prn already listed)  o Aspirin 81 mg  o Quetiapine 300 mg twice daily at 0800 and 2000 ( in addition to 50 mg at 1200 and 1600 already listed)  o Risperidone 0.25 mg  o Milk of magnesia, ipratropium/albuterol, lactulose (prn medications)    Deleted: None    Changed:   o Clonazepam; 0.5 mg three times daily -> 0.5 mg four times daily    Additional Information:    None    Prior to Admission medications    Medication Sig Last Dose Taking? Auth Provider   aspirin 81 MG EC tablet Take 81 mg by mouth daily 3/10/2021 at 0800 Yes Unknown, Entered By History   baclofen (LIORESAL) 20 MG tablet Take 20 mg by mouth 3 times daily  3/10/2021 at 1200 Yes Reported, Patient   cholecalciferol 50 MCG (2000 UT) tablet Take 1 tablet by mouth daily  3/10/2021 at 0800 Yes Reported, Patient   clonazePAM (KLONOPIN) 0.5 MG tablet Take 0.5 mg by mouth 4 times daily  3/10/2021 at 1200 Yes Reported, Patient   hydrOXYzine (ATARAX) 25 MG tablet Take 25 mg by mouth every 4 hours as needed for itching or other (anxiety)   Yes Rafal Minaya MD   ibuprofen (ADVIL/MOTRIN) 200 MG tablet Take 200 mg by mouth 2 times daily .  Additionally, may take 600 mg Q6H as needed 3/10/2021 at 0800 Yes Reported, Patient   ipratropium - albuterol 0.5 mg/2.5 mg/3 mL (DUONEB) 0.5-2.5 (3) MG/3ML neb solution Take 1 vial by nebulization 4 times daily as needed for shortness of breath / dyspnea or wheezing  Yes Unknown, Entered By  History   lactulose (CHRONULAC) 10 GM/15ML solution Take 10 g by mouth 2 times daily as needed for constipation  Yes Unknown, Entered By History   magnesium hydroxide (MILK OF MAGNESIA) 400 MG/5ML suspension Take 30 mLs by mouth daily as needed for constipation or heartburn  Yes Unknown, Entered By History   omeprazole (PRILOSEC) 20 MG capsule Take 20 mg by mouth 2 times daily  3/10/2021 at 0800 Yes Reported, Patient   potassium chloride (KLOR-CON) 20 MEQ Packet Take 20 mEq by mouth daily  3/10/2021 at 0800 Yes Reported, Patient   propranolol (INDERAL) 20 MG tablet Take 20 mg by mouth 4 times daily 3/10/2021 at 1200 Yes Unknown, Entered By History   QUEtiapine (SEROQUEL) 300 MG tablet Take 300 mg by mouth 2 times daily At 0800 and 2000 3/10/2021 at 0800 Yes Unknown, Entered By History   QUEtiapine (SEROQUEL) 50 MG tablet Take 50 mg by mouth 2 times daily At 1200 and 1600 3/10/2021 at 1200 Yes Reported, Patient   risperiDONE (RISPERDAL) 0.25 MG tablet Take 0.25 mg by mouth daily 3/10/2021 at 0800 Yes Unknown, Entered By History   sennosides (SENOKOT) 8.6 MG tablet Take 1 tablet by mouth daily 3/9/2021 at 2000 Yes Unknown, Entered By History   traMADol (ULTRAM) 50 MG tablet Take 50 mg by mouth every 4 hours .  Additionally, may take 50 mg as needed up to three times daily 3/10/2021 at 1200 Yes Unknown, Entered By History   traZODone (DESYREL) 150 MG tablet Take 150 mg by mouth At Bedtime 3/9/2021 at 2000 Yes Unknown, Entered By History   acetaminophen (TYLENOL) 500 MG tablet Take 1-2 tablets (500-1,000 mg) by mouth every 6 hours as needed for mild pain 3/10/2021 at 1200  Mary Gold MD   EPINEPHrine (EPIPEN) 0.3 MG/0.3ML injection Inject 0.3 mg into the muscle once as needed for anaphylaxis   Unknown, Entered By History   estradiol (ESTRACE) 0.1 MG/GM vaginal cream Place 1 g vaginally   Reported, Patient   methyl salicylate-menthol (SATISH-GARCIA) OINT ointment Apply 50 g topically every 6 hours as needed    Mary Gold MD       Date completed: 03/11/21    Medication history completed by: Nima Christensen RP      ======================================================  ======================================================      Admission medication history interview status for the 3/10/2021 admission is complete. See Epic admission navigator for allergy information, pharmacy, prior to admission medications and immunization status.     Medication history interview sources:  MAR from [facility] ([phone])    Changes made to PTA medication list (reason)  Added: None  Deleted:   - baclofen, hydroxyzine (duplicate)  - alendronate, amantadine, aspirin 325 mg, ciprofloxacin, clotrimazole, divalproex, docusate, lactulose, lisinopril, loxapine, melatonin, miralax, propranolol, risperidone, topiramate (no on nursing home MAR)  Changed:   - Potassium chloride; from BID to daily  - omeprazole; from daily to BID  - clonazepam; from 1 mg TID to 0.5 mg QID  - quetiapine; from 100 mg HD to 50 mg BID at 1200 and 1600  - ibuprofen; added directions    Additional medication history information (including reliability of information, actions taken by pharmacist):None      Prior to Admission medications    Medication Sig Last Dose Taking? Auth Provider   baclofen (LIORESAL) 20 MG tablet Take 20 mg by mouth 3 times daily   Yes Reported, Patient   cholecalciferol 50 MCG (2000 UT) tablet Take 1 tablet by mouth daily   Yes Reported, Patient   clonazePAM (KLONOPIN) 0.5 MG tablet Take 0.5 mg by mouth 3 times daily   Yes Reported, Patient   hydrOXYzine (ATARAX) 25 MG tablet Take 1 tablet (25 mg) by mouth 2 times daily as needed for itching or other (anxiety)  Yes Rafal Minaya MD   ibuprofen (ADVIL/MOTRIN) 200 MG tablet Take 200 mg by mouth 2 times daily   Yes Reported, Patient   omeprazole (PRILOSEC) 20 MG capsule Take 20 mg by mouth 2 times daily   Yes Reported, Patient   potassium chloride (KLOR-CON) 20  MEQ Packet Take 20 mEq by mouth daily   Yes Reported, Patient   QUEtiapine Fumarate (SEROQUEL PO) Take 50 mg by mouth 2 times daily At 1200 and 1600  Yes Reported, Patient   sennosides (SENOKOT) 8.6 MG tablet Take 1 tablet by mouth daily  Yes Unknown, Entered By History   traMADol (ULTRAM) 50 MG tablet Take 50 mg by mouth 3 times daily as needed   Yes Reported, Patient   traZODone (DESYREL) 150 MG tablet Take 150 mg by mouth At Bedtime  Yes Unknown, Entered By History   acetaminophen (TYLENOL) 500 MG tablet Take 1-2 tablets (500-1,000 mg) by mouth every 6 hours as needed for mild pain   Mary Gold MD   EPINEPHrine (EPIPEN) 0.3 MG/0.3ML injection Inject 0.3 mg into the muscle once as needed for anaphylaxis   Unknown, Entered By History   estradiol (ESTRACE) 0.1 MG/GM vaginal cream Place 1 g vaginally   Reported, Patient   methyl salicylate-menthol (SATISH-GARCIA) OINT ointment Apply 50 g topically every 6 hours as needed   Mary Gold MD         Medication history completed by: Ricky Curry, TabathaD

## 2021-03-11 LAB
ANION GAP SERPL CALCULATED.3IONS-SCNC: 7 MMOL/L (ref 3–14)
BUN SERPL-MCNC: 16 MG/DL (ref 7–30)
CALCIUM SERPL-MCNC: 10.2 MG/DL (ref 8.5–10.1)
CHLORIDE SERPL-SCNC: 105 MMOL/L (ref 94–109)
CO2 SERPL-SCNC: 25 MMOL/L (ref 20–32)
CREAT SERPL-MCNC: 0.8 MG/DL (ref 0.52–1.04)
ERYTHROCYTE [DISTWIDTH] IN BLOOD BY AUTOMATED COUNT: 14.5 % (ref 10–15)
GFR SERPL CREATININE-BSD FRML MDRD: 77 ML/MIN/{1.73_M2}
GLUCOSE SERPL-MCNC: 138 MG/DL (ref 70–99)
HCT VFR BLD AUTO: 35.8 % (ref 35–47)
HGB BLD-MCNC: 11.2 G/DL (ref 11.7–15.7)
MAGNESIUM SERPL-MCNC: 2.1 MG/DL (ref 1.6–2.3)
MCH RBC QN AUTO: 27.7 PG (ref 26.5–33)
MCHC RBC AUTO-ENTMCNC: 31.3 G/DL (ref 31.5–36.5)
MCV RBC AUTO: 88 FL (ref 78–100)
PLATELET # BLD AUTO: 291 10E9/L (ref 150–450)
POTASSIUM SERPL-SCNC: 3.1 MMOL/L (ref 3.4–5.3)
RBC # BLD AUTO: 4.05 10E12/L (ref 3.8–5.2)
SODIUM SERPL-SCNC: 137 MMOL/L (ref 133–144)
WBC # BLD AUTO: 11.5 10E9/L (ref 4–11)

## 2021-03-11 PROCEDURE — 120N000002 HC R&B MED SURG/OB UMMC

## 2021-03-11 PROCEDURE — 83735 ASSAY OF MAGNESIUM: CPT | Performed by: PATHOLOGY

## 2021-03-11 PROCEDURE — 85027 COMPLETE CBC AUTOMATED: CPT | Performed by: PATHOLOGY

## 2021-03-11 PROCEDURE — 36415 COLL VENOUS BLD VENIPUNCTURE: CPT | Performed by: PATHOLOGY

## 2021-03-11 PROCEDURE — 99233 SBSQ HOSP IP/OBS HIGH 50: CPT | Mod: GC | Performed by: INTERNAL MEDICINE

## 2021-03-11 PROCEDURE — 250N000011 HC RX IP 250 OP 636: Performed by: STUDENT IN AN ORGANIZED HEALTH CARE EDUCATION/TRAINING PROGRAM

## 2021-03-11 PROCEDURE — 80048 BASIC METABOLIC PNL TOTAL CA: CPT | Performed by: PATHOLOGY

## 2021-03-11 PROCEDURE — 250N000013 HC RX MED GY IP 250 OP 250 PS 637: Performed by: STUDENT IN AN ORGANIZED HEALTH CARE EDUCATION/TRAINING PROGRAM

## 2021-03-11 PROCEDURE — 250N000011 HC RX IP 250 OP 636: Performed by: PATHOLOGY

## 2021-03-11 PROCEDURE — 250N000013 HC RX MED GY IP 250 OP 250 PS 637: Performed by: PATHOLOGY

## 2021-03-11 RX ORDER — LACTULOSE 10 G/15ML
10 SOLUTION ORAL 2 TIMES DAILY PRN
COMMUNITY

## 2021-03-11 RX ORDER — KETOROLAC TROMETHAMINE 15 MG/ML
15 INJECTION, SOLUTION INTRAMUSCULAR; INTRAVENOUS ONCE
Status: COMPLETED | OUTPATIENT
Start: 2021-03-11 | End: 2021-03-11

## 2021-03-11 RX ORDER — RISPERIDONE 0.25 MG/1
0.25 TABLET ORAL DAILY
COMMUNITY

## 2021-03-11 RX ORDER — QUETIAPINE FUMARATE 300 MG/1
300 TABLET, FILM COATED ORAL 2 TIMES DAILY
COMMUNITY

## 2021-03-11 RX ORDER — ASPIRIN 81 MG/1
81 TABLET ORAL DAILY
COMMUNITY

## 2021-03-11 RX ORDER — NALOXONE HYDROCHLORIDE 0.4 MG/ML
0.4 INJECTION, SOLUTION INTRAMUSCULAR; INTRAVENOUS; SUBCUTANEOUS
Status: DISCONTINUED | OUTPATIENT
Start: 2021-03-11 | End: 2021-03-15 | Stop reason: HOSPADM

## 2021-03-11 RX ORDER — TRAMADOL HYDROCHLORIDE 50 MG/1
50 TABLET ORAL EVERY 4 HOURS
Status: DISCONTINUED | OUTPATIENT
Start: 2021-03-11 | End: 2021-03-15 | Stop reason: HOSPADM

## 2021-03-11 RX ORDER — TRAMADOL HYDROCHLORIDE 50 MG/1
50 TABLET ORAL EVERY 4 HOURS
COMMUNITY

## 2021-03-11 RX ORDER — PROPRANOLOL HYDROCHLORIDE 20 MG/1
20 TABLET ORAL 4 TIMES DAILY
COMMUNITY

## 2021-03-11 RX ORDER — LIDOCAINE 4 G/G
1 PATCH TOPICAL EVERY 24 HOURS
Status: DISCONTINUED | OUTPATIENT
Start: 2021-03-11 | End: 2021-03-15 | Stop reason: HOSPADM

## 2021-03-11 RX ORDER — IPRATROPIUM BROMIDE AND ALBUTEROL SULFATE 2.5; .5 MG/3ML; MG/3ML
1 SOLUTION RESPIRATORY (INHALATION) 4 TIMES DAILY PRN
COMMUNITY

## 2021-03-11 RX ORDER — NALOXONE HYDROCHLORIDE 0.4 MG/ML
0.2 INJECTION, SOLUTION INTRAMUSCULAR; INTRAVENOUS; SUBCUTANEOUS
Status: DISCONTINUED | OUTPATIENT
Start: 2021-03-11 | End: 2021-03-15 | Stop reason: HOSPADM

## 2021-03-11 RX ADMIN — CLONAZEPAM 0.5 MG: 0.5 TABLET ORAL at 14:03

## 2021-03-11 RX ADMIN — POTASSIUM CHLORIDE 20 MEQ: 1.5 POWDER, FOR SOLUTION ORAL at 08:27

## 2021-03-11 RX ADMIN — TRAMADOL HYDROCHLORIDE 50 MG: 50 TABLET, FILM COATED ORAL at 22:15

## 2021-03-11 RX ADMIN — QUETIAPINE 50 MG: 50 TABLET ORAL at 11:50

## 2021-03-11 RX ADMIN — PHENAZOPYRIDINE HYDROCHLORIDE 100 MG: 100 TABLET, FILM COATED ORAL at 08:27

## 2021-03-11 RX ADMIN — LIDOCAINE 1 PATCH: 560 PATCH PERCUTANEOUS; TOPICAL; TRANSDERMAL at 04:57

## 2021-03-11 RX ADMIN — ACETAMINOPHEN 650 MG: 325 TABLET, FILM COATED ORAL at 08:27

## 2021-03-11 RX ADMIN — IBUPROFEN 200 MG: 200 TABLET, FILM COATED ORAL at 20:35

## 2021-03-11 RX ADMIN — ENOXAPARIN SODIUM 40 MG: 40 INJECTION SUBCUTANEOUS at 20:35

## 2021-03-11 RX ADMIN — Medication 50 MCG: at 08:27

## 2021-03-11 RX ADMIN — PHENAZOPYRIDINE HYDROCHLORIDE 100 MG: 100 TABLET, FILM COATED ORAL at 18:23

## 2021-03-11 RX ADMIN — TRAMADOL HYDROCHLORIDE 50 MG: 50 TABLET, FILM COATED ORAL at 02:44

## 2021-03-11 RX ADMIN — OMEPRAZOLE 20 MG: 20 CAPSULE, DELAYED RELEASE ORAL at 08:27

## 2021-03-11 RX ADMIN — CLONAZEPAM 0.5 MG: 0.5 TABLET ORAL at 08:27

## 2021-03-11 RX ADMIN — PHENAZOPYRIDINE HYDROCHLORIDE 100 MG: 100 TABLET, FILM COATED ORAL at 11:50

## 2021-03-11 RX ADMIN — BACLOFEN 20 MG: 20 TABLET ORAL at 20:35

## 2021-03-11 RX ADMIN — RISPERIDONE 0.25 MG: 0.25 TABLET, FILM COATED ORAL at 08:27

## 2021-03-11 RX ADMIN — HYDROXYZINE HYDROCHLORIDE 25 MG: 25 TABLET, FILM COATED ORAL at 17:18

## 2021-03-11 RX ADMIN — BACLOFEN 20 MG: 20 TABLET ORAL at 14:03

## 2021-03-11 RX ADMIN — ACETAMINOPHEN 650 MG: 325 TABLET, FILM COATED ORAL at 01:02

## 2021-03-11 RX ADMIN — TRAMADOL HYDROCHLORIDE 50 MG: 50 TABLET, FILM COATED ORAL at 18:23

## 2021-03-11 RX ADMIN — BACLOFEN 20 MG: 20 TABLET ORAL at 08:27

## 2021-03-11 RX ADMIN — LIDOCAINE 1 PATCH: 560 PATCH PERCUTANEOUS; TOPICAL; TRANSDERMAL at 09:54

## 2021-03-11 RX ADMIN — HYDROXYZINE HYDROCHLORIDE 25 MG: 25 TABLET, FILM COATED ORAL at 01:28

## 2021-03-11 RX ADMIN — QUETIAPINE 50 MG: 50 TABLET ORAL at 17:18

## 2021-03-11 RX ADMIN — OMEPRAZOLE 20 MG: 20 CAPSULE, DELAYED RELEASE ORAL at 20:35

## 2021-03-11 RX ADMIN — CLONAZEPAM 0.5 MG: 0.5 TABLET ORAL at 20:35

## 2021-03-11 RX ADMIN — TRAZODONE HYDROCHLORIDE 150 MG: 50 TABLET ORAL at 22:15

## 2021-03-11 RX ADMIN — ACETAMINOPHEN 650 MG: 325 TABLET, FILM COATED ORAL at 22:19

## 2021-03-11 RX ADMIN — CEFTRIAXONE SODIUM 2 G: 2 INJECTION, POWDER, FOR SOLUTION INTRAMUSCULAR; INTRAVENOUS at 20:35

## 2021-03-11 RX ADMIN — TRAMADOL HYDROCHLORIDE 50 MG: 50 TABLET, FILM COATED ORAL at 14:03

## 2021-03-11 RX ADMIN — KETOROLAC TROMETHAMINE 15 MG: 15 INJECTION, SOLUTION INTRAMUSCULAR; INTRAVENOUS at 04:57

## 2021-03-11 RX ADMIN — IBUPROFEN 200 MG: 200 TABLET, FILM COATED ORAL at 11:50

## 2021-03-11 ASSESSMENT — ACTIVITIES OF DAILY LIVING (ADL)
ADLS_ACUITY_SCORE: 16

## 2021-03-11 NOTE — PROGRESS NOTES
[facility]    Progress Note - [facility] Service        Date of Admission:  3/10/2021    Assessment & Plan       Shelley Greenwood is a 65 year old female admitted on 3/10/2021. She has a history of MS w/ neurogenic bladder, UTI, anxiety, bipolar I, GERD, HTN and is admitted for acute encephalopathy with UTI and PNA. Patient work-up consisting of UA c/w UTI and CXR with LLL infiltrate in addition to leukocytosis (12.3).     --- Changes/updates:----------------------------   - Start scheduled tramadol in addition to PRN (nursing home MAR reviewed)  - Await urine and blood culture results  - Continue ceftriaxone  - Patient's POA (sister) called and updated  ------------------------------------------------------------    #Acute Encephalopathy  #Multiple Sclerosis  #Bipolar type I  #Fall  Suspect acute change in mental status around the time of the fall related to concurrent UTI and pneumonia, although this could also represent exacerbation of psychiatric illness. Differential for encephalopathy is broad including delirium secondary to ongoing infections and/or pain, exacerbation of psychiatric illness, CVA, head trauma, seizure.  -abx as below  -continue psych medications including clonazepam, quetiapine, risperidone    #UTI  UA c/w UTI. Patient with R CVA tenderness, unclear whether new or chronic pain. Was started on levofloxacin in the ED. Per nursing home notes, pt has hx of frequent UTI and is treated with oral abx. Reviewed UCxs on file, prior multidrug resistant UTIs (Enterobacter cloacae 3/10/15 and E coli 3/7/17).   -check procal  -F/u UCx and susceptibilities  -Switch from levofloxacin to ceftriaxone 2g qd (E Coli UTI in 2017 was resistant to levofloxacin)    #Community Acquired Pneumonia vs. Aspiration Pneumonia  CXR with LLL infiltrate though unclear if CAP or from aspiration event. No supplemental oxygen requirement. S/p one dose of levofloxacin in the ED, switched  "to ceftriaxone on admission.   -continue IV ceftriaxone  -consider sputum culture if respiratory symptoms     Micro:   Levofloxacin: 3/10  Ceftriaxone: 3/10-current    #Concern for seizure  #Fever  Based on report by EMS/nursing home. Patient apparently with \"posturing\" and \"nystagmus\" following fall. Possible a febrile seizure (temp 103.2 in ED) or triggered by infection. Pt denies hx of seizure. Will monitor.   - Monitor for seizure activity  - Tylenol PRN seizure  - IV Ativan PRN seizure    #Seizure-like Episode  #Fever  Patient with fever initially and reported \"abnormal posturing\" and nystagmus after fall. No seizure like activity since arrival.  - Continue to monitor  - Abx as above    Chronic Problems  MS: patient uses an electronic chair for mobility  Neurogenic Bladder: related to her MS, will obtain bladder scan every shift, consider low dose prophylactic antibiotic at time of discharge  Constipation: continue senna       Diet: Regular Diet Adult    Fluids: PO intake, no IVF ordered  Lines: peripheral IV x 2 (R forearm, L hand)  DVT Prophylaxis: Enoxaparin (Lovenox) SQ  Carpio Catheter: not present  Code Status: Full Code           Disposition Plan   Expected discharge: 2 - 3 days, recommended to prior living arrangement once antibiotic plan established and mental status at baseline.  Entered: Raphael Duff 03/11/2021, 11:24 AM        The patient's care was discussed with the Attending Physician, Dr. Poly Hampton.    Raphael Duff  Medical Student Year 3    Agree with medical student note    Giuliana Farah MD MPH  Internal Medicine Resident  [address]  Please see sign in/sign out for up to date coverage information    ______________________________________________________________________    Interval History   Overnight, vital signs WNL. Patient reported some back pain and received Toradol overnight. This morning she continues to report " having spasm-like pain all over. Denies any new symptoms like headache, fever, or chills. Feels that her urinary symptoms are improving. Denies tremor or seizure activity. No SOB or cough.     Data reviewed today: I reviewed all medications, new labs and imaging results over the last 24 hours. I personally reviewed no images or EKG's today.    Physical Exam   Vital Signs: Temp: 98.5  F (36.9  C) Temp src: Axillary BP: 132/62 Pulse: 101   Resp: 20 SpO2: 96 % O2 Device: None (Room air)    Weight: 259 lbs 0 oz  General Appearance: Lying in hospital bed, tired appearing, conversant with staff, intermittently answering questions appropriately  Respiratory: deferred  Cardiovascular: deferred  GI: deferred  Skin: deferred   Other: Moving all 4 extremities, only wearing a diaper     Data   Recent Labs   Lab 03/11/21  0735 03/10/21  1135   WBC 11.5* 12.3*   HGB 11.2* 11.6*   MCV 88 91    285    138   POTASSIUM 3.1* 4.0   CHLORIDE 105 105   CO2 25 27   BUN 16 16   CR 0.80 0.71   ANIONGAP 7 6   KILLIAN 10.2* 9.9   * 132*   ALBUMIN  --  3.3*   PROTTOTAL  --  7.7   BILITOTAL  --  0.6   ALKPHOS  --  104   ALT  --  32   AST  --  16     Medications       baclofen  20 mg Oral TID     cefTRIAXone  2 g Intravenous Q24H     clonazePAM  0.5 mg Oral TID     enoxaparin ANTICOAGULANT  40 mg Subcutaneous Q24H     ibuprofen  200 mg Oral BID     lidocaine  1 patch Transdermal Q24H     lidocaine   Transdermal Q8H     omeprazole  20 mg Oral BID     phenazopyridine  100 mg Oral TID w/meals     potassium chloride  20 mEq Oral Daily     QUEtiapine  50 mg Oral BID     risperiDONE  0.25 mg Oral Daily     sennosides  1 tablet Oral Daily     traZODone  150 mg Oral At Bedtime     Vitamin D3  50 mcg Oral Daily

## 2021-03-11 NOTE — ED NOTES
[facility]   ED Nurse to Floor Handoff     Shelley Greenwood is a 65 year old female who speaks English and lives alone,  in a nursing home  They arrived in the ED by ambulance from home    ED Chief Complaint: Altered Mental Status and Fall    ED Dx;   Final diagnoses:   Pneumonia of left lower lobe due to infectious organism   UTI (urinary tract infection) with pyuria   Acute respiratory failure with hypoxia (H)         Needed?: No    Allergies:   Allergies   Allergen Reactions     Amoxicillin Shortness Of Breath     Bee Venom Anaphylaxis     Bees Shortness Of Breath     Cefadroxil Shortness Of Breath, Other (See Comments) and Unknown     Tolerated ceftriaxone on 9/24/2018, tolerated cefuroxime on 9/25/2018       Contrast Dye Shortness Of Breath     Diagnostic X-Ray Materials Difficulty breathing     No Clinical Screening - See Comments Hives     PERFUMES, SOAPS   bees     Penicillins Shortness Of Breath and Other (See Comments)     Other reaction(s): Angioedema  throat swelling  throat swelling  throat swelling  throat swelling       Prednisone Shortness Of Breath     Cheese Nausea and Vomiting     PERFUMES, SOAPS   bees     Chlorpromazine Unknown     falls     Codeine Unknown     Codeine Sulfate GI Disturbance     Food      PN: MILK, DAIRY     Lactase-Lactobacillus GI Disturbance     MILK, DAIRY     Simvastatin Other (See Comments)     PN: affects her urination     Sulfa Drugs GI Disturbance and Unknown     Other reaction(s): *Unknown - Pt Doesn't Remember     Valproic Acid Other (See Comments)     Increased ammonia levels     Latex Rash     bandaids-rash  bandaids-rash     .  Past Medical Hx:   Past Medical History:   Diagnosis Date     Bipolar 1 disorder (H)      Bipolar disorder (H)      Cerebral artery occlusion with cerebral infarction (H)      Cognitive dysfunction      Depressive disorder      Gastroesophageal reflux disease      Glaucoma suspect of both  eyes      Glaucoma suspect of right eye      Hypertension      Multiple scleroses      Multiple sclerosis (H)      Neuromuscular disorder (H)     ms 30 years     Nonsenile cataract      Obese       Baseline Mental status: cognitively impaired  Current Mental Status changes: other altered from baseline    Infection present or suspected this encounter: yes respiratory and urinary  Sepsis suspected: Yes  Isolation type: No active isolations  Patient tested for COVID 19 prior to admission: YES     Activity level - Baseline/Home:  Stand with assist x2  Activity Level - Current:   Total Care    Bariatric equipment needed?: No    In the ED these meds were given:   Medications   phenazopyridine (PYRIDIUM) tablet 100 mg (100 mg Oral Given 3/10/21 1833)   levofloxacin (LEVAQUIN) infusion 750 mg (0 mg Intravenous Stopped 3/10/21 1605)   OLANZapine zydis (zyPREXA) ODT tab 5 mg (5 mg Oral Given 3/10/21 1236)   OLANZapine zydis (zyPREXA) ODT tab 5 mg (5 mg Oral Given 3/10/21 1647)   clonazePAM (klonoPIN) tablet 1 mg (1 mg Oral Given 3/10/21 1751)   baclofen (LIORESAL) tablet 20 mg (20 mg Oral Given 3/10/21 1833)       Drips running?  No    Home pump  No    Current LDAs  Peripheral IV 03/10/21 Right;Posterior Hand (Active)   Site Assessment WDL 03/10/21 1012   Line Status Infusing 03/10/21 1012   Phlebitis Scale 0-->no symptoms 03/10/21 1012   Infiltration Scale 0 03/10/21 1012   Number of days: 0       Peripheral IV 03/10/21 Left Hand (Active)   Site Assessment WDL 03/10/21 1122   Line Status Saline locked 03/10/21 1122   Phlebitis Scale 0-->no symptoms 03/10/21 1122   Infiltration Scale 0 03/10/21 1122   Infiltration Site Treatment Method  None 03/10/21 1122   Number of days: 0       Peripheral IV 12/22/14 Right Lower forearm (Active)   Number of days: 2270       Incision/Surgical Site 10/22/18 Right Eye (Active)   Number of days: 870       Labs results:   Labs Ordered and Resulted from Time of ED Arrival Up to the Time of  Departure from the ED   CBC WITH PLATELETS DIFFERENTIAL - Abnormal; Notable for the following components:       Result Value    WBC 12.3 (*)     Hemoglobin 11.6 (*)     MCHC 31.3 (*)     Absolute Neutrophil 9.5 (*)     All other components within normal limits   COMPREHENSIVE METABOLIC PANEL - Abnormal; Notable for the following components:    Glucose 132 (*)     Albumin 3.3 (*)     All other components within normal limits   ROUTINE UA WITH MICROSCOPIC - Abnormal; Notable for the following components:    Blood Urine Moderate (*)     Protein Albumin Urine 50 (*)     Urobilinogen mg/dL 3.0 (*)     Leukocyte Esterase Urine Large (*)     WBC Urine >182 (*)     RBC Urine 7 (*)     WBC Clumps Present (*)     Bacteria Urine Many (*)     Yeast Urine Few (*)     Mucous Urine Present (*)     All other components within normal limits   GLUCOSE BY METER - Abnormal; Notable for the following components:    Glucose 120 (*)     All other components within normal limits   ISTAT  GASES LACTATE AUGUSTUS POCT - Abnormal; Notable for the following components:    PO2 Venous 50 (*)     All other components within normal limits   INFLUENZA A/B & SARS-COV2 PCR MULTIPLEX   PERIPHERAL IV CATHETER   NURSING DRAW AND HOLD   ISTAT CG4 GASES LACTATE AUGUSTUS NURSING POCT   URINE CULTURE AEROBIC BACTERIAL   BLOOD CULTURE   BLOOD CULTURE       Imaging Studies:   Recent Results (from the past 24 hour(s))   XR Chest Port 1 View    Narrative    Portable chest    INDICATION: Hypoxic, confusion    COMPARISON: None    FINDINGS: Heart size is possibly mildly enlarged although the left  heart border is indistinct comment obscured by patchy opacities in the  left lower lung. Right shoulder prosthesis noted.      Impression    IMPRESSION: Left lower lung patchy opacifications, which may represent  infection more focal edema/atelectasis. Right shoulder prosthesis.    EVER MACK MD   CT Lumbar Spine w/o Contrast    Narrative    CT LUMBAR SPINE W/O CONTRAST  3/10/2021 2:35 PM    History: Low back pain, trauma.  ICD-10:    Comparison: Same day CT thoracic spine.    Technique: Using multidetector thin collimation helical acquisition  technique, axial, coronal and sagittal CT images through the lumbar  spine were obtained without intravenous contrast.     Findings: There are 5 lumbar type vertebrae. Hypoplastic ribs at T12.  Degenerative grade 1 anterolisthesis of L4 on L5. Mild loss of disc  height at L4-5. Additional multilevel degenerative changes with disc  bulges, endplate osteophytic spurring, and facet hypertrophy. At least  moderate spinal canal stenosis and moderate bilateral neural foraminal  stenosis at L4-5.    The visualized adjacent paraspinous tissues are grossly within normal  limits.    Partially evaluated nonobstructive inferior left renal stone.      Impression    Impression:  1. No acute fracture or traumatic subluxation.  2. Multilevel lumbar spondylosis, most pronounced at L4-5 where there  is at least moderate spinal canal stenosis and moderate bilateral  neural foraminal stenosis.    MANUEL HERMAN MD   CT Head w/o Contrast    Narrative    CT HEAD W/O CONTRAST 3/10/2021 2:35 PM    History: Mental status change, unknown cause     Additional information from the electronic medical record: Multiple  sclerosis.  ICD-10:    Comparison: Brain MRI 11/8/2005.    Technique: Using multidetector thin collimation helical acquisition  technique, axial, coronal and sagittal CT images from the skull base  to the vertex were obtained without intravenous contrast.    Findings: There is no intracranial hemorrhage, mass effect, or midline  shift. Gray/white matter differentiation in both cerebral hemispheres  is preserved. Moderate generalized parenchymal volume loss and  ventriculomegaly, slightly progressed since 2005. Several foci of  white matter hypoattenuation in the supraventricular white matter of  both cerebral hemispheres, also unchanged. The basal cisterns  are  clear.    The bony calvaria and the bones of the skull base are normal. Minimal  paranasal sinus mucosal thickening. Mastoid air cells are clear.     Right shoulder arthroplasty partially evaluated on the topograms.      Impression    Impression:  1. No acute intracranial pathology.  2. Progressive volume loss since 2005. Persistent ventriculomegaly.  3. Nonspecific white matter changes consistent with history of  multiple sclerosis.    MANUEL HERMAN MD   CT Cervical Spine w/o Contrast    Narrative    CT CERVICAL SPINE W/O CONTRAST 3/10/2021 2:36 PM    Provided History: Neck trauma (Age >= 65y)    Comparison: CT cervical spine from an outside institution 2/24/2016.    Technique: Using multidetector thin collimation helical acquisition  technique, axial, coronal and sagittal CT images through the cervical  spine were obtained without intravenous contrast.     Findings:  Beam hardening artifact related to body habitus and patient position  degrades image quality. The cervical vertebrae are normally aligned.  Straightening of cervical lordosis. No acute fracture or subluxation.  No prevertebral edema.    Multilevel degenerative changes with loss of intervertebral disc  height, endplate osteophytic spurring, uncinate hypertrophy, and facet  hypertrophy. Multilevel mild to moderate spinal canal stenosis.  Moderate neural foraminal stenosis on the left at C3-4.    No abnormality of the paraspinous soft tissues.    Right shoulder arthroplasty partially evaluated on the topograms.      Impression    Impression:   1. No acute fracture or traumatic subluxation.  2. Multilevel cervical spondylosis.    MANUEL HERMAN MD   CT Thoracic Spine w/o Contrast    Narrative    CT THORACIC SPINE W/O CONTRAST 3/10/2021 2:36 PM    Provided History: Back trauma, no prior imaging (Age >= 16y)   ICD-10:    Comparison: Same day CT cervical and lumbar spine.     Technique: Using multidetector thin collimation helical  "acquisition  technique, axial, sagittal and coronal 3 mm thickness CT  reconstructions were obtained through the thoracic spine without  intravenous contrast.  Images were viewed in bone and soft tissue  windows.    Findings:  Thoracic spine alignment is within normal limits. There is no evidence  of fracture or significant prevertebral soft tissue swelling.  Multilevel mild loss of intervertebral disc height. Few scattered  Schmorl's node deformities. Additional multilevel degenerative changes  with endplate osteophytic spurring including bridging syndesmophytes.  Bilateral facet hypertrophy. No high-grade spinal canal or neural  foraminal stenosis.     Nonobstructive inferior left renal stone measuring up to 5 mm.      Impression    Impression:  1. No acute fracture or traumatic subluxation.  2. Multilevel thoracic spondylosis.    MANUEL HERMAN MD       Recent vital signs:   /45   Pulse 114   Temp 103.2  F (39.6  C) (Rectal)   Resp 18   Ht 1.702 m (5' 7\")   Wt 117.5 kg (259 lb)   SpO2 92%   BMI 40.57 kg/m      [place] Coma Scale Score: 14 (03/10/21 1704)       Cardiac Rhythm:   Pt needs tele? No  Skin/wound Issues: Pt will not wear brief and will only use bed pan or just urinate in the bed. Concerns for future skin breakdown.     Code Status: Full Code    Pain control: fair    Nausea control: pt had none    Abnormal labs/tests/findings requiring intervention: see EPIC    Family present during ED course? No, but spoke with sister, Jaz, who is the sister's POA and makes all medical and financial decisions for the pt.   Family Comments/Social Situation comments: Sister is concerned about pt's chronic UTI's and pneumonia. Pt has been reporting difficulty swallowing to her. She also states that the nursing home staff voiced concerns about possible mini strokes and she said that the pt has had these in the past. All medical decisions and any changes to pt's long term care must go through her.     Tasks " needing completion: None    Saida Goodwin, RN    [phone] [facility]

## 2021-03-11 NOTE — PHARMACY
Please see addended pharmacy medication history note from 3/10/21 for updated medication history information.    Ricky Curry, TabathaD

## 2021-03-11 NOTE — PROGRESS NOTES
Care Management Follow Up    Length of Stay (days): 1    Expected Discharge Date:  Unknown     Concerns to be Addressed:  discharge planning     Patient plan of care discussed at interdisciplinary rounds: Yes    Anticipated Discharge Disposition:  Return to [facility]  [address] [place]  [address]  P: [phone]  F: [phone]     Anticipated Discharge Services:  N/A  Anticipated Discharge DME:  N/A    Patient/family educated on Medicare website which has current facility and service quality ratings:  N/A  Education Provided on the Discharge Plan:  Unknown at this time.  Patient/Family in Agreement with the Plan:  Unknown at this time.    Referrals Placed by CM/SW: N/A   Private pay costs discussed: Not applicable    Additional Information:  BEATRIS spoke with Merry in admissions @ [facility] [phone]. She confirmed that pt is from LTC and has a bed hold. SW to fax updated medical records to facility when pt is nearing medical clearance.     SW following for return to LTC.    ESTEFANI Mchugh, LGSW   Acute Care   PH: [phone]  Pager: [phone]  [facility]

## 2021-03-12 ENCOUNTER — DOCUMENTATION ONLY (OUTPATIENT)
Dept: OTHER | Facility: CLINIC | Age: 66
End: 2021-03-12

## 2021-03-12 LAB
ANION GAP SERPL CALCULATED.3IONS-SCNC: 10 MMOL/L (ref 3–14)
BUN SERPL-MCNC: 10 MG/DL (ref 7–30)
CALCIUM SERPL-MCNC: 10.1 MG/DL (ref 8.5–10.1)
CHLORIDE SERPL-SCNC: 105 MMOL/L (ref 94–109)
CO2 SERPL-SCNC: 21 MMOL/L (ref 20–32)
CREAT SERPL-MCNC: 0.59 MG/DL (ref 0.52–1.04)
ERYTHROCYTE [DISTWIDTH] IN BLOOD BY AUTOMATED COUNT: 14.2 % (ref 10–15)
GFR SERPL CREATININE-BSD FRML MDRD: >90 ML/MIN/{1.73_M2}
GLUCOSE SERPL-MCNC: 119 MG/DL (ref 70–99)
HCT VFR BLD AUTO: 36.7 % (ref 35–47)
HGB BLD-MCNC: 11.6 G/DL (ref 11.7–15.7)
MCH RBC QN AUTO: 28.2 PG (ref 26.5–33)
MCHC RBC AUTO-ENTMCNC: 31.6 G/DL (ref 31.5–36.5)
MCV RBC AUTO: 89 FL (ref 78–100)
PLATELET # BLD AUTO: 318 10E9/L (ref 150–450)
POTASSIUM SERPL-SCNC: 3.4 MMOL/L (ref 3.4–5.3)
RBC # BLD AUTO: 4.11 10E12/L (ref 3.8–5.2)
SODIUM SERPL-SCNC: 136 MMOL/L (ref 133–144)
WBC # BLD AUTO: 9.4 10E9/L (ref 4–11)

## 2021-03-12 PROCEDURE — 250N000011 HC RX IP 250 OP 636: Performed by: PATHOLOGY

## 2021-03-12 PROCEDURE — 80048 BASIC METABOLIC PNL TOTAL CA: CPT | Performed by: STUDENT IN AN ORGANIZED HEALTH CARE EDUCATION/TRAINING PROGRAM

## 2021-03-12 PROCEDURE — 36415 COLL VENOUS BLD VENIPUNCTURE: CPT | Performed by: STUDENT IN AN ORGANIZED HEALTH CARE EDUCATION/TRAINING PROGRAM

## 2021-03-12 PROCEDURE — 99233 SBSQ HOSP IP/OBS HIGH 50: CPT | Mod: GC | Performed by: INTERNAL MEDICINE

## 2021-03-12 PROCEDURE — 85027 COMPLETE CBC AUTOMATED: CPT | Performed by: STUDENT IN AN ORGANIZED HEALTH CARE EDUCATION/TRAINING PROGRAM

## 2021-03-12 PROCEDURE — 250N000013 HC RX MED GY IP 250 OP 250 PS 637: Performed by: PATHOLOGY

## 2021-03-12 PROCEDURE — 120N000002 HC R&B MED SURG/OB UMMC

## 2021-03-12 RX ADMIN — QUETIAPINE 50 MG: 50 TABLET ORAL at 17:09

## 2021-03-12 RX ADMIN — Medication 1 MG: at 20:50

## 2021-03-12 RX ADMIN — OMEPRAZOLE 20 MG: 20 CAPSULE, DELAYED RELEASE ORAL at 20:50

## 2021-03-12 RX ADMIN — IBUPROFEN 200 MG: 200 TABLET, FILM COATED ORAL at 08:58

## 2021-03-12 RX ADMIN — IBUPROFEN 200 MG: 200 TABLET, FILM COATED ORAL at 20:49

## 2021-03-12 RX ADMIN — BACLOFEN 20 MG: 20 TABLET ORAL at 08:58

## 2021-03-12 RX ADMIN — TRAZODONE HYDROCHLORIDE 150 MG: 50 TABLET ORAL at 22:00

## 2021-03-12 RX ADMIN — HYDROXYZINE HYDROCHLORIDE 25 MG: 25 TABLET, FILM COATED ORAL at 20:49

## 2021-03-12 RX ADMIN — CLONAZEPAM 0.5 MG: 0.5 TABLET ORAL at 20:47

## 2021-03-12 RX ADMIN — HYDROXYZINE HYDROCHLORIDE 25 MG: 25 TABLET, FILM COATED ORAL at 10:46

## 2021-03-12 RX ADMIN — PHENAZOPYRIDINE HYDROCHLORIDE 100 MG: 100 TABLET, FILM COATED ORAL at 08:58

## 2021-03-12 RX ADMIN — ENOXAPARIN SODIUM 40 MG: 40 INJECTION SUBCUTANEOUS at 20:47

## 2021-03-12 RX ADMIN — OMEPRAZOLE 20 MG: 20 CAPSULE, DELAYED RELEASE ORAL at 08:58

## 2021-03-12 RX ADMIN — CLONAZEPAM 0.5 MG: 0.5 TABLET ORAL at 08:58

## 2021-03-12 RX ADMIN — Medication 1 MG: at 03:27

## 2021-03-12 RX ADMIN — BACLOFEN 20 MG: 20 TABLET ORAL at 15:40

## 2021-03-12 RX ADMIN — TRAMADOL HYDROCHLORIDE 50 MG: 50 TABLET, FILM COATED ORAL at 09:00

## 2021-03-12 RX ADMIN — TRAMADOL HYDROCHLORIDE 50 MG: 50 TABLET, FILM COATED ORAL at 03:27

## 2021-03-12 RX ADMIN — POTASSIUM CHLORIDE 20 MEQ: 1.5 POWDER, FOR SOLUTION ORAL at 08:58

## 2021-03-12 RX ADMIN — PHENAZOPYRIDINE HYDROCHLORIDE 100 MG: 100 TABLET, FILM COATED ORAL at 13:03

## 2021-03-12 RX ADMIN — CEFTRIAXONE SODIUM 2 G: 2 INJECTION, POWDER, FOR SOLUTION INTRAMUSCULAR; INTRAVENOUS at 21:08

## 2021-03-12 RX ADMIN — QUETIAPINE 50 MG: 50 TABLET ORAL at 13:03

## 2021-03-12 RX ADMIN — TRAMADOL HYDROCHLORIDE 50 MG: 50 TABLET, FILM COATED ORAL at 20:47

## 2021-03-12 RX ADMIN — TRAMADOL HYDROCHLORIDE 50 MG: 50 TABLET, FILM COATED ORAL at 15:39

## 2021-03-12 RX ADMIN — Medication 50 MCG: at 08:58

## 2021-03-12 RX ADMIN — BACLOFEN 20 MG: 20 TABLET ORAL at 20:47

## 2021-03-12 RX ADMIN — CLONAZEPAM 0.5 MG: 0.5 TABLET ORAL at 15:40

## 2021-03-12 RX ADMIN — RISPERIDONE 0.25 MG: 0.25 TABLET, FILM COATED ORAL at 08:58

## 2021-03-12 ASSESSMENT — ACTIVITIES OF DAILY LIVING (ADL)
ADLS_ACUITY_SCORE: 18

## 2021-03-12 NOTE — PROGRESS NOTES
[facility]    Progress Note - [facility] 1 Service        Date of Admission:  3/10/2021    Assessment & Plan       Shelley Greenwood is a 65 year old female admitted on 3/10/2021. She has a history of MS w/ neurogenic bladder, UTI, anxiety, bipolar I, GERD, HTN and is admitted for acute encephalopathy with UTI and PNA. Patient work-up consisting of UA c/w UTI and CXR with LLL infiltrate now with resolved leukocytosis and improving encephalopathy.    --- Changes/updates:----------------------------   - Awaiting urine culture susceptibilities  - delirium precautions, stop overnight vital checks to help w/ encephalopathy  - PT, OT  ------------------------------------------------------------    #Acute Encephalopathy, improved  #Multiple Sclerosis  #Bipolar type I  #Fall  Suspect acute change in mental status around the time of the fall related to concurrent UTI and pneumonia, although this could also represent exacerbation of psychiatric illness or even uncontrolled pain related to her MS. CT head negative. Differential for encephalopathy is broad including delirium secondary to ongoing infections and/or pain, exacerbation of psychiatric illness, CVA, head trauma, seizure. Per d/w family, has had longterm problems with anxiety and paranoia. A&Ox4 now, encephalopathy improving/resolved.  -abx as below  -continue psych medications including clonazepam, quetiapine, risperidone  -delirium precautions, hold off on o/n VS checks to help w/ AMS    #UTI  UA c/w UTI. Patient with R CVA tenderness, unclear whether new or chronic pain. Was started on levofloxacin in the ED. Per nursing home notes, pt has hx of frequent UTI and is treated with oral abx. Reviewed UCxs on file, prior multidrug resistant UTIs (Enterobacter cloacae 3/10/15 and E coli 3/7/17).   -urine culture 3/10 growing GNR, awaiting speciation/susceptibilities  -continue IV ceftriaxone 2g qd (3/10-)(E Coli UTI in 2017 was  "resistant to levofloxacin)    #Community Acquired Pneumonia vs. Aspiration Pneumonia  CXR with LLL infiltrate though unclear if CAP or from aspiration event. No supplemental oxygen requirement. S/p one dose of levofloxacin in the ED, switched to ceftriaxone on admission. No clinical signs of PNA, no SOB/cough/etc, but is getting fair coverage w/ current regimen.  -continue IV ceftriaxone    Micro:    Levofloxacin: 3/10  Ceftriaxone: 3/10-current    #Concern for seizure  Based on report by EMS/nursing home. Patient apparently with \"posturing\" and \"nystagmus\" following fall. Possible a febrile seizure (temp 103.2 in ED) or triggered by infection. Pt denies hx of seizure. Will monitor.   - Monitor for seizure activity  - Tylenol PRN seizure  - IV Ativan PRN seizure    #Normocytic anemia  Very mild, Hb 11.6. CTM with CBC. No e/o bleeding.    Chronic Problems  MS: patient uses an electronic chair for mobility. PRN baclofen.  Neurogenic Bladder: related to her MS, will obtain bladder scan every shift, consider low dose prophylactic antibiotic at time of discharge  Constipation: continue senna       Diet: Regular Diet Adult    Fluids: PO intake  Lines: peripheral IV x 2 (R forearm, L hand)  DVT Prophylaxis: Enoxaparin (Lovenox) SQ  Carpio Catheter: not present  Code Status: Full Code           Disposition Plan   Expected discharge: 1-2d recommended to prior living arrangement once antibiotic plan established and mental status at baseline.  Entered: Raphael Duff 03/12/2021, 11:37 AM    -PT, OT     The patient's care was discussed with the Attending Physician, Dr. Poly Hampton.    Raphael Duff  Medical Student Year 3    Resident/Fellow Attestation   I, Lindsay Christie, was present with the medical/CARI student who participated in the service and in the documentation of the note.  I have verified the history and personally performed the physical exam and medical decision making.  I agree with the assessment and plan of care as " documented in the note.      Lindsay Christie MD  Internal Medicine Resident  [facility] [address] [place] [address]  Please see sign in/sign out for up to date coverage information    ______________________________________________________________________    Interval History   Overnight, vital signs WNL. Patient reporting continued diffuse spasms, for which she received Toradol and baclofen. Patient spitting up when eating while lying down due to reflux. Spitting up episodes witnessed by overnight staff. Patient denies any nausea or vomiting. Denies any new symptoms like headache, fever, or chills. Feels that her urinary symptoms are improving. Denies tremor or seizure activity. No SOB or cough. On further discussion with AM nurse, patient was able to take medications without spitting up. She has continued anxiety about being strong enough to transfer to her chair and be able to be discharged.    Further history obtained per sister, Vani, over the phone. She states Shelley has chronic anxiety and paranoia, and due to her MS progression has struggled with regulating her emotions. She also has had paranoid thoughts for many years - some of her typical foci of paranoia include beliefs that nursing home staff are harming her or poisoning her food.    Data reviewed today: I reviewed all medications, new labs and imaging results over the last 24 hours. I personally reviewed no images or EKG's today.    Physical Exam   Vital Signs: Temp: 98.1  F (36.7  C) Temp src: Oral BP: 118/54 Pulse: 112   Resp: 20 SpO2: 96 % O2 Device: None (Room air)    Weight: 259 lbs 0 oz  General Appearance: Lying in hospital bed, sleeping initially, conversant with staff, answering questions appropriately, only wearing a diaper  Respiratory: lung sounds clear to auscultation on the right throughout, lung sounds dull, faint on the left upper and lower lobes, no increased WOB or noted cough  Cardiovascular:  regular rate and rhythm, no appreciable murmurs, gallops or rubs  GI: normoactive bowel sounds, no tenderness to palpation  MSK: moving all extremities, no perispinous or midline spinous tenderness to palpation, no obvious deformity  Neuro: Awake, alert, oriented to person, place, and time. Equal  strength bilaterally.  Skin: chronic appearing callouses on bilateral feet with approx. 4cm diameter area of ecchymosis on the right sole of the foot, no areas of erythema or warmth of the feet  Psych: anxious, frequently repeating herself though able to answer questions, short attention span    Data   Recent Labs   Lab 03/12/21  0823 03/11/21  0735 03/10/21  1135   WBC 9.4 11.5* 12.3*   HGB 11.6* 11.2* 11.6*   MCV 89 88 91    291 285    137 138   POTASSIUM 3.4 3.1* 4.0   CHLORIDE 105 105 105   CO2 21 25 27   BUN 10 16 16   CR 0.59 0.80 0.71   ANIONGAP 10 7 6   KILLIAN 10.1 10.2* 9.9   * 138* 132*   ALBUMIN  --   --  3.3*   PROTTOTAL  --   --  7.7   BILITOTAL  --   --  0.6   ALKPHOS  --   --  104   ALT  --   --  32   AST  --   --  16     Medications       baclofen  20 mg Oral TID     cefTRIAXone  2 g Intravenous Q24H     clonazePAM  0.5 mg Oral TID     enoxaparin ANTICOAGULANT  40 mg Subcutaneous Q24H     ibuprofen  200 mg Oral BID     lidocaine  1 patch Transdermal Q24H     lidocaine   Transdermal Q8H     omeprazole  20 mg Oral BID     phenazopyridine  100 mg Oral TID w/meals     potassium chloride  20 mEq Oral Daily     QUEtiapine  50 mg Oral BID     risperiDONE  0.25 mg Oral Daily     sennosides  1 tablet Oral Daily     traMADol  50 mg Oral Q4H     traZODone  150 mg Oral At Bedtime     Vitamin D3  50 mcg Oral Daily

## 2021-03-13 ENCOUNTER — APPOINTMENT (OUTPATIENT)
Dept: PHYSICAL THERAPY | Facility: CLINIC | Age: 66
DRG: 178 | End: 2021-03-13
Payer: MEDICARE

## 2021-03-13 PROBLEM — F22 PARANOIA (H): Status: ACTIVE | Noted: 2017-11-14

## 2021-03-13 PROBLEM — Z96.619 STATUS POST TOTAL SHOULDER ARTHROPLASTY: Status: ACTIVE | Noted: 2017-05-11

## 2021-03-13 PROBLEM — N31.9 NEUROGENIC BLADDER: Status: ACTIVE | Noted: 2018-09-20

## 2021-03-13 PROBLEM — G89.29 CHRONIC RIGHT SHOULDER PAIN: Status: ACTIVE | Noted: 2017-03-22

## 2021-03-13 PROBLEM — Z98.42 S/P CATARACT SURGERY, LEFT: Status: ACTIVE | Noted: 2019-04-09

## 2021-03-13 PROBLEM — E66.01 MORBID OBESITY (H): Status: ACTIVE | Noted: 2018-09-20

## 2021-03-13 PROBLEM — M25.511 CHRONIC RIGHT SHOULDER PAIN: Status: ACTIVE | Noted: 2017-03-22

## 2021-03-13 LAB
ERYTHROCYTE [DISTWIDTH] IN BLOOD BY AUTOMATED COUNT: 14.5 % (ref 10–15)
HCT VFR BLD AUTO: 34.3 % (ref 35–47)
HGB BLD-MCNC: 10.7 G/DL (ref 11.7–15.7)
MCH RBC QN AUTO: 27.3 PG (ref 26.5–33)
MCHC RBC AUTO-ENTMCNC: 31.2 G/DL (ref 31.5–36.5)
MCV RBC AUTO: 88 FL (ref 78–100)
PLATELET # BLD AUTO: 351 10E9/L (ref 150–450)
RBC # BLD AUTO: 3.92 10E12/L (ref 3.8–5.2)
WBC # BLD AUTO: 8 10E9/L (ref 4–11)

## 2021-03-13 PROCEDURE — 99232 SBSQ HOSP IP/OBS MODERATE 35: CPT | Mod: GC | Performed by: INTERNAL MEDICINE

## 2021-03-13 PROCEDURE — 36415 COLL VENOUS BLD VENIPUNCTURE: CPT | Performed by: STUDENT IN AN ORGANIZED HEALTH CARE EDUCATION/TRAINING PROGRAM

## 2021-03-13 PROCEDURE — 250N000013 HC RX MED GY IP 250 OP 250 PS 637: Performed by: STUDENT IN AN ORGANIZED HEALTH CARE EDUCATION/TRAINING PROGRAM

## 2021-03-13 PROCEDURE — 85027 COMPLETE CBC AUTOMATED: CPT | Performed by: STUDENT IN AN ORGANIZED HEALTH CARE EDUCATION/TRAINING PROGRAM

## 2021-03-13 PROCEDURE — 99207 PR CDG-MDM COMPONENT: MEETS LOW - DOWN CODED: CPT | Performed by: INTERNAL MEDICINE

## 2021-03-13 PROCEDURE — 97162 PT EVAL MOD COMPLEX 30 MIN: CPT | Mod: GP | Performed by: REHABILITATION PRACTITIONER

## 2021-03-13 PROCEDURE — 250N000013 HC RX MED GY IP 250 OP 250 PS 637: Performed by: PATHOLOGY

## 2021-03-13 PROCEDURE — 120N000002 HC R&B MED SURG/OB UMMC

## 2021-03-13 PROCEDURE — 250N000011 HC RX IP 250 OP 636: Performed by: PATHOLOGY

## 2021-03-13 PROCEDURE — 97530 THERAPEUTIC ACTIVITIES: CPT | Mod: GP | Performed by: REHABILITATION PRACTITIONER

## 2021-03-13 RX ORDER — SULFAMETHOXAZOLE/TRIMETHOPRIM 800-160 MG
1 TABLET ORAL 2 TIMES DAILY
Status: DISCONTINUED | OUTPATIENT
Start: 2021-03-13 | End: 2021-03-15 | Stop reason: HOSPADM

## 2021-03-13 RX ORDER — SULFAMETHOXAZOLE/TRIMETHOPRIM 800-160 MG
1 TABLET ORAL ONCE
Status: COMPLETED | OUTPATIENT
Start: 2021-03-13 | End: 2021-03-13

## 2021-03-13 RX ORDER — BACLOFEN 10 MG/1
20 TABLET ORAL ONCE
Status: COMPLETED | OUTPATIENT
Start: 2021-03-13 | End: 2021-03-13

## 2021-03-13 RX ORDER — PROPRANOLOL HYDROCHLORIDE 20 MG/1
20 TABLET ORAL 4 TIMES DAILY
Status: DISCONTINUED | OUTPATIENT
Start: 2021-03-13 | End: 2021-03-15 | Stop reason: HOSPADM

## 2021-03-13 RX ORDER — SULFAMETHOXAZOLE/TRIMETHOPRIM 800-160 MG
1 TABLET ORAL ONCE
Status: DISCONTINUED | OUTPATIENT
Start: 2021-03-13 | End: 2021-03-13

## 2021-03-13 RX ORDER — POTASSIUM CHLORIDE 750 MG/1
40 TABLET, EXTENDED RELEASE ORAL ONCE
Status: DISCONTINUED | OUTPATIENT
Start: 2021-03-13 | End: 2021-03-13

## 2021-03-13 RX ADMIN — ENOXAPARIN SODIUM 40 MG: 40 INJECTION SUBCUTANEOUS at 21:42

## 2021-03-13 RX ADMIN — IBUPROFEN 200 MG: 200 TABLET, FILM COATED ORAL at 09:44

## 2021-03-13 RX ADMIN — CLONAZEPAM 0.5 MG: 0.5 TABLET ORAL at 14:54

## 2021-03-13 RX ADMIN — TRAMADOL HYDROCHLORIDE 50 MG: 50 TABLET, FILM COATED ORAL at 03:07

## 2021-03-13 RX ADMIN — OMEPRAZOLE 20 MG: 20 CAPSULE, DELAYED RELEASE ORAL at 09:44

## 2021-03-13 RX ADMIN — BENZOCAINE AND MENTHOL 1 LOZENGE: 15; 3.6 LOZENGE ORAL at 03:04

## 2021-03-13 RX ADMIN — IBUPROFEN 200 MG: 200 TABLET, FILM COATED ORAL at 20:20

## 2021-03-13 RX ADMIN — Medication 50 MCG: at 09:41

## 2021-03-13 RX ADMIN — PROPRANOLOL HYDROCHLORIDE 20 MG: 20 TABLET ORAL at 13:04

## 2021-03-13 RX ADMIN — QUETIAPINE 50 MG: 50 TABLET ORAL at 13:03

## 2021-03-13 RX ADMIN — PHENAZOPYRIDINE HYDROCHLORIDE 100 MG: 100 TABLET, FILM COATED ORAL at 13:04

## 2021-03-13 RX ADMIN — BACLOFEN 20 MG: 20 TABLET ORAL at 20:19

## 2021-03-13 RX ADMIN — CLONAZEPAM 0.5 MG: 0.5 TABLET ORAL at 20:18

## 2021-03-13 RX ADMIN — OMEPRAZOLE 20 MG: 20 CAPSULE, DELAYED RELEASE ORAL at 20:20

## 2021-03-13 RX ADMIN — TRAMADOL HYDROCHLORIDE 50 MG: 50 TABLET, FILM COATED ORAL at 09:45

## 2021-03-13 RX ADMIN — TRAMADOL HYDROCHLORIDE 50 MG: 50 TABLET, FILM COATED ORAL at 13:03

## 2021-03-13 RX ADMIN — CLONAZEPAM 0.5 MG: 0.5 TABLET ORAL at 09:40

## 2021-03-13 RX ADMIN — BACLOFEN 20 MG: 20 TABLET ORAL at 09:45

## 2021-03-13 RX ADMIN — SULFAMETHOXAZOLE AND TRIMETHOPRIM 1 TABLET: 800; 160 TABLET ORAL at 20:21

## 2021-03-13 RX ADMIN — POTASSIUM CHLORIDE 20 MEQ: 1.5 POWDER, FOR SOLUTION ORAL at 09:45

## 2021-03-13 RX ADMIN — LIDOCAINE 1 PATCH: 560 PATCH PERCUTANEOUS; TOPICAL; TRANSDERMAL at 15:15

## 2021-03-13 RX ADMIN — QUETIAPINE FUMARATE 300 MG: 200 TABLET ORAL at 09:39

## 2021-03-13 RX ADMIN — TRAMADOL HYDROCHLORIDE 50 MG: 50 TABLET, FILM COATED ORAL at 18:07

## 2021-03-13 RX ADMIN — BACLOFEN 20 MG: 10 TABLET ORAL at 12:14

## 2021-03-13 RX ADMIN — TRAZODONE HYDROCHLORIDE 150 MG: 50 TABLET ORAL at 21:43

## 2021-03-13 RX ADMIN — PHENAZOPYRIDINE HYDROCHLORIDE 100 MG: 100 TABLET, FILM COATED ORAL at 09:38

## 2021-03-13 RX ADMIN — PROPRANOLOL HYDROCHLORIDE 20 MG: 20 TABLET ORAL at 21:43

## 2021-03-13 RX ADMIN — TRAMADOL HYDROCHLORIDE 50 MG: 50 TABLET, FILM COATED ORAL at 20:20

## 2021-03-13 RX ADMIN — QUETIAPINE FUMARATE 300 MG: 200 TABLET ORAL at 20:19

## 2021-03-13 RX ADMIN — PROPRANOLOL HYDROCHLORIDE 20 MG: 20 TABLET ORAL at 09:41

## 2021-03-13 RX ADMIN — SULFAMETHOXAZOLE AND TRIMETHOPRIM 1 TABLET: 800; 160 TABLET ORAL at 12:14

## 2021-03-13 RX ADMIN — BACLOFEN 20 MG: 20 TABLET ORAL at 14:54

## 2021-03-13 RX ADMIN — PHENAZOPYRIDINE HYDROCHLORIDE 100 MG: 100 TABLET, FILM COATED ORAL at 18:07

## 2021-03-13 RX ADMIN — RISPERIDONE 0.25 MG: 0.25 TABLET, FILM COATED ORAL at 09:40

## 2021-03-13 ASSESSMENT — ACTIVITIES OF DAILY LIVING (ADL)
ADLS_ACUITY_SCORE: 19
ADLS_ACUITY_SCORE: 20

## 2021-03-13 NOTE — PROGRESS NOTES
"[facility]    Progress Note - Christiano 1 Service        Date of Admission:  3/10/2021    Assessment & Plan       Shelley Greenwood is a 65 year old female admitted on 3/10/2021. She has a history of MS w/ neurogenic bladder, UTI, anxiety, bipolar I, GERD, HTN and is admitted for acute encephalopathy. Found to have E. Coli UTI and likely LLL pneumonitis on workup. Improving with antibiotics.     --- Changes/updates:----------------------------   - PT and OT evals, goal for discharge is for patient to be able to transfer from bed to wheelchair which is her baseline  - Switch from IV abx to DS Bactrim BID x3d  - Unclear hx of sulfa allergy \"GI intolerance\" (?diarrhea), will administer first dose Bactrim here and observe  - plan for close PCP follow-up regarding frequent UTIs. Would benefit from closer communication between nursing home and PCP regarding UTIs diagnosed and treated at nursing home.   - One extra dose of baclofen today for muscle spasm  ------------------------------------------------------------    #Acute Encephalopathy, improved  #Multiple Sclerosis  #Bipolar type I  #Fall  Suspect acute change in mental status around the time of the fall related to UTI and pneumonia, although this could also represent exacerbation of psychiatric illness or even uncontrolled pain related to her MS. CT head negative. Per d/w family, has had longterm problems with anxiety and paranoia. A&Ox4 now, encephalopathy improving/resolved.  - continue PTA psych medications including clonazepam, quetiapine, risperidone  -delirium precautions    #E. Coli UTI  UA c/w UTI. Patient with R CVA tenderness, unclear whether new or chronic pain. Was started on levofloxacin in the ED. Per nursing home notes, pt has hx of frequent UTI and is treated with oral abx. Reviewed UCxs on file, prior multidrug resistant UTIs (Enterobacter cloacae 3/10/15 and E coli 3/7/17). UCx 3/10 grew out E COli.   - Switch " "from IV abx to DS Bactrim BID x3d  - Unclear hx of sulfa allergy \"GI intolerance\" (?diarrhea), will administer first dose Bactrim here and observe    Micro:   Levothyroxine: 3/10  Ceftriaxone: 3/10-3/13  Bactrim: 3/13-present        #Aspiration Pneumonitis   CXR with LLL infiltrate, initially with supplemental O2 requirement which quickly resolved. Favor aspiration pneumonitis. Pt remains afebrile, of supp O2, lungs clear on exam and no respiratory symptoms other than some increased phlegm, no cough.      #Encephalopathy, resolved   Patient encephalopathic in nursing home. Also some concern for seizure based on report by EMS/nursing home. Patient apparently with \"posturing\" and \"nystagmus\" following fall (not observed since admission). Possible a febrile seizure (temp 103.2 in ED) or triggered by infection. Pt denies hx of seizure. No seizure activity during admission and patient is back to baseline.     #Normocytic anemia  Mild normocytic anemia, Hgb 11-12 here with baseline 12-13. Possibly related to acute illness. No e/o bleeding.  - CTM with CBC  - Check TSH     Chronic Problems  MS: patient uses an electronic chair for mobility. PRN baclofen.  Neurogenic Bladder: related to her MS, will obtain bladder scan every shift, consider low dose prophylactic antibiotic at time of discharge  Constipation: continue senna       Diet: Regular Diet Adult    Fluids: PO intake  Lines: peripheral IV x 2 (R forearm, L hand)  DVT Prophylaxis: Enoxaparin (Lovenox) SQ  Carpio Catheter: not present  Code Status: Full Code           Disposition Plan   Expected discharge: today recommended to prior living arrangement once able to transfer as assessed by PT/OT.  Entered: Raphael Duff 03/13/2021, 8:31 AM    The patient's care was discussed with the Attending Physician, Dr. Poly Hampton.    Raphael Duff  Medical Student Year 3    Agree with note of Raphael Duff MS3, changes made where needed    Giuliana Farah   Internal Medicine " Resident  [facility]  Please see sign in/sign out for up to date coverage information    ______________________________________________________________________    Interval History   Overnight, patient reporting continued diffuse spasms, for which she received Toradol and baclofen. Patient spitting up when eating while lying down due to reflux. She reports new onset of sore throat and nausea starting at 0700 this morning. No vomiting. Denies any headache, fever, or chills. Feels that her urinary symptoms are improving. No SOB or cough. She has continued anxiety about being able to transfer to her chair and being discharged.    Data reviewed today: I reviewed all medications, new labs and imaging results over the last 24 hours. I personally reviewed no images or EKG's today.    Physical Exam   Vital Signs: Temp: 96.7  F (35.9  C) Temp src: Oral BP: (!) 145/65 Pulse: 110   Resp: 18 SpO2: 96 % O2 Device: None (Room air)    Weight: 259 lbs 0 oz  General Appearance: Lying in hospital bed, sleeping initially, conversant with staff, answering questions appropriately, only wearing a diaper  Respiratory: lung sounds clear to auscultation bilaterally, no increased WOB or noted cough  Cardiovascular: regular rate and rhythm, no appreciable murmurs, gallops or rubs  GI: normoactive bowel sounds. abd is soft, non distended. no tenderness to palpation though pt reporting nausea w/ palpation  MSK: moving all extremities, no perispinous or midline spinous tenderness to palpation, no obvious deformity  Neuro: Awake, alert, oriented to person, place, and time. Equal  strength bilaterally.  Skin: chronic scaling over bilateral feet  Psych: anxious affect, intermittently tearful, frequently repeating herself though able to answer questions, short attention span    Data   Recent Labs   Lab 03/12/21  0823 03/11/21  0735 03/10/21  1135   WBC 9.4 11.5* 12.3*   HGB 11.6*  11.2* 11.6*   MCV 89 88 91    291 285    137 138   POTASSIUM 3.4 3.1* 4.0   CHLORIDE 105 105 105   CO2 21 25 27   BUN 10 16 16   CR 0.59 0.80 0.71   ANIONGAP 10 7 6   KILLIAN 10.1 10.2* 9.9   * 138* 132*   ALBUMIN  --   --  3.3*   PROTTOTAL  --   --  7.7   BILITOTAL  --   --  0.6   ALKPHOS  --   --  104   ALT  --   --  32   AST  --   --  16     Medications       baclofen  20 mg Oral TID     cefTRIAXone  2 g Intravenous Q24H     clonazePAM  0.5 mg Oral TID     enoxaparin ANTICOAGULANT  40 mg Subcutaneous Q24H     ibuprofen  200 mg Oral BID     lidocaine  1 patch Transdermal Q24H     lidocaine   Transdermal Q8H     omeprazole  20 mg Oral BID     phenazopyridine  100 mg Oral TID w/meals     potassium chloride  20 mEq Oral Daily     QUEtiapine  50 mg Oral BID     risperiDONE  0.25 mg Oral Daily     sennosides  1 tablet Oral Daily     traMADol  50 mg Oral Q4H     traZODone  150 mg Oral At Bedtime     Vitamin D3  50 mcg Oral Daily

## 2021-03-13 NOTE — PROGRESS NOTES
Admission dx:  Acute respiratory failure with hypoxia (H) [J96.01]  UTI (urinary tract infection) with pyuria [N39.0]  Pneumonia of left lower lobe due to infectious organism [J18.9]    Vitals: VSS RA  Pain/PRN: Reports intermittent low back pain, tramadol given, repositioned, linen change. Denied lidocaine patch  Respiratory: Denies chest pain, cough, SOB. RA. Coarse lung sounds.   Neuros: A&O x4. Cooperative, intermittent anxiety-reassurance. Declines to wear gown.   GI/: Incontinent bladder x 2, perineal cares completed. Regular diet, fair appetite. Denies N&V but spits up after swallowing liquid. LBM 3/12. Reports new onset sore throat-cepacol lozenge given. Oral cares completed.   Cardiac: WDL, HTN   Skin/Wounds: Scale, slough bilateral feet-otc lotion applied. Bed bath given.     Lines: Pt request PIV be removed evening shift. Writer request IV nurse placement.   Activity: Assist x 1-2 bed mobility, encourage independent bed mobility as tolerated.     Plan: Continue to monitor. Discharge [facility].

## 2021-03-13 NOTE — PROGRESS NOTES
03/13/21 1205   Quick Adds   Type of Visit Initial PT Evaluation   Living Environment   People in home facility resident   Current Living Arrangements extended care facility   Home Accessibility wheelchair accessible   Self-Care   Usual Activity Tolerance fair   Current Activity Tolerance fair   Regular Exercise No   Equipment Currently Used at Home wheelchair, power   Disability/Function   Fall history within last six months yes   General Information   Onset of Illness/Injury or Date of Surgery 03/10/21   Referring Physician Kinsey Christie MD   Patient/Family Therapy Goals Statement (PT) return home   Pertinent History of Current Problem (include personal factors and/or comorbidities that impact the POC)  65 year old female admitted on 3/10/2021. She has a history of MS w/ neurogenic bladder, UTI, anxiety, bipolar I, GERD, HTN and is admitted for acute encephalopathy. Found to have E. Coli UTI and likely LLL pneumonitis on workup. Improving with antibiotics.    Existing Precautions/Restrictions fall   Cognition   Orientation Status (Cognition) person   Affect/Mental Status (Cognition) anxious   Follows Commands (Cognition) 75-90% accuracy;increased processing time needed;repetition of directions required   Behavioral Issues overwhelmed easily   Cognitive Status Comments memory impaired   Pain Assessment   Patient Currently in Pain No   Posture    Posture Forward head position;Protracted shoulders;Kyphosis   Range of Motion (ROM)   ROM Comment B ankle DF to ~5 degrees   Strength   Strength Comments BLE ~ 3/5 with mobility, but fatigues rapidly   Bed Mobility   Comment (Bed Mobility) Sup to sit with min A and rail to roll, mod A for trunk for sidelying to sitting with HOB at 20 degrees   Transfers   Transfer Safety Comments Pt bed to wc (mildly downhill transfer) with CGAx2, with UE support on bedrail and wc, cues for technique, safety and encouragement.   Gait/Stairs (Locomotion)   Comment (Gait/Stairs) No  stance or gait, uses power    Balance   Balance Comments Mod A for initial sitting EOB static balance   Muscle Tone   Muscle Tone Comments Pt c/o intermittent painful LUE and LLE muscle spasms   Clinical Impression   Criteria for Skilled Therapeutic Intervention yes, treatment indicated   PT Diagnosis (PT) impaired functional mobility   Influenced by the following impairments Decreased strength, balance, endurance, posture, increased fear of falling, anxiety, muscle spasms   Functional limitations due to impairments Up to modAx1 and CGA of 2nd for transfers   Clinical Presentation Evolving/Changing   Clinical Presentation Rationale PMH and clinical judgment   Clinical Decision Making (Complexity) moderate complexity   Therapy Frequency (PT) 3x/week   Predicted Duration of Therapy Intervention (days/wks) 2 wks   Planned Therapy Interventions (PT) balance training;bed mobility training;home exercise program;neuromuscular re-education;patient/family education;postural re-education;ROM (range of motion);strengthening;stretching;transfer training   Risk & Benefits of therapy have been explained evaluation/treatment results reviewed;care plan/treatment goals reviewed;risks/benefits reviewed;current/potential barriers reviewed;participants voiced agreement with care plan;participants included;patient   Clinical Impression Comments Pt prefers delay disch until tomorrow.   PT Discharge Planning    PT Discharge Recommendation (DC Rec) Long term care facility;home with home care physical therapy   PT Rationale for DC Rec Pt would benefit from follow up PT and LTC to progress LE strength/balance to progress transfers at her LTC.  Pt would benefit from bedrails at LTC and pt in agreement with this.  Pt reports she would prefer to wait until tomorrow to disch back to LTC.  She lists concern of LTC staff not being available to A her with transfers.   PT Brief overview of current status  CGAx2 (A of 2nd helps with pt's fear of  falling) for squat pivot bed <> wc with pt transferring to her L (may need to move wc to facilitate transfer to her L)   Total Evaluation Time   Total Evaluation Time (Minutes) 5

## 2021-03-14 PROCEDURE — 99207 PR CDG-MDM COMPONENT: MEETS LOW - DOWN CODED: CPT | Performed by: INTERNAL MEDICINE

## 2021-03-14 PROCEDURE — 250N000013 HC RX MED GY IP 250 OP 250 PS 637: Performed by: PATHOLOGY

## 2021-03-14 PROCEDURE — 99232 SBSQ HOSP IP/OBS MODERATE 35: CPT | Mod: GC | Performed by: INTERNAL MEDICINE

## 2021-03-14 PROCEDURE — 250N000011 HC RX IP 250 OP 636: Performed by: PATHOLOGY

## 2021-03-14 PROCEDURE — 120N000002 HC R&B MED SURG/OB UMMC

## 2021-03-14 PROCEDURE — 250N000013 HC RX MED GY IP 250 OP 250 PS 637: Performed by: STUDENT IN AN ORGANIZED HEALTH CARE EDUCATION/TRAINING PROGRAM

## 2021-03-14 RX ORDER — POTASSIUM CHLORIDE 750 MG/1
20 TABLET, EXTENDED RELEASE ORAL DAILY
Status: DISCONTINUED | OUTPATIENT
Start: 2021-03-14 | End: 2021-03-15 | Stop reason: HOSPADM

## 2021-03-14 RX ADMIN — QUETIAPINE 50 MG: 50 TABLET ORAL at 12:32

## 2021-03-14 RX ADMIN — TRAMADOL HYDROCHLORIDE 50 MG: 50 TABLET, FILM COATED ORAL at 03:57

## 2021-03-14 RX ADMIN — QUETIAPINE FUMARATE 300 MG: 200 TABLET ORAL at 09:12

## 2021-03-14 RX ADMIN — PROPRANOLOL HYDROCHLORIDE 20 MG: 20 TABLET ORAL at 09:14

## 2021-03-14 RX ADMIN — BACLOFEN 20 MG: 20 TABLET ORAL at 14:27

## 2021-03-14 RX ADMIN — QUETIAPINE 50 MG: 50 TABLET ORAL at 15:41

## 2021-03-14 RX ADMIN — PROPRANOLOL HYDROCHLORIDE 20 MG: 20 TABLET ORAL at 15:43

## 2021-03-14 RX ADMIN — TRAMADOL HYDROCHLORIDE 50 MG: 50 TABLET, FILM COATED ORAL at 09:14

## 2021-03-14 RX ADMIN — CLONAZEPAM 0.5 MG: 0.5 TABLET ORAL at 09:13

## 2021-03-14 RX ADMIN — SULFAMETHOXAZOLE AND TRIMETHOPRIM 1 TABLET: 800; 160 TABLET ORAL at 20:27

## 2021-03-14 RX ADMIN — OMEPRAZOLE 20 MG: 20 CAPSULE, DELAYED RELEASE ORAL at 20:27

## 2021-03-14 RX ADMIN — CLONAZEPAM 0.5 MG: 0.5 TABLET ORAL at 20:27

## 2021-03-14 RX ADMIN — TRAMADOL HYDROCHLORIDE 50 MG: 50 TABLET, FILM COATED ORAL at 17:48

## 2021-03-14 RX ADMIN — CLONAZEPAM 0.5 MG: 0.5 TABLET ORAL at 14:26

## 2021-03-14 RX ADMIN — OMEPRAZOLE 20 MG: 20 CAPSULE, DELAYED RELEASE ORAL at 09:13

## 2021-03-14 RX ADMIN — PHENAZOPYRIDINE HYDROCHLORIDE 100 MG: 100 TABLET, FILM COATED ORAL at 17:53

## 2021-03-14 RX ADMIN — ENOXAPARIN SODIUM 40 MG: 40 INJECTION SUBCUTANEOUS at 20:26

## 2021-03-14 RX ADMIN — TRAMADOL HYDROCHLORIDE 50 MG: 50 TABLET, FILM COATED ORAL at 20:28

## 2021-03-14 RX ADMIN — IBUPROFEN 200 MG: 200 TABLET, FILM COATED ORAL at 09:13

## 2021-03-14 RX ADMIN — POTASSIUM CHLORIDE 20 MEQ: 750 TABLET, EXTENDED RELEASE ORAL at 15:43

## 2021-03-14 RX ADMIN — PROPRANOLOL HYDROCHLORIDE 20 MG: 20 TABLET ORAL at 12:32

## 2021-03-14 RX ADMIN — PHENAZOPYRIDINE HYDROCHLORIDE 100 MG: 100 TABLET, FILM COATED ORAL at 09:13

## 2021-03-14 RX ADMIN — PROPRANOLOL HYDROCHLORIDE 20 MG: 20 TABLET ORAL at 20:26

## 2021-03-14 RX ADMIN — QUETIAPINE FUMARATE 300 MG: 200 TABLET ORAL at 20:27

## 2021-03-14 RX ADMIN — BACLOFEN 20 MG: 20 TABLET ORAL at 20:27

## 2021-03-14 RX ADMIN — BACLOFEN 20 MG: 20 TABLET ORAL at 09:14

## 2021-03-14 RX ADMIN — RISPERIDONE 0.25 MG: 0.25 TABLET, FILM COATED ORAL at 09:14

## 2021-03-14 RX ADMIN — PHENAZOPYRIDINE HYDROCHLORIDE 100 MG: 100 TABLET, FILM COATED ORAL at 12:32

## 2021-03-14 RX ADMIN — SULFAMETHOXAZOLE AND TRIMETHOPRIM 1 TABLET: 800; 160 TABLET ORAL at 09:12

## 2021-03-14 RX ADMIN — LIDOCAINE 1 PATCH: 560 PATCH PERCUTANEOUS; TOPICAL; TRANSDERMAL at 16:02

## 2021-03-14 RX ADMIN — Medication 50 MCG: at 09:13

## 2021-03-14 RX ADMIN — TRAMADOL HYDROCHLORIDE 50 MG: 50 TABLET, FILM COATED ORAL at 12:33

## 2021-03-14 RX ADMIN — IBUPROFEN 200 MG: 200 TABLET, FILM COATED ORAL at 20:27

## 2021-03-14 RX ADMIN — TRAMADOL HYDROCHLORIDE 50 MG: 50 TABLET, FILM COATED ORAL at 15:42

## 2021-03-14 ASSESSMENT — ACTIVITIES OF DAILY LIVING (ADL)
ADLS_ACUITY_SCORE: 19

## 2021-03-14 NOTE — PROGRESS NOTES
"Care Management Follow Up    Length of Stay (days): 4    Expected Discharge Date: 03/13/21     Concerns to be Addressed:   Pt needs transportation set up to get home.  Pt was scheduled for discharge today and that was postponed to Monday at 10:45 am.    Patient plan of care discussed at interdisciplinary rounds: discussed via phone with treatment team.    Anticipated Discharge Disposition:  Return to [facility]     Anticipated Discharge Services:  Transportation needs  Anticipated Discharge DME:  Pt will need a wheelchair accessible van and service that can bring a wheelchair.  Her electric wheelchair is in her home at the SNF.    Patient/family educated on Medicare website which has current facility and service quality ratings:  NA  Education Provided on the Discharge Plan:  NA  Patient/Family in Agreement with the Plan:  Yes per MD team.    Referrals Placed by CM/BEATRIS:  None  Private pay costs discussed: insurance costs SW called pt to explain that she will be receiving a letter of denial from [facility] as they were not able to find pt a wheelchair accessible ride for today.  Because of this for today, [facility] approved a ride with [facility] under trip # [id number].  Per representative Rafal, this is valid for today.  Since pt is now discharging tomorrow, weekday BEATRIS will need to call back to [facility] at [phone] and request a ride through her [place] plan with a \"wheelchair ramp ride\".  If this is not available again, [facility] is approved to provide the ride.    Additional Information:  BEATRIS called pt to update her on discharge planning for tomorrow.  BEATRIS connected with pt's sister at pt's request.  Explained to sister Vani that a ride is being held with [facility] for 10:45 am if [facility] does not have another provider that is available right away in the morning.      No other needs identified for today.      Cici Grey, LIANNE, LCSW  Weekend Adult Acute Care   3/14/2021    Searchble on [employer] " - search SOCIAL WORK - for text paging options    4A, 4C, 4E, 5A and 5B; pager [phone]  6A, 6B, 6C and 6D; pager [phone]  7A, 7B, 7C, 7D and 5C; pager [phone]  [facility] pager: [phone]     RNCC/Care Coordinator; job code [phone] or [phone]    For hours 1600 - midnight Pager: [phone]

## 2021-03-14 NOTE — PROGRESS NOTES
[facility]    Progress Note - Christiano 1 Service        Date of Admission:  3/10/2021    Assessment & Plan       Shelley Greenwood is a 65 year old female admitted on 3/10/2021. She has a history of MS w/ neurogenic bladder, UTI, anxiety, bipolar I, GERD, HTN and is admitted for acute encephalopathy. Found to have E. Coli UTI and likely LLL pneumonitis on workup. Improving with antibiotics.     --- Changes/updates:----------------------------   -patient continues to express concerns that she will not be able to do transfer at her facility and may lack staff to take care of her on the weekend. SW aware.  -per d/w her sister who is POA, desires to return to prior facility as feels she receives best care there however desires not to discharge on weekend day d/t shortstaffed->plan for discharge tomorrow 3/15  ------------------------------------------------------------    #Acute Encephalopathy, improved  #Multiple Sclerosis  #Bipolar type I  #Fall  Suspect acute change in mental status around the time of the fall related to UTI and pneumonia, although this could also represent exacerbation of psychiatric illness or even uncontrolled pain related to her MS. CT head negative. Per d/w family, has had longterm problems with anxiety and paranoia. A&Ox4 now, encephalopathy improving/resolved.  -continue PTA psych medications including clonazepam, quetiapine, risperidone  -delirium precautions    #E. Coli UTI  UA c/w UTI. Patient with R CVA tenderness, unclear whether new or chronic pain. Was started on levofloxacin in the ED. Per nursing home notes, pt has hx of frequent UTI and is treated with oral abx. Reviewed UCxs on file, prior multidrug resistant UTIs (Enterobacter cloacae 3/10/15 and E coli 3/7/17). UCx 3/10 grew out E COli.   - Switch from IV abx to DS Bactrim BID x3d to end 3/15.    Micro:   Levothyroxine: 3/10  Ceftriaxone: 3/10-3/13  Bactrim: 3/13-3/15        #Normocytic  "anemia  Mild normocytic anemia, Hgb 11-12 here with baseline 12-13. Possibly related to acute illness. No e/o bleeding.  - CTM with CBC  - TSH    #Discharge Planning  Throughout hospitalization patient expressing persistent concerns that she is below her baseline and will not able to do transfers her on her own. PT evaluated her 3/13 and advised she was safe to return to prior facility. Discussed her concerns again with her 3/14 as medically ready for discharge since 3/13, and continues to have concerns. SW following, aware. D/w her sister who is her POA who desires her to return to [facility] as she feels this is the best facility for her and she receives good care there, however agrees that weekend may not be best time as they are short-staffed on weekend and she needs extra help for now.  -plan to discharge tomorrow to prior facility, will communicate patient's concerns, facility confirms they have lift devices available for transfers   -keep encouraging patient independence today in anticipation of discharge tomr    Chronic Problems  MS: patient uses an electronic chair for mobility. PRN baclofen.  Neurogenic Bladder: related to her MS, will obtain bladder scan every shift, consider low dose prophylactic antibiotic at time of discharge  Constipation: continue senna    Resolved  #Aspiration Pneumonitis, resolved   CXR with LLL infiltrate, initially with supplemental O2 requirement which quickly resolved. Favor aspiration pneumonitis. Pt remains afebrile, of supp O2, lungs clear on exam and no respiratory symptoms other than some increased phlegm, no cough.      #Encephalopathy, resolved   Patient encephalopathic in nursing home. Also some concern for seizure based on report by EMS/nursing home. Patient apparently with \"posturing\" and \"nystagmus\" following fall (not observed since admission). Possible a febrile seizure (temp 103.2 in ED) or triggered by infection. Pt denies hx of seizure. No seizure activity " during admission and patient is back to baseline.      Diet: Regular Diet Adult    Fluids: PO intake  Lines: peripheral IV x 2 (R forearm, L hand)  DVT Prophylaxis: Enoxaparin (Lovenox) SQ  Carpio Catheter: not present  Code Status: Full Code           Disposition Plan   Expected discharge: tomorrowrecommended to prior living arrangement.  Entered: Lindsay Christie MD 03/14/2021, 11:41 AM    The patient's care was discussed with the Attending Physician, Dr. Poly Hampton.    Lindsay Christie MD  Internal Medicine Resident  [address]  Please see sign in/sign out for up to date coverage information    ______________________________________________________________________    Interval History   Overnight no acute events. VSS. Patient very anxious, often tearful, about going back to prior facility. She is only needing 1 person for transfers now per nursing staff who also states strength and independence improving. Patient is anxious about doing transfers and about not having bedrails and does not want to discharge back to her prior facility especially on the weekend when they are short-staffed.    Discussed her concerns extensively with her. Otherwise feels well, no new symptoms. Tolerating PO, good mentation.    Data reviewed today: I reviewed all medications, new labs and imaging results over the last 24 hours. I personally reviewed no images or EKG's today.    Physical Exam   Vital Signs: Temp: 97.5  F (36.4  C) Temp src: Axillary BP: 111/64 Pulse: 88   Resp: 20 SpO2: 92 % O2 Device: None (Room air)    Weight: 259 lbs 0 oz  General Appearance: Up in chair eating breakfast. Well appearing, alert.  Respiratory: lung sounds clear to auscultation bilaterally, no increased WOB or noted cough  Cardiovascular: regular rate and rhythm, no appreciable murmurs, gallops or rubs  GI: nontender to palpation, soft  MSK: moving all extremities, no perispinous or  midline spinous tenderness to palpation, no obvious deformity  Neuro: Awake, alert, oriented   Skin: chronic scaling over bilateral feet  Psych: anxious affect, frequently tearful then laughing, frequently repeating herself though able to answer questions    Data   Recent Labs   Lab 03/13/21  1356 03/12/21  0823 03/11/21  0735 03/10/21  1135   WBC 8.0 9.4 11.5* 12.3*   HGB 10.7* 11.6* 11.2* 11.6*   MCV 88 89 88 91    318 291 285   NA  --  136 137 138   POTASSIUM  --  3.4 3.1* 4.0   CHLORIDE  --  105 105 105   CO2  --  21 25 27   BUN  --  10 16 16   CR  --  0.59 0.80 0.71   ANIONGAP  --  10 7 6   KILLIAN  --  10.1 10.2* 9.9   GLC  --  119* 138* 132*   ALBUMIN  --   --   --  3.3*   PROTTOTAL  --   --   --  7.7   BILITOTAL  --   --   --  0.6   ALKPHOS  --   --   --  104   ALT  --   --   --  32   AST  --   --   --  16     Medications       baclofen  20 mg Oral TID     clonazePAM  0.5 mg Oral TID     enoxaparin ANTICOAGULANT  40 mg Subcutaneous Q24H     ibuprofen  200 mg Oral BID     lidocaine  1 patch Transdermal Q24H     lidocaine   Transdermal Q8H     omeprazole  20 mg Oral BID     phenazopyridine  100 mg Oral TID w/meals     potassium chloride  20 mEq Oral Daily     propranolol  20 mg Oral 4x Daily     QUEtiapine  300 mg Oral BID     QUEtiapine  50 mg Oral BID     risperiDONE  0.25 mg Oral Daily     sennosides  1 tablet Oral Daily     sulfamethoxazole-trimethoprim  1 tablet Oral BID     traMADol  50 mg Oral Q4H     traZODone  150 mg Oral At Bedtime     Vitamin D3  50 mcg Oral Daily

## 2021-03-15 ENCOUNTER — PATIENT OUTREACH (OUTPATIENT)
Dept: CARE COORDINATION | Facility: CLINIC | Age: 66
End: 2021-03-15

## 2021-03-15 VITALS
RESPIRATION RATE: 18 BRPM | BODY MASS INDEX: 40.65 KG/M2 | SYSTOLIC BLOOD PRESSURE: 115 MMHG | HEART RATE: 82 BPM | TEMPERATURE: 96.7 F | WEIGHT: 259 LBS | OXYGEN SATURATION: 96 % | HEIGHT: 67 IN | DIASTOLIC BLOOD PRESSURE: 80 MMHG

## 2021-03-15 LAB
LABORATORY COMMENT REPORT: NORMAL
SARS-COV-2 RNA RESP QL NAA+PROBE: NEGATIVE
SPECIMEN SOURCE: NORMAL
TSH SERPL DL<=0.005 MIU/L-ACNC: 0.63 MU/L (ref 0.4–4)

## 2021-03-15 PROCEDURE — 250N000013 HC RX MED GY IP 250 OP 250 PS 637: Performed by: STUDENT IN AN ORGANIZED HEALTH CARE EDUCATION/TRAINING PROGRAM

## 2021-03-15 PROCEDURE — U0003 INFECTIOUS AGENT DETECTION BY NUCLEIC ACID (DNA OR RNA); SEVERE ACUTE RESPIRATORY SYNDROME CORONAVIRUS 2 (SARS-COV-2) (CORONAVIRUS DISEASE [COVID-19]), AMPLIFIED PROBE TECHNIQUE, MAKING USE OF HIGH THROUGHPUT TECHNOLOGIES AS DESCRIBED BY CMS-2020-01-R: HCPCS | Performed by: PATHOLOGY

## 2021-03-15 PROCEDURE — 36415 COLL VENOUS BLD VENIPUNCTURE: CPT | Performed by: PATHOLOGY

## 2021-03-15 PROCEDURE — 99207 PR CDG-CODE INCORRECT PER BILLING BASED ON TIME: CPT | Performed by: INTERNAL MEDICINE

## 2021-03-15 PROCEDURE — 84443 ASSAY THYROID STIM HORMONE: CPT | Performed by: PATHOLOGY

## 2021-03-15 PROCEDURE — U0005 INFEC AGEN DETEC AMPLI PROBE: HCPCS | Performed by: PATHOLOGY

## 2021-03-15 PROCEDURE — 250N000013 HC RX MED GY IP 250 OP 250 PS 637: Performed by: PATHOLOGY

## 2021-03-15 PROCEDURE — 99238 HOSP IP/OBS DSCHRG MGMT 30/<: CPT | Mod: GC | Performed by: INTERNAL MEDICINE

## 2021-03-15 RX ORDER — SULFAMETHOXAZOLE/TRIMETHOPRIM 800-160 MG
1 TABLET ORAL ONCE
Qty: 1 TABLET | Refills: 0 | DISCHARGE
Start: 2021-03-15 | End: 2021-03-15

## 2021-03-15 RX ADMIN — PHENAZOPYRIDINE HYDROCHLORIDE 100 MG: 100 TABLET, FILM COATED ORAL at 08:45

## 2021-03-15 RX ADMIN — Medication 50 MCG: at 08:42

## 2021-03-15 RX ADMIN — TRAMADOL HYDROCHLORIDE 50 MG: 50 TABLET, FILM COATED ORAL at 03:11

## 2021-03-15 RX ADMIN — CLONAZEPAM 0.5 MG: 0.5 TABLET ORAL at 08:43

## 2021-03-15 RX ADMIN — SULFAMETHOXAZOLE AND TRIMETHOPRIM 1 TABLET: 800; 160 TABLET ORAL at 08:42

## 2021-03-15 RX ADMIN — POTASSIUM CHLORIDE 20 MEQ: 750 TABLET, EXTENDED RELEASE ORAL at 08:46

## 2021-03-15 RX ADMIN — TRAMADOL HYDROCHLORIDE 50 MG: 50 TABLET, FILM COATED ORAL at 08:39

## 2021-03-15 RX ADMIN — RISPERIDONE 0.25 MG: 0.25 TABLET, FILM COATED ORAL at 08:43

## 2021-03-15 RX ADMIN — OMEPRAZOLE 20 MG: 20 CAPSULE, DELAYED RELEASE ORAL at 08:45

## 2021-03-15 RX ADMIN — BACLOFEN 20 MG: 20 TABLET ORAL at 08:45

## 2021-03-15 RX ADMIN — LIDOCAINE 1 PATCH: 560 PATCH PERCUTANEOUS; TOPICAL; TRANSDERMAL at 05:10

## 2021-03-15 RX ADMIN — QUETIAPINE FUMARATE 300 MG: 200 TABLET ORAL at 08:44

## 2021-03-15 RX ADMIN — IBUPROFEN 200 MG: 200 TABLET, FILM COATED ORAL at 08:40

## 2021-03-15 RX ADMIN — PROPRANOLOL HYDROCHLORIDE 20 MG: 20 TABLET ORAL at 08:43

## 2021-03-15 ASSESSMENT — ACTIVITIES OF DAILY LIVING (ADL)
ADLS_ACUITY_SCORE: 19

## 2021-03-15 NOTE — PROGRESS NOTES
Pt has been alert and oriented X4, VSS, very anxious. Pt called this writer into room in the morning with multiple requests at once then pt was frustrated that all needs were not met at once. This writer spent a great deal of time calming pt down and took time to resolve one request at the time. Pt c/o shoulder pain. Morning med given in addition to Lidocaine patch already in place. Pt was aware that she will be discharged at 1045 with [facility] transport. Hesitant but allowed this writer to collect Covid sample. Breakfast provided. Will call facility for report and get pt ready for transport after breakfast.

## 2021-03-15 NOTE — PROGRESS NOTES
Report given to nurse at [facility]. Pt is dressed and ready for ride.

## 2021-03-15 NOTE — DISCHARGE SUMMARY
[facility]  Discharge Summary - Medicine & Pediatrics       Date of Admission:  3/10/2021  Date of Discharge:  3/15/2021  Discharging Provider: Dr. Poly Abdalla  Discharge Service: [facility] 1    Discharge Diagnoses   E. Coli UTI  Aspiration Pneumonitis, resolved   Encephalopathy, resolved   Weakness and deconditioning in setting of acute illness   Normocytic anemia  Multiple sclerosis  Neurogenic Bladder  Chronic constipation  Hypokalemia     Resolved  Aspiration Pneumonitis, resolved   CXR with LLL infiltrate, initially with supplemental O2 requirement which quickly resolved. Favor aspiration pneumonitis. Pt remains afebrile, of supp O2, lungs clear on exam and no respiratory symptoms other than some mild increased phlegm, no cough.       Encephalopathy, resolved   Follow-ups Needed After Discharge   PCP follow-up:  - Follow up below pending blood cultures (no growth x5 days and not expected to return positive)  - Recheck CBC, follow up anemia   - Consider starting patient on antibiotics prophylactic for UTI- unclear how frequently UTIs occurring at LTC     Unresulted Labs Ordered in the Past 30 Days of this Admission     Date and Time Order Name Status Description    3/10/2021 1244 Blood culture Preliminary     3/10/2021 1244 Blood culture Preliminary       These results will be followed up by the patient's primary care provider.     Discharge Disposition   Discharged to long-term care facility  Condition at discharge: Good  Patient ready to discharge to a skilled nursing facility as soon as possible in order to create capacity for patients related to the COVID-19 pandemic.    Hospital Course     E. Coli UTI  UA c/w UTI. Patient with R CVA tenderness, unclear whether new or chronic pain. Was started on levofloxacin in the ED. Per nursing home notes, pt has hx of frequent UTI and is treated with oral abx. Reviewed UCxs on file, prior multidrug resistant UTIs  (Enterobacter cloacae 3/10/15 and E coli 3/7/17). UCx 3/10 grew out E COli.   - Switch from IV abx to DS Bactrim BID x3d to end evening of 3/15.  - Sulfa antibiotics were taken off allergy list, as prior reaction was reported as diarrhea (not true allergy), and patient tolerated Bactrim well during inpatient stay.      Micro:   Levofloxacin: 3/10  Ceftriaxone: 3/10-3/13  Bactrim: 3/13-3/15            Weakness and deconditioning in setting of acute illness   Throughout hospitalization patient expressing persistent concerns that she is below her baseline and will not able to do transfers her on her own. PT evaluated her 3/13 and advised she was safe to return to prior facility. Discussed her concerns again with her 3/14 as medically ready for discharge since 3/13, and continues to have concerns. SW following, aware. D/w her sister who is her POA who desires her to return to [facility] as she feels this is the best facility for her and she receives good care there, however agrees that weekend may not be best time as they are short-staffed on weekend and she needs extra help for now.  -plan to discharge tomorrow to prior facility, will communicate patient's concerns, facility confirms they have lift devices available for transfers   - Recommend physical therapy at LTC x2 week course, 3x/week; focusing on balance training; bed mobility training; home exercise program; neuromuscular re-education; patient education ;postural re-education; ROM (range of motion); strengthening ;stretching; transfer training    Normocytic anemia  Mild normocytic anemia, Hgb 11-12 here which was close to baseline 12-13. Possibly related to acute illness. No e/o bleeding. TSH normal.  - Follow up with PCP     Hypokalemia  Replaced for K>4    Chronic Problems-  MS: patient uses an electronic chair for mobility. PRN baclofen.  Neurogenic Bladder: related to her MS  Constipation: continue senna     Resolved  Aspiration Pneumonitis, resolved  "  CXR with LLL infiltrate, initially with supplemental O2 requirement which quickly resolved. Favor aspiration pneumonitis. Pt remains afebrile, of supp O2, lungs clear on exam and no respiratory symptoms other than some mild increased phlegm, no cough.       Encephalopathy, resolved   Patient encephalopathic in nursing home. Also some concern for seizure based on report by EMS/nursing home. Patient apparently with \"posturing\" and \"nystagmus\" following fall (not observed since admission). Possible a febrile seizure (temp 103.2 in ED) or triggered by infection. Pt denies hx of seizure. No seizure activity during admission (or any type of abnormal movements as described above), and patient is back to baseline.     Consultations This Hospital Stay   MEDICATION HISTORY IP PHARMACY CONSULT  PHYSICAL THERAPY ADULT IP CONSULT  OCCUPATIONAL THERAPY ADULT IP CONSULT  VASCULAR ACCESS CARE ADULT IP CONSULT    Code Status   Full Code     The patient was discussed with Dr. MAY Farah MD MPH  IM resident, [facility] [address] [place] [address] [address] [address]  Phone: [phone]  ______________________________________________________________________    Physical Exam   Vital Signs: Temp: 96.7  F (35.9  C) Temp src: Axillary BP: 115/80 Pulse: 82   Resp: 18 SpO2: 96 % O2 Device: None (Room air)    Weight: 259 lbs 0 oz  General Appearance:  Up in chair eating breakfast. Well appearing, alert.  Respiratory: lung sounds clear to auscultation bilaterally, no increased WOB or noted cough  Cardiovascular: RRR, no appreciable murmurs, gallops or rubs  GI: nontender to palpation, soft  MSK: moving all extremities, no perispinous or midline spinous tenderness to palpation, no CVA tenderness  Neuro: Awake, alert, oriented   Skin: chronic scaling over bilateral feet  Psych: mildly anxious and tearful, improving with reassurance      Primary Care Physician   Physician No " Ref-Primary    Discharge Orders   No discharge procedures on file.    Significant Results and Procedures   Most Recent 3 CBC's:  Recent Labs   Lab Test 03/13/21  1356 03/12/21  0823 03/11/21  0735   WBC 8.0 9.4 11.5*   HGB 10.7* 11.6* 11.2*   MCV 88 89 88    318 291     Most Recent 6 Bacteria Isolates From Any Culture (See EPIC Reports for Culture Details):  Recent Labs   Lab Test 03/10/21  1250 03/10/21  1234 03/10/21  1121 03/02/15  1630   CULT No growth after 5 days >100,000 colonies/mL  Escherichia coli  *  Culture in progress No growth after 5 days >100,000 colonies/mL Enterobacter cloacae complex*     Discharge Medications   Current Discharge Medication List      CONTINUE these medications which have NOT CHANGED    Details   aspirin 81 MG EC tablet Take 81 mg by mouth daily      baclofen (LIORESAL) 20 MG tablet Take 20 mg by mouth 3 times daily       cholecalciferol 50 MCG (2000 UT) tablet Take 1 tablet by mouth daily       clonazePAM (KLONOPIN) 0.5 MG tablet Take 0.5 mg by mouth 4 times daily       hydrOXYzine (ATARAX) 25 MG tablet Take 25 mg by mouth every 4 hours as needed for itching or other (anxiety)   Qty: 120 tablet      ibuprofen (ADVIL/MOTRIN) 200 MG tablet Take 200 mg by mouth 2 times daily .  Additionally, may take 600 mg Q6H as needed      ipratropium - albuterol 0.5 mg/2.5 mg/3 mL (DUONEB) 0.5-2.5 (3) MG/3ML neb solution Take 1 vial by nebulization 4 times daily as needed for shortness of breath / dyspnea or wheezing      lactulose (CHRONULAC) 10 GM/15ML solution Take 10 g by mouth 2 times daily as needed for constipation      magnesium hydroxide (MILK OF MAGNESIA) 400 MG/5ML suspension Take 30 mLs by mouth daily as needed for constipation or heartburn      omeprazole (PRILOSEC) 20 MG capsule Take 20 mg by mouth 2 times daily       potassium chloride (KLOR-CON) 20 MEQ Packet Take 20 mEq by mouth daily       propranolol (INDERAL) 20 MG tablet Take 20 mg by mouth 4 times daily      !!  QUEtiapine (SEROQUEL) 300 MG tablet Take 300 mg by mouth 2 times daily At 0800 and 2000      !! QUEtiapine (SEROQUEL) 50 MG tablet Take 50 mg by mouth 2 times daily At 1200 and 1600      risperiDONE (RISPERDAL) 0.25 MG tablet Take 0.25 mg by mouth daily      sennosides (SENOKOT) 8.6 MG tablet Take 1 tablet by mouth daily      traMADol (ULTRAM) 50 MG tablet Take 50 mg by mouth every 4 hours .  Additionally, may take 50 mg as needed up to three times daily      traZODone (DESYREL) 150 MG tablet Take 150 mg by mouth At Bedtime      acetaminophen (TYLENOL) 500 MG tablet Take 1-2 tablets (500-1,000 mg) by mouth every 6 hours as needed for mild pain  Qty: 160 tablet, Refills: 0    Associated Diagnoses: Muscle pain      EPINEPHrine (EPIPEN) 0.3 MG/0.3ML injection Inject 0.3 mg into the muscle once as needed for anaphylaxis      estradiol (ESTRACE) 0.1 MG/GM vaginal cream Place 1 g vaginally      methyl salicylate-menthol (SATISH-GARCIA) OINT ointment Apply 50 g topically every 6 hours as needed  Qty: 1 Tube, Refills: 0    Associated Diagnoses: Muscle pain       !! - Potential duplicate medications found. Please discuss with provider.        Allergies   Allergies   Allergen Reactions     Amoxicillin Shortness Of Breath     Bee Venom Anaphylaxis     Bees Shortness Of Breath     Cefadroxil Shortness Of Breath, Other (See Comments) and Unknown     Tolerated ceftriaxone on 9/24/2018, tolerated cefuroxime on 9/25/2018       Contrast Dye Shortness Of Breath     Diagnostic X-Ray Materials Difficulty breathing     No Clinical Screening - See Comments Hives     PERFUMES, SOAPS   bees     Penicillins Shortness Of Breath and Other (See Comments)     Other reaction(s): Angioedema  throat swelling  throat swelling  throat swelling  throat swelling       Prednisone Shortness Of Breath     Chlorpromazine Unknown     falls     Codeine Unknown     Codeine Sulfate GI Disturbance     Simvastatin Other (See Comments)     PN: affects her urination      Valproic Acid Other (See Comments)     Increased ammonia levels     Latex Rash     bandaids-rash  bandaids-rash

## 2021-03-15 NOTE — PROGRESS NOTES
Admission dx:  Acute respiratory failure with hypoxia (H) [J96.01]  UTI (urinary tract infection) with pyuria [N39.0]  Pneumonia of left lower lobe due to infectious organism [J18.9]    Vitals: VSS RA. Afebrile.   Pain/PRN: Intermittent right anterior shoulder pain-tramadol, ice pack, repositioning.   Respiratory: Denies chest pain, cough, SOB. RA. Clear LS.   Neuros: A&O x4 with intermittent anxiety, tearful-redirection, deep breathing, dangling side of bed.   GI/: Incontinent bladder, dark orange urine. Regular diet. Encouraged increase water intake vs soda.   Cardiac: WDL  Lines: Pt has pulled out two PIV.   Skin/Wounds: Blanchable redness axillae, perineum. Brief, linen changed. Scale, cracked bilateral feet.   Activity: Assist x 1-2 bed mobility.     Plan: Discharge LTC 3/15 AM. TSH to be rechecked this AM as pt previously declined.

## 2021-03-15 NOTE — PROGRESS NOTES
"Care Management Follow Up  Care Management Discharge Note    Discharge Date: 03/15/21    Discharge Disposition:  Return to [facility]  [address] [place]  [address]  P: [phone]  F: [phone]      Discharge Services:  N/A    Discharge DME:  N/A  Discharge Transportation:  [employer] thru [facility] [place] arriving approximately 10:45 pickup from hospital- bringing pts power wheelchair to hospital.    Private pay costs discussed: transportation costs    PAS Confirmation Code:  N/A  Patient/family educated on Medicare website which has current facility and service quality ratings:  N/A    Education Provided on the Discharge Plan:  yes  Persons Notified of Discharge Plans: doctor, 5a nursing, pt, sister danish, facility  Patient/Family in Agreement with the Plan:  yes    Handoff Referral Completed: Pt does not have a PCP on file.     Additional Information:  BEATRIS called [facility] at [phone] and spoke w/ Kristi to determine if pt can get a [facility] ride through her [place] plan w/ a \"wheelchair ramp ride\" and service that can provide a wheelchair this am. Pt uses an electric w/c at baseline and it is located at SNF. BEATRIS inquired with [facility] rep to see if y prime ride could be setup for pt, and she reported that pt may have to pay out of pocket as [facility] is not a covered transport provider through [facility] (confirmed w/ [facility] supervisor). [facility] could submit a claim which may be picked up by [facility], however it is not guaranteed. [facility] reported they do not have a company that can provide a w/c for pt. Kristi arranged for [employer] to pickup pts w/c from facility and then pickup pt from hospital and then transport back to facility.Transport company will arrive at facility around 1015 and then to the hospital around 10:45a.    BEATRIS paged MD to confirm that pt is still med ready for discharge this am. Pt remains med ready per Dr. Armstrong.    BEATRIS faxed discharge orders to facility " and confirmed electric w/c pickup time w/ luis e at facility.    IMM done.    ESTEFANI Mchugh, [facility]  5A Acute Care   PH: [phone]  Pager: [phone]  [facility]

## 2021-03-15 NOTE — PROGRESS NOTES
[employer]  dropped pt's wc at 1100 and left the hospital. Charge nurse called transport company and another  was not dispatched to the hospital until 1315. Pt was finally picked up by transport around 1325.

## 2021-03-15 NOTE — PROGRESS NOTES
"./67 (BP Location: Right arm)   Pulse 84   Temp 97.7  F (36.5  C) (Oral)   Resp 24   Ht 1.702 m (5' 7\")   Wt 117.5 kg (259 lb)   SpO2 96%   BMI 40.57 kg/m      19:: Pt alert and oriented. Denies nausea. C/o intermittent spasm/pain, on sched meds. She has been pleasant. Incontinent of urine. Cont to monitor and follow poc.  "

## 2021-03-16 LAB
BACTERIA SPEC CULT: ABNORMAL
BACTERIA SPEC CULT: ABNORMAL
BACTERIA SPEC CULT: NO GROWTH
BACTERIA SPEC CULT: NO GROWTH
Lab: ABNORMAL
SPECIMEN SOURCE: ABNORMAL
SPECIMEN SOURCE: NORMAL
SPECIMEN SOURCE: NORMAL

## 2021-03-16 NOTE — PROGRESS NOTES
[facility]    ARCENIO FERNANDEZ CMA

## 2021-03-19 ENCOUNTER — RECORDS - HEALTHEAST (OUTPATIENT)
Dept: LAB | Facility: CLINIC | Age: 66
End: 2021-03-19

## 2021-03-22 LAB
ANION GAP SERPL CALCULATED.3IONS-SCNC: 9 MMOL/L (ref 5–18)
BASOPHILS # BLD AUTO: 0.1 THOU/UL (ref 0–0.2)
BASOPHILS NFR BLD AUTO: 1 % (ref 0–2)
BUN SERPL-MCNC: 8 MG/DL (ref 8–22)
CALCIUM SERPL-MCNC: 9.5 MG/DL (ref 8.5–10.5)
CHLORIDE BLD-SCNC: 106 MMOL/L (ref 98–107)
CO2 SERPL-SCNC: 21 MMOL/L (ref 22–31)
CREAT SERPL-MCNC: 0.68 MG/DL (ref 0.6–1.1)
EOSINOPHIL # BLD AUTO: 0.1 THOU/UL (ref 0–0.4)
EOSINOPHIL NFR BLD AUTO: 2 % (ref 0–6)
ERYTHROCYTE [DISTWIDTH] IN BLOOD BY AUTOMATED COUNT: 14.7 % (ref 11–14.5)
GFR SERPL CREATININE-BSD FRML MDRD: >60 ML/MIN/1.73M2
GLUCOSE BLD-MCNC: 88 MG/DL (ref 70–125)
HCT VFR BLD AUTO: 34.7 % (ref 35–47)
HGB BLD-MCNC: 10.7 G/DL (ref 12–16)
IMM GRANULOCYTES # BLD: 0.1 THOU/UL
IMM GRANULOCYTES NFR BLD: 1 %
LYMPHOCYTES # BLD AUTO: 2.7 THOU/UL (ref 0.8–4.4)
LYMPHOCYTES NFR BLD AUTO: 39 % (ref 20–40)
MCH RBC QN AUTO: 27.9 PG (ref 27–34)
MCHC RBC AUTO-ENTMCNC: 30.8 G/DL (ref 32–36)
MCV RBC AUTO: 90 FL (ref 80–100)
MONOCYTES # BLD AUTO: 0.6 THOU/UL (ref 0–0.9)
MONOCYTES NFR BLD AUTO: 8 % (ref 2–10)
NEUTROPHILS # BLD AUTO: 3.4 THOU/UL (ref 2–7.7)
NEUTROPHILS NFR BLD AUTO: 49 % (ref 50–70)
PLATELET # BLD AUTO: 294 THOU/UL (ref 140–440)
PMV BLD AUTO: 10.2 FL (ref 8.5–12.5)
POTASSIUM BLD-SCNC: 4.4 MMOL/L (ref 3.5–5)
RBC # BLD AUTO: 3.84 MILL/UL (ref 3.8–5.4)
SODIUM SERPL-SCNC: 136 MMOL/L (ref 136–145)
WBC: 6.9 THOU/UL (ref 4–11)

## 2021-03-29 ENCOUNTER — RECORDS - HEALTHEAST (OUTPATIENT)
Dept: LAB | Facility: CLINIC | Age: 66
End: 2021-03-29

## 2021-03-31 LAB
BACTERIA SPEC CULT: ABNORMAL
BACTERIA SPEC CULT: NORMAL

## 2021-04-16 ENCOUNTER — RECORDS - HEALTHEAST (OUTPATIENT)
Dept: LAB | Facility: CLINIC | Age: 66
End: 2021-04-16

## 2021-04-19 LAB — BACTERIA SPEC CULT: ABNORMAL

## 2021-05-10 ENCOUNTER — RECORDS - HEALTHEAST (OUTPATIENT)
Dept: LAB | Facility: CLINIC | Age: 66
End: 2021-05-10

## 2021-05-12 LAB — BACTERIA SPEC CULT: ABNORMAL

## 2021-06-02 ENCOUNTER — RECORDS - HEALTHEAST (OUTPATIENT)
Dept: LAB | Facility: CLINIC | Age: 66
End: 2021-06-02

## 2021-06-03 LAB
C TRACH DNA SPEC QL NAA+PROBE: NEGATIVE
N GONORRHOEA DNA SPEC QL NAA+PROBE: NEGATIVE
SPEC DESCRIPTION: NORMAL
SPECIMEN DESCRIPTION: NORMAL

## 2021-06-30 ENCOUNTER — OFFICE VISIT (OUTPATIENT)
Dept: OPHTHALMOLOGY | Facility: CLINIC | Age: 66
End: 2021-06-30
Payer: MEDICARE

## 2021-06-30 DIAGNOSIS — H26.493 BILATERAL POSTERIOR CAPSULAR OPACIFICATION: ICD-10-CM

## 2021-06-30 DIAGNOSIS — H40.003 GLAUCOMA SUSPECT OF BOTH EYES: ICD-10-CM

## 2021-06-30 DIAGNOSIS — G35 MULTIPLE SCLEROSIS, RELAPSING-REMITTING (H): ICD-10-CM

## 2021-06-30 DIAGNOSIS — H52.4 PRESBYOPIA OF BOTH EYES: ICD-10-CM

## 2021-06-30 DIAGNOSIS — Z01.00 EXAMINATION OF EYES AND VISION: Primary | ICD-10-CM

## 2021-06-30 DIAGNOSIS — H52.13 MYOPIA OF BOTH EYES: ICD-10-CM

## 2021-06-30 DIAGNOSIS — Z96.1 PSEUDOPHAKIA: ICD-10-CM

## 2021-06-30 PROCEDURE — 92014 COMPRE OPH EXAM EST PT 1/>: CPT | Performed by: STUDENT IN AN ORGANIZED HEALTH CARE EDUCATION/TRAINING PROGRAM

## 2021-06-30 PROCEDURE — 92015 DETERMINE REFRACTIVE STATE: CPT | Mod: GY | Performed by: STUDENT IN AN ORGANIZED HEALTH CARE EDUCATION/TRAINING PROGRAM

## 2021-06-30 ASSESSMENT — TONOMETRY
IOP_METHOD: TONOPEN
OS_IOP_MMHG: 13
OD_IOP_MMHG: 12

## 2021-06-30 ASSESSMENT — REFRACTION_WEARINGRX
OD_SPHERE: -3.25
OS_ADD: +2.75
OD_CYLINDER: +1.50
OD_AXIS: 060
OS_CYLINDER: SPHERE
OD_ADD: +2.75
OS_SPHERE: -0.75

## 2021-06-30 ASSESSMENT — REFRACTION_MANIFEST
OS_ADD: +2.50
OD_SPHERE: -2.50
OD_ADD: +2.50
OS_CYLINDER: SPHERE
OD_CYLINDER: SPHERE
OS_SPHERE: -1.50

## 2021-06-30 ASSESSMENT — VISUAL ACUITY
OD_CC: 20/80
OS_CC: 20/60
OS_CC: J2
OD_CC: J8
CORRECTION_TYPE: GLASSES
METHOD: SNELLEN - LINEAR

## 2021-06-30 ASSESSMENT — CONF VISUAL FIELD
OS_NORMAL: 1
OD_NORMAL: 1

## 2021-06-30 ASSESSMENT — CUP TO DISC RATIO
OS_RATIO: 0.3
OD_RATIO: 0.65

## 2021-06-30 ASSESSMENT — EXTERNAL EXAM - LEFT EYE: OS_EXAM: NORMAL

## 2021-06-30 ASSESSMENT — EXTERNAL EXAM - RIGHT EYE: OD_EXAM: NORMAL

## 2021-06-30 ASSESSMENT — SLIT LAMP EXAM - LIDS
COMMENTS: NORMAL
COMMENTS: NORMAL

## 2021-06-30 NOTE — PROGRESS NOTES
Current Eye Medicatons:  no     Subjective:  Comprehensive eye exam.   Pt reports that she fears she she is losing her eyesight in her left eye. She thinks she had cataract surgery in left eye but is unsure.     Objective:  See Ophthalmology Exam.       Assessment:  Shelley Greenwood is a 65 year old female who presents with:   Encounter Diagnoses   Name Primary?     Examination of eyes and vision      Myopia of both eyes      Presbyopia of both eyes        Pseudophakia - Both Eyes      Bilateral posterior capsular opacification Recommend YAG capsulotomy left eye.      Multiple sclerosis, relapsing-remitting (H)      Glaucoma suspect of both eyes Intraocular pressure 12/13 today.        Plan:  Return for YAG capsulotomy left eye (laser to help clear the vision in the left eye)    Cintia Orosco MD  [phone]

## 2021-06-30 NOTE — Clinical Note
6/30/2021         RE: Shelley Greenwood  [facility] [place] [address]        Dear Colleague,    Thank you for referring your patient, Shelley Greenwood, to the [facility]. Please see a copy of my visit note below.    No notes on file    Again, thank you for allowing me to participate in the care of your patient.        Sincerely,        Cintia Orosco MD

## 2021-06-30 NOTE — PATIENT INSTRUCTIONS
Return for YAG capsulotomy left eye (laser to help clear the vision in the left eye)    Cintia Orosco MD  [phone]

## 2021-07-02 ENCOUNTER — RECORDS - HEALTHEAST (OUTPATIENT)
Dept: LAB | Facility: CLINIC | Age: 66
End: 2021-07-02

## 2021-07-02 LAB
ALBUMIN UR-MCNC: NEGATIVE G/DL
APPEARANCE UR: ABNORMAL
BACTERIA #/AREA URNS HPF: ABNORMAL /[HPF]
BILIRUB UR QL STRIP: NEGATIVE
CAOX CRY #/AREA URNS HPF: ABNORMAL /[HPF]
COLOR UR AUTO: ABNORMAL
GLUCOSE UR STRIP-MCNC: NEGATIVE MG/DL
HGB UR QL STRIP: ABNORMAL
KETONES UR STRIP-MCNC: NEGATIVE MG/DL
LEUKOCYTE ESTERASE UR QL STRIP: ABNORMAL
MUCOUS THREADS #/AREA URNS LPF: PRESENT LPF
NITRATE UR QL: NEGATIVE
PH UR STRIP: 5.5 [PH] (ref 5–8)
RBC URINE: 2 HPF
SP GR UR STRIP: 1.02 (ref 1–1.03)
SQUAMOUS EPITHELIAL: 2 /HPF
UROBILINOGEN UR STRIP-ACNC: ABNORMAL
WBC URINE: 50 HPF

## 2021-07-05 LAB
BACTERIA SPEC CULT: ABNORMAL
BACTERIA SPEC CULT: ABNORMAL

## 2021-07-16 ENCOUNTER — LAB REQUISITION (OUTPATIENT)
Dept: LAB | Facility: CLINIC | Age: 66
End: 2021-07-16
Payer: MEDICARE

## 2021-07-16 DIAGNOSIS — R32 UNSPECIFIED URINARY INCONTINENCE: ICD-10-CM

## 2021-07-16 LAB
ALBUMIN UR-MCNC: NEGATIVE MG/DL
APPEARANCE UR: CLEAR
BILIRUB UR QL STRIP: NEGATIVE
COLOR UR AUTO: COLORLESS
GLUCOSE UR STRIP-MCNC: NEGATIVE MG/DL
HGB UR QL STRIP: NEGATIVE
KETONES UR STRIP-MCNC: NEGATIVE MG/DL
LEUKOCYTE ESTERASE UR QL STRIP: NEGATIVE
NITRATE UR QL: NEGATIVE
PH UR STRIP: 5.5 [PH] (ref 5–7)
SP GR UR STRIP: 1 (ref 1–1.03)
UROBILINOGEN UR STRIP-MCNC: <2 MG/DL

## 2021-07-16 PROCEDURE — 81003 URINALYSIS AUTO W/O SCOPE: CPT | Mod: ORL | Performed by: FAMILY MEDICINE

## 2021-07-16 PROCEDURE — 87086 URINE CULTURE/COLONY COUNT: CPT | Mod: ORL | Performed by: FAMILY MEDICINE

## 2021-07-19 LAB — BACTERIA UR CULT: ABNORMAL

## 2021-08-12 ENCOUNTER — LAB REQUISITION (OUTPATIENT)
Dept: LAB | Facility: CLINIC | Age: 66
End: 2021-08-12
Payer: MEDICARE

## 2021-08-12 DIAGNOSIS — N39.0 URINARY TRACT INFECTION, SITE NOT SPECIFIED: ICD-10-CM

## 2021-08-12 LAB
ALBUMIN UR-MCNC: NEGATIVE MG/DL
AMORPH CRY #/AREA URNS HPF: ABNORMAL /HPF
APPEARANCE UR: CLEAR
BACTERIA #/AREA URNS HPF: ABNORMAL /HPF
BILIRUB UR QL STRIP: NEGATIVE
COLOR UR AUTO: COLORLESS
GLUCOSE UR STRIP-MCNC: NEGATIVE MG/DL
HGB UR QL STRIP: NEGATIVE
KETONES UR STRIP-MCNC: NEGATIVE MG/DL
LEUKOCYTE ESTERASE UR QL STRIP: ABNORMAL
NITRATE UR QL: NEGATIVE
PH UR STRIP: 6 [PH] (ref 5–7)
RBC URINE: 1 /HPF
SP GR UR STRIP: 1 (ref 1–1.03)
SQUAMOUS EPITHELIAL: <1 /HPF
TRANSITIONAL EPI: <1 /HPF
UROBILINOGEN UR STRIP-MCNC: <2 MG/DL
WBC URINE: 10 /HPF

## 2021-08-12 PROCEDURE — 87086 URINE CULTURE/COLONY COUNT: CPT | Mod: ORL | Performed by: FAMILY MEDICINE

## 2021-08-12 PROCEDURE — 81001 URINALYSIS AUTO W/SCOPE: CPT | Mod: ORL | Performed by: FAMILY MEDICINE

## 2021-08-15 LAB — BACTERIA UR CULT: ABNORMAL

## 2021-09-15 ENCOUNTER — OFFICE VISIT (OUTPATIENT)
Dept: OPHTHALMOLOGY | Facility: CLINIC | Age: 66
End: 2021-09-15
Payer: MEDICARE

## 2021-09-15 DIAGNOSIS — G35 MULTIPLE SCLEROSIS, RELAPSING-REMITTING (H): ICD-10-CM

## 2021-09-15 DIAGNOSIS — H40.003 GLAUCOMA SUSPECT OF BOTH EYES: ICD-10-CM

## 2021-09-15 DIAGNOSIS — Z96.1 PSEUDOPHAKIA: Primary | ICD-10-CM

## 2021-09-15 DIAGNOSIS — H26.493 BILATERAL POSTERIOR CAPSULAR OPACIFICATION: ICD-10-CM

## 2021-09-15 PROCEDURE — 66821 AFTER CATARACT LASER SURGERY: CPT | Mod: LT | Performed by: STUDENT IN AN ORGANIZED HEALTH CARE EDUCATION/TRAINING PROGRAM

## 2021-09-15 ASSESSMENT — VISUAL ACUITY
OS_CC: 20/50
CORRECTION_TYPE: GLASSES
METHOD: SNELLEN - LINEAR
OD_CC: 20/40
OS_CC+: +1

## 2021-09-15 ASSESSMENT — TONOMETRY
OD_IOP_MMHG: X
OS_IOP_MMHG: 14
IOP_METHOD: ICARE
IOP_METHOD: ICARE
OS_IOP_MMHG: 12

## 2021-09-15 ASSESSMENT — EXTERNAL EXAM - RIGHT EYE: OD_EXAM: NORMAL

## 2021-09-15 ASSESSMENT — SLIT LAMP EXAM - LIDS
COMMENTS: NORMAL
COMMENTS: NORMAL

## 2021-09-15 ASSESSMENT — EXTERNAL EXAM - LEFT EYE: OS_EXAM: NORMAL

## 2021-09-15 NOTE — LETTER
9/15/2021         RE: Shelley Greenwood  [address] [place] [address]        Dear Colleague,    Thank you for referring your patient, Shelley Greenwood, to the [facility]. Please see a copy of my visit note below.     Current Eye Medications:  none     Subjective:  Here for yag laser left eye. Pt complains of vision being very blurry in left eye. Pt would like to go over consent form with MD.      Objective:  See Ophthalmology Exam.      Assessment:  Shelley Greenwood is a 66 year old female who presents with:   Encounter Diagnoses   Name Primary?     Pseudophakia - Both Eyes      Bilateral posterior capsular opacification YAG capsulotomy performed today left eye without complication.      Multiple sclerosis, relapsing-remitting (H)      Glaucoma suspect of both eyes        Plan:  You may notice more floaters for the next few days.  Return in 3 weeks for Intraocular pressure check left eye and YAG laser right eye    Cintia Orosco MD  [phone]              Again, thank you for allowing me to participate in the care of your patient.        Sincerely,        Cintia Orosco MD

## 2021-09-15 NOTE — PATIENT INSTRUCTIONS
You may notice more floaters for the next few days.  Return in 3 weeks for Intraocular pressure check left eye and YAG laser right eye    Cintia Orosco MD  [phone]

## 2021-09-15 NOTE — PROGRESS NOTES
Current Eye Medications:  none     Subjective:  Here for yag laser left eye. Pt complains of vision being very blurry in left eye. Pt would like to go over consent form with MD.      Objective:  See Ophthalmology Exam.      Assessment:  Shelley Greenwood is a 66 year old female who presents with:   Encounter Diagnoses   Name Primary?     Pseudophakia - Both Eyes      Bilateral posterior capsular opacification YAG capsulotomy performed today left eye without complication.      Multiple sclerosis, relapsing-remitting (H)      Glaucoma suspect of both eyes        Plan:  You may notice more floaters for the next few days.  Return in 3 weeks for Intraocular pressure check left eye and YAG laser right eye    Cintia Orosco MD  [phone]

## 2021-10-15 ENCOUNTER — OFFICE VISIT (OUTPATIENT)
Dept: OPHTHALMOLOGY | Facility: CLINIC | Age: 66
End: 2021-10-15
Payer: MEDICARE

## 2021-10-15 DIAGNOSIS — G35 MULTIPLE SCLEROSIS, RELAPSING-REMITTING (H): ICD-10-CM

## 2021-10-15 DIAGNOSIS — H26.491 RIGHT POSTERIOR CAPSULAR OPACIFICATION: Primary | ICD-10-CM

## 2021-10-15 DIAGNOSIS — Z96.1 PSEUDOPHAKIA: ICD-10-CM

## 2021-10-15 PROCEDURE — 99207 PR NO CHARGE LOS: CPT | Performed by: STUDENT IN AN ORGANIZED HEALTH CARE EDUCATION/TRAINING PROGRAM

## 2021-10-15 PROCEDURE — 66821 AFTER CATARACT LASER SURGERY: CPT | Mod: 79 | Performed by: STUDENT IN AN ORGANIZED HEALTH CARE EDUCATION/TRAINING PROGRAM

## 2021-10-15 ASSESSMENT — SLIT LAMP EXAM - LIDS
COMMENTS: NORMAL
COMMENTS: NORMAL

## 2021-10-15 ASSESSMENT — TONOMETRY
OS_IOP_MMHG: 12
OD_IOP_MMHG: 14
IOP_METHOD: ICARE
OD_IOP_MMHG: 18
IOP_METHOD: ICARE

## 2021-10-15 ASSESSMENT — EXTERNAL EXAM - RIGHT EYE: OD_EXAM: NORMAL

## 2021-10-15 ASSESSMENT — VISUAL ACUITY
OS_CC: 20/30
OS_CC+: -2
METHOD: SNELLEN - LINEAR
CORRECTION_TYPE: GLASSES
OD_CC: 20/30

## 2021-10-15 ASSESSMENT — EXTERNAL EXAM - LEFT EYE: OS_EXAM: NORMAL

## 2021-10-15 NOTE — LETTER
10/15/2021         RE: Shelley Greenwood  [address] [place] [address]        Dear Colleague,    Thank you for referring your patient, Shelley Greenwood, to the [facility]. Please see a copy of my visit note below.     Current Eye Medications:  None     Subjective:  Pt here for Yag cap right eye today. Vision is doing ok in the left eye after YAG laser at last visit.     Objective:  See Ophthalmology Exam.       Assessment:  Shelley Greenwood is a 66 year old female who presents with:   Encounter Diagnoses   Name Primary?     Right posterior capsular opacification YAG capsulotomy right eye performed today without complication.        Pseudophakia - Both Eyes s/p YAG cap OS Doing well after YAG cap left eye.      Multiple sclerosis, relapsing-remitting (H)        Plan:  You may notice more floaters for the next few days.  Return in 3 weeks for Intraocular pressure check and refraction.    Cintia Orosco MD  [phone]            Again, thank you for allowing me to participate in the care of your patient.        Sincerely,        Cintia Orosco MD

## 2021-10-15 NOTE — PATIENT INSTRUCTIONS
You may notice more floaters for the next few days.  Return in 3 weeks for Intraocular pressure check and refraction.    Cintia Orosco MD  [phone]

## 2021-10-15 NOTE — PROGRESS NOTES
Current Eye Medications:  None     Subjective:  Pt here for Yag cap right eye today. Vision is doing ok in the left eye after YAG laser at last visit.     Objective:  See Ophthalmology Exam.       Assessment:  Shelley Greenwood is a 66 year old female who presents with:   Encounter Diagnoses   Name Primary?     Right posterior capsular opacification YAG capsulotomy right eye performed today without complication.        Pseudophakia - Both Eyes s/p YAG cap OS Doing well after YAG cap left eye.      Multiple sclerosis, relapsing-remitting (H)        Plan:  You may notice more floaters for the next few days.  Return in 3 weeks for Intraocular pressure check and refraction.    Cintia Orosco MD  [phone]

## 2021-10-26 ENCOUNTER — LAB REQUISITION (OUTPATIENT)
Dept: LAB | Facility: CLINIC | Age: 66
End: 2021-10-26
Payer: MEDICARE

## 2021-10-26 DIAGNOSIS — R35.0 FREQUENCY OF MICTURITION: ICD-10-CM

## 2021-10-26 LAB
ALBUMIN UR-MCNC: NEGATIVE MG/DL
APPEARANCE UR: CLEAR
BACTERIA #/AREA URNS HPF: ABNORMAL /HPF
BILIRUB UR QL STRIP: NEGATIVE
COLOR UR AUTO: COLORLESS
GLUCOSE UR STRIP-MCNC: NEGATIVE MG/DL
HGB UR QL STRIP: NEGATIVE
KETONES UR STRIP-MCNC: NEGATIVE MG/DL
LEUKOCYTE ESTERASE UR QL STRIP: ABNORMAL
NITRATE UR QL: NEGATIVE
PH UR STRIP: 6 [PH] (ref 5–7)
RBC URINE: 2 /HPF
SP GR UR STRIP: 1.01 (ref 1–1.03)
SQUAMOUS EPITHELIAL: 1 /HPF
UROBILINOGEN UR STRIP-MCNC: <2 MG/DL
WBC URINE: 10 /HPF

## 2021-10-26 PROCEDURE — 87086 URINE CULTURE/COLONY COUNT: CPT | Mod: ORL | Performed by: NURSE PRACTITIONER

## 2021-10-26 PROCEDURE — 81001 URINALYSIS AUTO W/SCOPE: CPT | Mod: ORL | Performed by: NURSE PRACTITIONER

## 2021-10-29 LAB — BACTERIA UR CULT: ABNORMAL

## 2021-11-18 ENCOUNTER — OFFICE VISIT (OUTPATIENT)
Dept: OPHTHALMOLOGY | Facility: CLINIC | Age: 66
End: 2021-11-18
Payer: MEDICARE

## 2021-11-18 DIAGNOSIS — H40.003 GLAUCOMA SUSPECT OF BOTH EYES: ICD-10-CM

## 2021-11-18 DIAGNOSIS — Z96.1 PSEUDOPHAKIA: Primary | ICD-10-CM

## 2021-11-18 DIAGNOSIS — G35 MULTIPLE SCLEROSIS, RELAPSING-REMITTING (H): ICD-10-CM

## 2021-11-18 PROCEDURE — 99024 POSTOP FOLLOW-UP VISIT: CPT | Performed by: STUDENT IN AN ORGANIZED HEALTH CARE EDUCATION/TRAINING PROGRAM

## 2021-11-18 ASSESSMENT — TONOMETRY
OD_IOP_MMHG: 15
OS_IOP_MMHG: 13
IOP_METHOD: APPLANATION

## 2021-11-18 ASSESSMENT — EXTERNAL EXAM - RIGHT EYE: OD_EXAM: NORMAL

## 2021-11-18 ASSESSMENT — SLIT LAMP EXAM - LIDS
COMMENTS: NORMAL
COMMENTS: NORMAL

## 2021-11-18 ASSESSMENT — VISUAL ACUITY
OS_CC: 20/50
OS_CC+: +2
CORRECTION_TYPE: GLASSES
METHOD: SNELLEN - LINEAR
OD_CC: J1 BE
OD_CC: 20/30

## 2021-11-18 ASSESSMENT — REFRACTION_WEARINGRX
OS_ADD: +2.75
OS_SPHERE: -1.50
OS_CYLINDER: SPHERE
OD_SPHERE: -2.50
OS_SPHERE: -0.75
OS_CYLINDER: SPHERE
OD_ADD: +2.50
OD_CYLINDER: SPHERE
OD_CYLINDER: +1.50
OD_SPHERE: -3.25
OD_ADD: +2.75
OS_ADD: +2.50
SPECS_TYPE: BIFOCAL
OD_AXIS: 060

## 2021-11-18 ASSESSMENT — REFRACTION_MANIFEST
OS_CYLINDER: SPHERE
OD_CYLINDER: +1.50
OD_SPHERE: -3.25
OD_ADD: +2.75
OD_AXIS: 060
OS_SPHERE: -1.25
OS_ADD: +2.75

## 2021-11-18 ASSESSMENT — EXTERNAL EXAM - LEFT EYE: OS_EXAM: NORMAL

## 2021-11-18 NOTE — LETTER
"    11/18/2021         RE: Shelley Greenwood  [address] [place] [address]        Dear Colleague,    Thank you for referring your patient, Shelley Greenwood, to the [facility]. Please see a copy of my visit note below.     Current Eye Medications:  None     Subjective:  1 month S/p Yag cap right eye. Pt is not sure if vision improved from laser procedure. Overall vision seems stable. Left eye is irritated often.      Objective:  See Ophthalmology Exam.      Assessment:  Shelley Greenwood is a 66 year old female who presents with:   Encounter Diagnoses   Name Primary?     Pseudophakia - Both Eyes s/p YAG cap OU Well healed after YAG capsulotomy right eye.      Multiple sclerosis, relapsing-remitting (H)      Glaucoma suspect of both eyes        Plan:  Glasses prescription given - optional to update    Use artificial tears up to four times a day when the eyes are irritated (like Refresh Optive, Systane Balance, TheraTears, or generic artificial tears are ok. Avoid \"get the red out\" drops).     Cintia Orosco MD  [phone]               Again, thank you for allowing me to participate in the care of your patient.        Sincerely,        Cintia Orosco MD    "

## 2021-11-18 NOTE — PROGRESS NOTES
" Current Eye Medications:  None     Subjective:  1 month S/p Yag cap right eye. Pt is not sure if vision improved from laser procedure. Overall vision seems stable. Left eye is irritated often.      Objective:  See Ophthalmology Exam.      Assessment:  Shelley Greenwood is a 66 year old female who presents with:   Encounter Diagnoses   Name Primary?     Pseudophakia - Both Eyes s/p YAG cap OU Well healed after YAG capsulotomy right eye.      Multiple sclerosis, relapsing-remitting (H)      Glaucoma suspect of both eyes        Plan:  Glasses prescription given - optional to update    Use artificial tears up to four times a day when the eyes are irritated (like Refresh Optive, Systane Balance, TheraTears, or generic artificial tears are ok. Avoid \"get the red out\" drops).     Cintia Orosco MD  [phone]           "

## 2021-11-18 NOTE — PATIENT INSTRUCTIONS
"Glasses prescription given - optional to update    Use artificial tears up to four times a day when the eyes are irritated (like Refresh Optive, Systane Balance, TheraTears, or generic artificial tears are ok. Avoid \"get the red out\" drops).     Cintia Orosco MD  [phone]    "

## 2021-11-30 ENCOUNTER — LAB REQUISITION (OUTPATIENT)
Dept: LAB | Facility: CLINIC | Age: 66
End: 2021-11-30
Payer: MEDICARE

## 2021-11-30 DIAGNOSIS — R35.0 FREQUENCY OF MICTURITION: ICD-10-CM

## 2021-11-30 LAB
ALBUMIN UR-MCNC: NEGATIVE MG/DL
APPEARANCE UR: CLEAR
BACTERIA #/AREA URNS HPF: ABNORMAL /HPF
BILIRUB UR QL STRIP: NEGATIVE
COLOR UR AUTO: ABNORMAL
GLUCOSE UR STRIP-MCNC: NEGATIVE MG/DL
HGB UR QL STRIP: NEGATIVE
KETONES UR STRIP-MCNC: NEGATIVE MG/DL
LEUKOCYTE ESTERASE UR QL STRIP: ABNORMAL
MUCOUS THREADS #/AREA URNS LPF: PRESENT /LPF
NITRATE UR QL: NEGATIVE
PH UR STRIP: 6.5 [PH] (ref 5–7)
RBC URINE: 1 /HPF
SP GR UR STRIP: 1.01 (ref 1–1.03)
SQUAMOUS EPITHELIAL: <1 /HPF
TRANSITIONAL EPI: <1 /HPF
UROBILINOGEN UR STRIP-MCNC: <2 MG/DL
WBC URINE: 20 /HPF

## 2021-11-30 PROCEDURE — 81001 URINALYSIS AUTO W/SCOPE: CPT | Mod: ORL | Performed by: EMERGENCY MEDICINE

## 2021-11-30 PROCEDURE — 87086 URINE CULTURE/COLONY COUNT: CPT | Mod: ORL | Performed by: EMERGENCY MEDICINE

## 2021-12-02 LAB
BACTERIA UR CULT: ABNORMAL
BACTERIA UR CULT: ABNORMAL

## 2022-01-14 ENCOUNTER — LAB REQUISITION (OUTPATIENT)
Dept: LAB | Facility: CLINIC | Age: 67
End: 2022-01-14
Payer: COMMERCIAL

## 2022-01-14 DIAGNOSIS — R30.0 DYSURIA: ICD-10-CM

## 2022-01-14 LAB
ALBUMIN UR-MCNC: NEGATIVE MG/DL
APPEARANCE UR: ABNORMAL
BACTERIA #/AREA URNS HPF: ABNORMAL /HPF
BILIRUB UR QL STRIP: NEGATIVE
COLOR UR AUTO: ABNORMAL
GLUCOSE UR STRIP-MCNC: NEGATIVE MG/DL
HGB UR QL STRIP: NEGATIVE
HYALINE CASTS: 1 /LPF
KETONES UR STRIP-MCNC: NEGATIVE MG/DL
LEUKOCYTE ESTERASE UR QL STRIP: ABNORMAL
MUCOUS THREADS #/AREA URNS LPF: PRESENT /LPF
NITRATE UR QL: NEGATIVE
PH UR STRIP: 6 [PH] (ref 5–7)
RBC URINE: 3 /HPF
SP GR UR STRIP: 1.01 (ref 1–1.03)
SQUAMOUS EPITHELIAL: 7 /HPF
UROBILINOGEN UR STRIP-MCNC: <2 MG/DL
WBC URINE: 68 /HPF

## 2022-01-14 PROCEDURE — 81001 URINALYSIS AUTO W/SCOPE: CPT | Mod: ORL | Performed by: NURSE PRACTITIONER

## 2022-01-14 PROCEDURE — 87086 URINE CULTURE/COLONY COUNT: CPT | Mod: ORL | Performed by: NURSE PRACTITIONER

## 2022-01-16 LAB — BACTERIA UR CULT: ABNORMAL

## 2022-03-04 PROCEDURE — 81001 URINALYSIS AUTO W/SCOPE: CPT | Mod: ORL | Performed by: EMERGENCY MEDICINE

## 2022-03-04 PROCEDURE — 87086 URINE CULTURE/COLONY COUNT: CPT | Mod: ORL | Performed by: EMERGENCY MEDICINE

## 2022-03-05 ENCOUNTER — LAB REQUISITION (OUTPATIENT)
Dept: LAB | Facility: CLINIC | Age: 67
End: 2022-03-05
Payer: COMMERCIAL

## 2022-03-05 DIAGNOSIS — N39.0 URINARY TRACT INFECTION, SITE NOT SPECIFIED: ICD-10-CM

## 2022-03-05 LAB
ALBUMIN UR-MCNC: NEGATIVE MG/DL
AMORPH CRY #/AREA URNS HPF: ABNORMAL /HPF
APPEARANCE UR: ABNORMAL
BACTERIA #/AREA URNS HPF: ABNORMAL /HPF
BILIRUB UR QL STRIP: NEGATIVE
CAOX CRY #/AREA URNS HPF: ABNORMAL /HPF
COLOR UR AUTO: ABNORMAL
GLUCOSE UR STRIP-MCNC: NEGATIVE MG/DL
HGB UR QL STRIP: NEGATIVE
KETONES UR STRIP-MCNC: NEGATIVE MG/DL
LEUKOCYTE ESTERASE UR QL STRIP: ABNORMAL
NITRATE UR QL: NEGATIVE
PH UR STRIP: 6 [PH] (ref 5–7)
RBC URINE: 0 /HPF
SP GR UR STRIP: 1.01 (ref 1–1.03)
SQUAMOUS EPITHELIAL: 2 /HPF
UROBILINOGEN UR STRIP-MCNC: <2 MG/DL
WBC URINE: 53 /HPF

## 2022-03-06 LAB — BACTERIA UR CULT: NORMAL

## 2022-04-05 ENCOUNTER — LAB REQUISITION (OUTPATIENT)
Dept: LAB | Facility: CLINIC | Age: 67
End: 2022-04-05
Payer: COMMERCIAL

## 2022-04-05 DIAGNOSIS — E11.9 TYPE 2 DIABETES MELLITUS WITHOUT COMPLICATIONS (H): ICD-10-CM

## 2022-04-05 DIAGNOSIS — N18.9 CHRONIC KIDNEY DISEASE, UNSPECIFIED: ICD-10-CM

## 2022-04-05 LAB
ANION GAP SERPL CALCULATED.3IONS-SCNC: 10 MMOL/L (ref 5–18)
AST SERPL W P-5'-P-CCNC: 19 U/L (ref 0–40)
BASOPHILS # BLD AUTO: 0 10E3/UL (ref 0–0.2)
BASOPHILS NFR BLD AUTO: 1 %
BUN SERPL-MCNC: 13 MG/DL (ref 8–22)
CALCIUM SERPL-MCNC: 10.3 MG/DL (ref 8.5–10.5)
CHLORIDE BLD-SCNC: 104 MMOL/L (ref 98–107)
CHOLEST SERPL-MCNC: 210 MG/DL
CO2 SERPL-SCNC: 22 MMOL/L (ref 22–31)
CREAT SERPL-MCNC: 0.66 MG/DL (ref 0.6–1.1)
EOSINOPHIL # BLD AUTO: 0.1 10E3/UL (ref 0–0.7)
EOSINOPHIL NFR BLD AUTO: 2 %
ERYTHROCYTE [DISTWIDTH] IN BLOOD BY AUTOMATED COUNT: 13.8 % (ref 10–15)
GFR SERPL CREATININE-BSD FRML MDRD: >90 ML/MIN/1.73M2
GLUCOSE BLD-MCNC: 102 MG/DL (ref 70–125)
HBA1C MFR BLD: 5.5 %
HCT VFR BLD AUTO: 35.5 % (ref 35–47)
HDLC SERPL-MCNC: 28 MG/DL
HGB BLD-MCNC: 11.1 G/DL (ref 11.7–15.7)
IMM GRANULOCYTES # BLD: 0.1 10E3/UL
IMM GRANULOCYTES NFR BLD: 1 %
LDLC SERPL CALC-MCNC: 124 MG/DL
LYMPHOCYTES # BLD AUTO: 2 10E3/UL (ref 0.8–5.3)
LYMPHOCYTES NFR BLD AUTO: 27 %
MCH RBC QN AUTO: 28.1 PG (ref 26.5–33)
MCHC RBC AUTO-ENTMCNC: 31.3 G/DL (ref 31.5–36.5)
MCV RBC AUTO: 90 FL (ref 78–100)
MONOCYTES # BLD AUTO: 0.6 10E3/UL (ref 0–1.3)
MONOCYTES NFR BLD AUTO: 8 %
NEUTROPHILS # BLD AUTO: 4.6 10E3/UL (ref 1.6–8.3)
NEUTROPHILS NFR BLD AUTO: 61 %
NRBC # BLD AUTO: 0 10E3/UL
NRBC BLD AUTO-RTO: 0 /100
PLATELET # BLD AUTO: 293 10E3/UL (ref 150–450)
POTASSIUM BLD-SCNC: 4.4 MMOL/L (ref 3.5–5)
RBC # BLD AUTO: 3.95 10E6/UL (ref 3.8–5.2)
SODIUM SERPL-SCNC: 136 MMOL/L (ref 136–145)
TRIGL SERPL-MCNC: 288 MG/DL
WBC # BLD AUTO: 7.4 10E3/UL (ref 4–11)

## 2022-04-05 PROCEDURE — 80061 LIPID PANEL: CPT | Mod: ORL | Performed by: EMERGENCY MEDICINE

## 2022-04-05 PROCEDURE — P9604 ONE-WAY ALLOW PRORATED TRIP: HCPCS | Mod: ORL | Performed by: EMERGENCY MEDICINE

## 2022-04-05 PROCEDURE — 80048 BASIC METABOLIC PNL TOTAL CA: CPT | Mod: ORL | Performed by: EMERGENCY MEDICINE

## 2022-04-05 PROCEDURE — 36415 COLL VENOUS BLD VENIPUNCTURE: CPT | Mod: ORL | Performed by: EMERGENCY MEDICINE

## 2022-04-05 PROCEDURE — 85025 COMPLETE CBC W/AUTO DIFF WBC: CPT | Mod: ORL | Performed by: EMERGENCY MEDICINE

## 2022-04-05 PROCEDURE — 83036 HEMOGLOBIN GLYCOSYLATED A1C: CPT | Mod: ORL | Performed by: EMERGENCY MEDICINE

## 2022-04-05 PROCEDURE — 84450 TRANSFERASE (AST) (SGOT): CPT | Mod: ORL | Performed by: EMERGENCY MEDICINE

## 2022-06-14 ENCOUNTER — LAB REQUISITION (OUTPATIENT)
Dept: LAB | Facility: CLINIC | Age: 67
End: 2022-06-14
Payer: COMMERCIAL

## 2022-06-14 LAB
ALBUMIN UR-MCNC: NEGATIVE MG/DL
APPEARANCE UR: CLEAR
BACTERIA #/AREA URNS HPF: ABNORMAL /HPF
BILIRUB UR QL STRIP: NEGATIVE
COLOR UR AUTO: ABNORMAL
GLUCOSE UR STRIP-MCNC: NEGATIVE MG/DL
HGB UR QL STRIP: NEGATIVE
KETONES UR STRIP-MCNC: NEGATIVE MG/DL
LEUKOCYTE ESTERASE UR QL STRIP: ABNORMAL
MUCOUS THREADS #/AREA URNS LPF: PRESENT /LPF
NITRATE UR QL: NEGATIVE
PH UR STRIP: 6.5 [PH] (ref 5–7)
RBC URINE: <1 /HPF
SP GR UR STRIP: 1.01 (ref 1–1.03)
SQUAMOUS EPITHELIAL: 2 /HPF
UROBILINOGEN UR STRIP-MCNC: <2 MG/DL
WBC URINE: 9 /HPF

## 2022-06-14 PROCEDURE — 87086 URINE CULTURE/COLONY COUNT: CPT | Mod: ORL | Performed by: NURSE PRACTITIONER

## 2022-06-14 PROCEDURE — 81001 URINALYSIS AUTO W/SCOPE: CPT | Mod: ORL | Performed by: NURSE PRACTITIONER

## 2022-06-16 LAB — BACTERIA UR CULT: NORMAL

## 2022-10-07 ENCOUNTER — LAB REQUISITION (OUTPATIENT)
Dept: LAB | Facility: CLINIC | Age: 67
End: 2022-10-07
Payer: COMMERCIAL

## 2022-10-07 DIAGNOSIS — R35.0 FREQUENCY OF MICTURITION: ICD-10-CM

## 2022-10-07 DIAGNOSIS — N39.0 URINARY TRACT INFECTION, SITE NOT SPECIFIED: ICD-10-CM

## 2022-10-07 DIAGNOSIS — R30.0 DYSURIA: ICD-10-CM

## 2022-10-08 ENCOUNTER — LAB REQUISITION (OUTPATIENT)
Dept: LAB | Facility: CLINIC | Age: 67
End: 2022-10-08
Payer: COMMERCIAL

## 2022-10-08 DIAGNOSIS — R35.0 FREQUENCY OF MICTURITION: ICD-10-CM

## 2022-10-08 LAB
ALBUMIN UR-MCNC: NEGATIVE MG/DL
APPEARANCE UR: CLEAR
BACTERIA #/AREA URNS HPF: ABNORMAL /HPF
BILIRUB UR QL STRIP: NEGATIVE
COLOR UR AUTO: ABNORMAL
GLUCOSE UR STRIP-MCNC: NEGATIVE MG/DL
HGB UR QL STRIP: NEGATIVE
KETONES UR STRIP-MCNC: NEGATIVE MG/DL
LEUKOCYTE ESTERASE UR QL STRIP: ABNORMAL
MUCOUS THREADS #/AREA URNS LPF: PRESENT /LPF
NITRATE UR QL: NEGATIVE
PH UR STRIP: 5.5 [PH] (ref 5–7)
RBC URINE: 1 /HPF
SP GR UR STRIP: 1.01 (ref 1–1.03)
SQUAMOUS EPITHELIAL: <1 /HPF
UROBILINOGEN UR STRIP-MCNC: NORMAL MG/DL
WBC URINE: 15 /HPF

## 2022-10-08 PROCEDURE — 87086 URINE CULTURE/COLONY COUNT: CPT | Mod: ORL | Performed by: NURSE PRACTITIONER

## 2022-10-08 PROCEDURE — 81001 URINALYSIS AUTO W/SCOPE: CPT | Mod: ORL | Performed by: NURSE PRACTITIONER

## 2022-10-09 LAB — BACTERIA UR CULT: ABNORMAL

## 2022-11-29 ENCOUNTER — LAB REQUISITION (OUTPATIENT)
Dept: LAB | Facility: CLINIC | Age: 67
End: 2022-11-29
Payer: COMMERCIAL

## 2022-11-29 DIAGNOSIS — R30.0 DYSURIA: ICD-10-CM

## 2022-12-01 ENCOUNTER — LAB REQUISITION (OUTPATIENT)
Dept: LAB | Facility: CLINIC | Age: 67
End: 2022-12-01
Payer: COMMERCIAL

## 2022-12-01 DIAGNOSIS — R30.0 DYSURIA: ICD-10-CM

## 2022-12-01 LAB
ALBUMIN UR-MCNC: NEGATIVE MG/DL
APPEARANCE UR: CLEAR
BILIRUB UR QL STRIP: NEGATIVE
COLOR UR AUTO: ABNORMAL
GLUCOSE UR STRIP-MCNC: NEGATIVE MG/DL
HGB UR QL STRIP: NEGATIVE
KETONES UR STRIP-MCNC: NEGATIVE MG/DL
LEUKOCYTE ESTERASE UR QL STRIP: ABNORMAL
MUCOUS THREADS #/AREA URNS LPF: PRESENT /LPF
NITRATE UR QL: NEGATIVE
PH UR STRIP: 6 [PH] (ref 5–7)
RBC URINE: 0 /HPF
SP GR UR STRIP: 1 (ref 1–1.03)
SQUAMOUS EPITHELIAL: <1 /HPF
UROBILINOGEN UR STRIP-MCNC: NORMAL MG/DL
WBC URINE: 1 /HPF

## 2022-12-01 PROCEDURE — 87086 URINE CULTURE/COLONY COUNT: CPT | Mod: ORL | Performed by: NURSE PRACTITIONER

## 2022-12-01 PROCEDURE — 81001 URINALYSIS AUTO W/SCOPE: CPT | Mod: ORL | Performed by: NURSE PRACTITIONER

## 2022-12-02 LAB — BACTERIA UR CULT: NORMAL

## 2022-12-20 ENCOUNTER — LAB REQUISITION (OUTPATIENT)
Dept: LAB | Facility: CLINIC | Age: 67
End: 2022-12-20
Payer: COMMERCIAL

## 2022-12-20 DIAGNOSIS — F31.31 BIPOLAR DISORDER, CURRENT EPISODE DEPRESSED, MILD (H): ICD-10-CM

## 2022-12-20 DIAGNOSIS — E03.9 HYPOTHYROIDISM, UNSPECIFIED: ICD-10-CM

## 2022-12-21 LAB
CHOLEST SERPL-MCNC: 237 MG/DL
ERYTHROCYTE [DISTWIDTH] IN BLOOD BY AUTOMATED COUNT: 14 % (ref 10–15)
HCT VFR BLD AUTO: 39.4 % (ref 35–47)
HDLC SERPL-MCNC: 35 MG/DL
HGB BLD-MCNC: 12 G/DL (ref 11.7–15.7)
LDLC SERPL CALC-MCNC: 149 MG/DL
MCH RBC QN AUTO: 27.6 PG (ref 26.5–33)
MCHC RBC AUTO-ENTMCNC: 30.5 G/DL (ref 31.5–36.5)
MCV RBC AUTO: 91 FL (ref 78–100)
NONHDLC SERPL-MCNC: 202 MG/DL
PLATELET # BLD AUTO: 283 10E3/UL (ref 150–450)
RBC # BLD AUTO: 4.35 10E6/UL (ref 3.8–5.2)
TRIGL SERPL-MCNC: 264 MG/DL
TSH SERPL DL<=0.005 MIU/L-ACNC: 0.95 UIU/ML (ref 0.3–4.2)
VIT B12 SERPL-MCNC: 405 PG/ML (ref 232–1245)
WBC # BLD AUTO: 7.2 10E3/UL (ref 4–11)

## 2022-12-21 PROCEDURE — 82607 VITAMIN B-12: CPT | Mod: ORL | Performed by: NURSE PRACTITIONER

## 2022-12-21 PROCEDURE — 80061 LIPID PANEL: CPT | Mod: ORL | Performed by: NURSE PRACTITIONER

## 2022-12-21 PROCEDURE — 80053 COMPREHEN METABOLIC PANEL: CPT | Mod: ORL | Performed by: NURSE PRACTITIONER

## 2022-12-21 PROCEDURE — P9603 ONE-WAY ALLOW PRORATED MILES: HCPCS | Mod: ORL | Performed by: NURSE PRACTITIONER

## 2022-12-21 PROCEDURE — 82306 VITAMIN D 25 HYDROXY: CPT | Mod: ORL | Performed by: NURSE PRACTITIONER

## 2022-12-21 PROCEDURE — 36415 COLL VENOUS BLD VENIPUNCTURE: CPT | Mod: ORL | Performed by: NURSE PRACTITIONER

## 2022-12-21 PROCEDURE — 85027 COMPLETE CBC AUTOMATED: CPT | Mod: ORL | Performed by: NURSE PRACTITIONER

## 2022-12-21 PROCEDURE — 84443 ASSAY THYROID STIM HORMONE: CPT | Mod: ORL | Performed by: NURSE PRACTITIONER

## 2022-12-22 LAB
ALBUMIN SERPL BCG-MCNC: 4.1 G/DL (ref 3.5–5.2)
ALP SERPL-CCNC: 109 U/L (ref 35–104)
ALT SERPL W P-5'-P-CCNC: 24 U/L (ref 10–35)
ANION GAP SERPL CALCULATED.3IONS-SCNC: 19 MMOL/L (ref 7–15)
AST SERPL W P-5'-P-CCNC: 28 U/L (ref 10–35)
BILIRUB SERPL-MCNC: 0.2 MG/DL
BUN SERPL-MCNC: 7.2 MG/DL (ref 8–23)
CALCIUM SERPL-MCNC: 10.9 MG/DL (ref 8.8–10.2)
CHLORIDE SERPL-SCNC: 102 MMOL/L (ref 98–107)
CREAT SERPL-MCNC: 0.71 MG/DL (ref 0.51–0.95)
DEPRECATED CALCIDIOL+CALCIFEROL SERPL-MC: 29 UG/L (ref 20–75)
DEPRECATED HCO3 PLAS-SCNC: 17 MMOL/L (ref 22–29)
GFR SERPL CREATININE-BSD FRML MDRD: >90 ML/MIN/1.73M2
GLUCOSE SERPL-MCNC: 109 MG/DL (ref 70–99)
POTASSIUM SERPL-SCNC: 4.7 MMOL/L (ref 3.4–5.3)
PROT SERPL-MCNC: 7.4 G/DL (ref 6.4–8.3)
SODIUM SERPL-SCNC: 138 MMOL/L (ref 136–145)

## 2023-01-01 ENCOUNTER — LAB REQUISITION (OUTPATIENT)
Dept: LAB | Facility: CLINIC | Age: 68
End: 2023-01-01
Payer: COMMERCIAL

## 2023-01-01 DIAGNOSIS — E11.9 TYPE 2 DIABETES MELLITUS WITHOUT COMPLICATIONS (H): ICD-10-CM

## 2023-01-01 DIAGNOSIS — D64.9 ANEMIA, UNSPECIFIED: ICD-10-CM

## 2023-01-01 DIAGNOSIS — N39.0 URINARY TRACT INFECTION, SITE NOT SPECIFIED: ICD-10-CM

## 2023-01-01 DIAGNOSIS — R30.0 DYSURIA: ICD-10-CM

## 2023-01-01 DIAGNOSIS — E87.6 HYPOKALEMIA: ICD-10-CM

## 2023-01-01 DIAGNOSIS — N18.30 CHRONIC KIDNEY DISEASE, STAGE 3 UNSPECIFIED (H): ICD-10-CM

## 2023-01-01 DIAGNOSIS — I10 ESSENTIAL (PRIMARY) HYPERTENSION: ICD-10-CM

## 2023-01-01 LAB
ALBUMIN UR-MCNC: 20 MG/DL
ALBUMIN UR-MCNC: NEGATIVE MG/DL
ANION GAP SERPL CALCULATED.3IONS-SCNC: 11 MMOL/L (ref 7–15)
APPEARANCE UR: ABNORMAL
APPEARANCE UR: ABNORMAL
APPEARANCE UR: CLEAR
BACTERIA #/AREA URNS HPF: ABNORMAL /HPF
BACTERIA UR CULT: ABNORMAL
BACTERIA UR CULT: NORMAL
BASOPHILS # BLD AUTO: 0.1 10E3/UL (ref 0–0.2)
BASOPHILS NFR BLD AUTO: 1 %
BILIRUB UR QL STRIP: NEGATIVE
BUN SERPL-MCNC: 8.5 MG/DL (ref 8–23)
CALCIUM SERPL-MCNC: 10.2 MG/DL (ref 8.8–10.2)
CHLORIDE SERPL-SCNC: 100 MMOL/L (ref 98–107)
COLOR UR AUTO: ABNORMAL
COLOR UR AUTO: YELLOW
CREAT SERPL-MCNC: 0.71 MG/DL (ref 0.51–0.95)
DEPRECATED HCO3 PLAS-SCNC: 23 MMOL/L (ref 22–29)
EOSINOPHIL # BLD AUTO: 0.1 10E3/UL (ref 0–0.7)
EOSINOPHIL NFR BLD AUTO: 1 %
ERYTHROCYTE [DISTWIDTH] IN BLOOD BY AUTOMATED COUNT: 13.9 % (ref 10–15)
FOLATE SERPL-MCNC: 5 NG/ML (ref 4.6–34.8)
GFR SERPL CREATININE-BSD FRML MDRD: >90 ML/MIN/1.73M2
GLUCOSE SERPL-MCNC: 104 MG/DL (ref 70–99)
GLUCOSE UR STRIP-MCNC: NEGATIVE MG/DL
HBA1C MFR BLD: 5.5 %
HCT VFR BLD AUTO: 35.9 % (ref 35–47)
HGB BLD-MCNC: 10.8 G/DL (ref 11.7–15.7)
HGB UR QL STRIP: ABNORMAL
HGB UR QL STRIP: NEGATIVE
IMM GRANULOCYTES # BLD: 0 10E3/UL
IMM GRANULOCYTES NFR BLD: 1 %
KETONES UR STRIP-MCNC: NEGATIVE MG/DL
LEUKOCYTE ESTERASE UR QL STRIP: ABNORMAL
LYMPHOCYTES # BLD AUTO: 1.7 10E3/UL (ref 0.8–5.3)
LYMPHOCYTES NFR BLD AUTO: 26 %
MCH RBC QN AUTO: 27.8 PG (ref 26.5–33)
MCHC RBC AUTO-ENTMCNC: 30.1 G/DL (ref 31.5–36.5)
MCV RBC AUTO: 92 FL (ref 78–100)
METHYLMALONATE SERPL-SCNC: 0.21 UMOL/L (ref 0–0.4)
MONOCYTES # BLD AUTO: 0.5 10E3/UL (ref 0–1.3)
MONOCYTES NFR BLD AUTO: 8 %
MUCOUS THREADS #/AREA URNS LPF: PRESENT /LPF
NEUTROPHILS # BLD AUTO: 4 10E3/UL (ref 1.6–8.3)
NEUTROPHILS NFR BLD AUTO: 63 %
NITRATE UR QL: NEGATIVE
NRBC # BLD AUTO: 0 10E3/UL
NRBC BLD AUTO-RTO: 0 /100
PH UR STRIP: 6 [PH] (ref 5–7)
PH UR STRIP: 6.5 [PH] (ref 5–7)
PLATELET # BLD AUTO: 252 10E3/UL (ref 150–450)
POTASSIUM SERPL-SCNC: 4.5 MMOL/L (ref 3.4–5.3)
RBC # BLD AUTO: 3.89 10E6/UL (ref 3.8–5.2)
RBC URINE: 0 /HPF
RBC URINE: 1 /HPF
RBC URINE: 2 /HPF
RBC URINE: <1 /HPF
RBC URINE: <1 /HPF
SODIUM SERPL-SCNC: 134 MMOL/L (ref 136–145)
SP GR UR STRIP: 1 (ref 1–1.03)
SP GR UR STRIP: 1.01 (ref 1–1.03)
SP GR UR STRIP: 1.02 (ref 1–1.03)
SQUAMOUS EPITHELIAL: 1 /HPF
SQUAMOUS EPITHELIAL: 2 /HPF
SQUAMOUS EPITHELIAL: <1 /HPF
TRANSITIONAL EPI: <1 /HPF
TSH SERPL DL<=0.005 MIU/L-ACNC: 0.75 UIU/ML (ref 0.3–4.2)
UROBILINOGEN UR STRIP-MCNC: NORMAL MG/DL
VIT B12 SERPL-MCNC: 421 PG/ML (ref 232–1245)
WBC # BLD AUTO: 6.3 10E3/UL (ref 4–11)
WBC URINE: 11 /HPF
WBC URINE: 16 /HPF
WBC URINE: 26 /HPF
WBC URINE: 3 /HPF
WBC URINE: 35 /HPF

## 2023-01-01 PROCEDURE — 81001 URINALYSIS AUTO W/SCOPE: CPT | Mod: ORL | Performed by: NURSE PRACTITIONER

## 2023-01-01 PROCEDURE — 36415 COLL VENOUS BLD VENIPUNCTURE: CPT | Mod: ORL | Performed by: NURSE PRACTITIONER

## 2023-01-01 PROCEDURE — 83921 ORGANIC ACID SINGLE QUANT: CPT | Mod: ORL | Performed by: EMERGENCY MEDICINE

## 2023-01-01 PROCEDURE — 87186 SC STD MICRODIL/AGAR DIL: CPT | Mod: ORL | Performed by: NURSE PRACTITIONER

## 2023-01-01 PROCEDURE — 82746 ASSAY OF FOLIC ACID SERUM: CPT | Mod: ORL | Performed by: EMERGENCY MEDICINE

## 2023-01-01 PROCEDURE — 80048 BASIC METABOLIC PNL TOTAL CA: CPT | Mod: ORL | Performed by: NURSE PRACTITIONER

## 2023-01-01 PROCEDURE — 36415 COLL VENOUS BLD VENIPUNCTURE: CPT | Mod: ORL | Performed by: EMERGENCY MEDICINE

## 2023-01-01 PROCEDURE — 84443 ASSAY THYROID STIM HORMONE: CPT | Mod: ORL | Performed by: EMERGENCY MEDICINE

## 2023-01-01 PROCEDURE — P9604 ONE-WAY ALLOW PRORATED TRIP: HCPCS | Mod: ORL | Performed by: EMERGENCY MEDICINE

## 2023-01-01 PROCEDURE — 85025 COMPLETE CBC W/AUTO DIFF WBC: CPT | Mod: ORL | Performed by: NURSE PRACTITIONER

## 2023-01-01 PROCEDURE — P9603 ONE-WAY ALLOW PRORATED MILES: HCPCS | Mod: ORL | Performed by: NURSE PRACTITIONER

## 2023-01-01 PROCEDURE — 82607 VITAMIN B-12: CPT | Mod: ORL | Performed by: EMERGENCY MEDICINE

## 2023-01-01 PROCEDURE — 87086 URINE CULTURE/COLONY COUNT: CPT | Mod: ORL | Performed by: NURSE PRACTITIONER

## 2023-01-01 PROCEDURE — 83036 HEMOGLOBIN GLYCOSYLATED A1C: CPT | Mod: ORL | Performed by: NURSE PRACTITIONER

## 2023-01-01 PROCEDURE — P9604 ONE-WAY ALLOW PRORATED TRIP: HCPCS | Mod: ORL | Performed by: NURSE PRACTITIONER

## 2023-01-16 ENCOUNTER — LAB REQUISITION (OUTPATIENT)
Dept: LAB | Facility: CLINIC | Age: 68
End: 2023-01-16
Payer: COMMERCIAL

## 2023-01-16 DIAGNOSIS — E78.49 OTHER HYPERLIPIDEMIA: ICD-10-CM

## 2023-01-17 ENCOUNTER — LAB REQUISITION (OUTPATIENT)
Dept: LAB | Facility: CLINIC | Age: 68
End: 2023-01-17
Payer: COMMERCIAL

## 2023-01-17 DIAGNOSIS — E78.5 HYPERLIPIDEMIA, UNSPECIFIED: ICD-10-CM

## 2023-01-18 LAB
CHOLEST SERPL-MCNC: 169 MG/DL
HDLC SERPL-MCNC: 38 MG/DL
LDLC SERPL CALC-MCNC: 100 MG/DL
NONHDLC SERPL-MCNC: 131 MG/DL
TRIGL SERPL-MCNC: 157 MG/DL

## 2023-01-18 PROCEDURE — 36415 COLL VENOUS BLD VENIPUNCTURE: CPT | Mod: ORL | Performed by: NURSE PRACTITIONER

## 2023-01-18 PROCEDURE — P9604 ONE-WAY ALLOW PRORATED TRIP: HCPCS | Mod: ORL | Performed by: NURSE PRACTITIONER

## 2023-01-18 PROCEDURE — 80061 LIPID PANEL: CPT | Mod: ORL | Performed by: NURSE PRACTITIONER

## (undated) DEVICE — GLOVE PROTEXIS W/NEU-THERA 7.0  2D73TE70

## (undated) DEVICE — EYE TIP IRRIGATION & ASPIRATION POLYMER 35D BENT 8065751511

## (undated) DEVICE — GLOVE PROTEXIS MICRO 6.0  2D73PM60

## (undated) DEVICE — GLOVE PROTEXIS MICRO 7.0  2D73PM70

## (undated) DEVICE — EYE SOL BSS 15ML BOTTLE 65079515

## (undated) DEVICE — LINEN TOWEL PACK X5 5464

## (undated) DEVICE — EYE KNIFE SLIT XSTAR VISITEC 2.4MM 45DEG BEVEL UP 373724

## (undated) DEVICE — EYE SOL BSS 500ML

## (undated) DEVICE — EYE PACK BVI READYPAK KIT #1

## (undated) DEVICE — PACK CATARACT CUSTOM SO DALE SEY32CTFCX

## (undated) DEVICE — EYE CANN IRR 25GA HYDRODISSECTING 585037

## (undated) DEVICE — EYE PACK CUSTOM ANTERIOR 30DEG TIP CENTURION PPK6682-04

## (undated) DEVICE — EYE SHIELD PLASTIC

## (undated) DEVICE — Device

## (undated) RX ORDER — LIDOCAINE HYDROCHLORIDE 20 MG/ML
INJECTION, SOLUTION EPIDURAL; INFILTRATION; INTRACAUDAL; PERINEURAL
Status: DISPENSED
Start: 2019-04-01

## (undated) RX ORDER — LIDOCAINE HYDROCHLORIDE 10 MG/ML
INJECTION, SOLUTION EPIDURAL; INFILTRATION; INTRACAUDAL; PERINEURAL
Status: DISPENSED
Start: 2018-10-22

## (undated) RX ORDER — CYCLOPENTOLATE HYDROCHLORIDE 10 MG/ML
SOLUTION/ DROPS OPHTHALMIC
Status: DISPENSED
Start: 2019-04-01

## (undated) RX ORDER — PROPOFOL 10 MG/ML
INJECTION, EMULSION INTRAVENOUS
Status: DISPENSED
Start: 2018-10-22

## (undated) RX ORDER — EPINEPHRINE 1 MG/ML
INJECTION, SOLUTION, CONCENTRATE INTRAVENOUS
Status: DISPENSED
Start: 2018-10-22

## (undated) RX ORDER — ONDANSETRON 2 MG/ML
INJECTION INTRAMUSCULAR; INTRAVENOUS
Status: DISPENSED
Start: 2018-10-22

## (undated) RX ORDER — TETRACAINE HYDROCHLORIDE 5 MG/ML
SOLUTION OPHTHALMIC
Status: DISPENSED
Start: 2018-10-22

## (undated) RX ORDER — TROPICAMIDE 10 MG/ML
SOLUTION/ DROPS OPHTHALMIC
Status: DISPENSED
Start: 2019-04-01

## (undated) RX ORDER — ONDANSETRON 2 MG/ML
INJECTION INTRAMUSCULAR; INTRAVENOUS
Status: DISPENSED
Start: 2019-04-01

## (undated) RX ORDER — OFLOXACIN 3 MG/ML
SOLUTION/ DROPS OPHTHALMIC
Status: DISPENSED
Start: 2018-10-22

## (undated) RX ORDER — FENTANYL CITRATE 50 UG/ML
INJECTION, SOLUTION INTRAMUSCULAR; INTRAVENOUS
Status: DISPENSED
Start: 2019-04-01

## (undated) RX ORDER — PROPOFOL 10 MG/ML
INJECTION, EMULSION INTRAVENOUS
Status: DISPENSED
Start: 2019-04-01

## (undated) RX ORDER — LIDOCAINE HYDROCHLORIDE 20 MG/ML
INJECTION, SOLUTION EPIDURAL; INFILTRATION; INTRACAUDAL; PERINEURAL
Status: DISPENSED
Start: 2018-10-22

## (undated) RX ORDER — FENTANYL CITRATE 50 UG/ML
INJECTION, SOLUTION INTRAMUSCULAR; INTRAVENOUS
Status: DISPENSED
Start: 2018-10-22

## (undated) RX ORDER — PHENYLEPHRINE HYDROCHLORIDE 25 MG/ML
SOLUTION/ DROPS OPHTHALMIC
Status: DISPENSED
Start: 2019-04-01